# Patient Record
Sex: FEMALE | Race: WHITE | NOT HISPANIC OR LATINO | Employment: OTHER | URBAN - METROPOLITAN AREA
[De-identification: names, ages, dates, MRNs, and addresses within clinical notes are randomized per-mention and may not be internally consistent; named-entity substitution may affect disease eponyms.]

---

## 2017-01-23 ENCOUNTER — COMMUNICATION - HEALTHEAST (OUTPATIENT)
Dept: ONCOLOGY | Facility: HOSPITAL | Age: 72
End: 2017-01-23

## 2017-02-03 ENCOUNTER — COMMUNICATION - HEALTHEAST (OUTPATIENT)
Dept: ONCOLOGY | Facility: HOSPITAL | Age: 72
End: 2017-02-03

## 2017-02-03 ENCOUNTER — OFFICE VISIT - HEALTHEAST (OUTPATIENT)
Dept: RADIATION ONCOLOGY | Facility: HOSPITAL | Age: 72
End: 2017-02-03

## 2017-02-03 DIAGNOSIS — C50.111 MALIGNANT NEOPLASM OF CENTRAL PORTION OF RIGHT FEMALE BREAST (H): ICD-10-CM

## 2017-03-07 ENCOUNTER — OFFICE VISIT - HEALTHEAST (OUTPATIENT)
Dept: ONCOLOGY | Facility: HOSPITAL | Age: 72
End: 2017-03-07

## 2017-03-07 DIAGNOSIS — C50.919 BREAST CANCER (H): ICD-10-CM

## 2017-05-24 ENCOUNTER — COMMUNICATION - HEALTHEAST (OUTPATIENT)
Dept: ONCOLOGY | Facility: HOSPITAL | Age: 72
End: 2017-05-24

## 2017-06-06 ENCOUNTER — COMMUNICATION - HEALTHEAST (OUTPATIENT)
Dept: ADMINISTRATIVE | Facility: HOSPITAL | Age: 72
End: 2017-06-06

## 2017-06-08 ENCOUNTER — OFFICE VISIT - HEALTHEAST (OUTPATIENT)
Dept: ONCOLOGY | Facility: HOSPITAL | Age: 72
End: 2017-06-08

## 2017-06-08 DIAGNOSIS — C50.919 BREAST CANCER (H): ICD-10-CM

## 2017-06-22 ENCOUNTER — COMMUNICATION - HEALTHEAST (OUTPATIENT)
Dept: ONCOLOGY | Facility: CLINIC | Age: 72
End: 2017-06-22

## 2017-08-28 ENCOUNTER — COMMUNICATION - HEALTHEAST (OUTPATIENT)
Dept: ONCOLOGY | Facility: HOSPITAL | Age: 72
End: 2017-08-28

## 2017-09-11 ENCOUNTER — COMMUNICATION - HEALTHEAST (OUTPATIENT)
Dept: ADMINISTRATIVE | Facility: HOSPITAL | Age: 72
End: 2017-09-11

## 2017-09-12 ENCOUNTER — OFFICE VISIT - HEALTHEAST (OUTPATIENT)
Dept: ONCOLOGY | Facility: HOSPITAL | Age: 72
End: 2017-09-12

## 2017-09-12 DIAGNOSIS — C50.919 BREAST CANCER (H): ICD-10-CM

## 2017-10-10 ENCOUNTER — HOSPITAL ENCOUNTER (OUTPATIENT)
Dept: MAMMOGRAPHY | Facility: HOSPITAL | Age: 72
Discharge: HOME | End: 2017-10-10
Attending: SPECIALIST

## 2017-10-10 DIAGNOSIS — C50.411 MALIGNANT NEOPLASM OF UPPER-OUTER QUADRANT OF RIGHT FEMALE BREAST (H): ICD-10-CM

## 2017-11-07 ENCOUNTER — OFFICE VISIT - HEALTHEAST (OUTPATIENT)
Dept: SURGERY | Facility: CLINIC | Age: 72
End: 2017-11-07

## 2017-11-07 DIAGNOSIS — Z85.3 PERSONAL HISTORY OF BREAST CANCER: ICD-10-CM

## 2017-11-07 ASSESSMENT — MIFFLIN-ST. JEOR: SCORE: 1219.81

## 2017-12-20 ENCOUNTER — COMMUNICATION - HEALTHEAST (OUTPATIENT)
Dept: ONCOLOGY | Facility: HOSPITAL | Age: 72
End: 2017-12-20

## 2018-04-11 ENCOUNTER — COMMUNICATION - HEALTHEAST (OUTPATIENT)
Dept: ADMINISTRATIVE | Facility: HOSPITAL | Age: 73
End: 2018-04-11

## 2018-04-13 ENCOUNTER — OFFICE VISIT - HEALTHEAST (OUTPATIENT)
Dept: ONCOLOGY | Facility: HOSPITAL | Age: 73
End: 2018-04-13

## 2018-04-13 DIAGNOSIS — Z12.31 ENCOUNTER FOR SCREENING MAMMOGRAM FOR MALIGNANT NEOPLASM OF BREAST: ICD-10-CM

## 2018-04-13 DIAGNOSIS — C50.919 BREAST CANCER (H): ICD-10-CM

## 2018-10-02 ENCOUNTER — RECORDS - HEALTHEAST (OUTPATIENT)
Dept: ADMINISTRATIVE | Facility: OTHER | Age: 73
End: 2018-10-02

## 2018-10-02 ENCOUNTER — RECORDS - HEALTHEAST (OUTPATIENT)
Dept: LAB | Facility: CLINIC | Age: 73
End: 2018-10-02

## 2018-10-02 LAB
ALBUMIN SERPL-MCNC: 3.2 G/DL (ref 3.5–5)
ALP SERPL-CCNC: 72 U/L (ref 45–120)
ALT SERPL W P-5'-P-CCNC: 16 U/L (ref 0–45)
ANION GAP SERPL CALCULATED.3IONS-SCNC: 11 MMOL/L (ref 5–18)
AST SERPL W P-5'-P-CCNC: 17 U/L (ref 0–40)
BILIRUB SERPL-MCNC: 1.7 MG/DL (ref 0–1)
BUN SERPL-MCNC: 28 MG/DL (ref 8–28)
CALCIUM SERPL-MCNC: 9.4 MG/DL (ref 8.5–10.5)
CHLORIDE BLD-SCNC: 98 MMOL/L (ref 98–107)
CHOLEST SERPL-MCNC: 196 MG/DL
CO2 SERPL-SCNC: 26 MMOL/L (ref 22–31)
CREAT SERPL-MCNC: 0.87 MG/DL (ref 0.6–1.1)
FASTING STATUS PATIENT QL REPORTED: NORMAL
GFR SERPL CREATININE-BSD FRML MDRD: >60 ML/MIN/1.73M2
GLUCOSE BLD-MCNC: 120 MG/DL (ref 70–125)
HDLC SERPL-MCNC: 72 MG/DL
LDLC SERPL CALC-MCNC: 101 MG/DL
POTASSIUM BLD-SCNC: 3.8 MMOL/L (ref 3.5–5)
PROT SERPL-MCNC: 6.1 G/DL (ref 6–8)
SODIUM SERPL-SCNC: 135 MMOL/L (ref 136–145)
TRIGL SERPL-MCNC: 113 MG/DL
TSH SERPL DL<=0.005 MIU/L-ACNC: 0.88 UIU/ML (ref 0.3–5)

## 2018-10-04 LAB — BACTERIA SPEC CULT: NORMAL

## 2018-10-11 ENCOUNTER — COMMUNICATION - HEALTHEAST (OUTPATIENT)
Dept: ONCOLOGY | Facility: HOSPITAL | Age: 73
End: 2018-10-11

## 2018-10-11 ENCOUNTER — COMMUNICATION - HEALTHEAST (OUTPATIENT)
Dept: ADMINISTRATIVE | Facility: HOSPITAL | Age: 73
End: 2018-10-11

## 2018-10-11 DIAGNOSIS — C50.919 BREAST CANCER (H): ICD-10-CM

## 2018-10-13 ENCOUNTER — HOME CARE/HOSPICE - HEALTHEAST (OUTPATIENT)
Dept: HOME HEALTH SERVICES | Facility: HOME HEALTH | Age: 73
End: 2018-10-13

## 2018-10-15 ENCOUNTER — HOME CARE/HOSPICE - HEALTHEAST (OUTPATIENT)
Dept: HOME HEALTH SERVICES | Facility: HOME HEALTH | Age: 73
End: 2018-10-15

## 2018-10-15 ENCOUNTER — RECORDS - HEALTHEAST (OUTPATIENT)
Dept: ADMINISTRATIVE | Facility: OTHER | Age: 73
End: 2018-10-15

## 2018-10-16 ENCOUNTER — RECORDS - HEALTHEAST (OUTPATIENT)
Dept: MAMMOGRAPHY | Facility: CLINIC | Age: 73
End: 2018-10-16

## 2018-10-16 ENCOUNTER — OFFICE VISIT - HEALTHEAST (OUTPATIENT)
Dept: ONCOLOGY | Facility: HOSPITAL | Age: 73
End: 2018-10-16

## 2018-10-16 DIAGNOSIS — C50.919 MALIGNANT NEOPLASM OF UNSPECIFIED SITE OF UNSPECIFIED FEMALE BREAST (H): ICD-10-CM

## 2018-10-16 DIAGNOSIS — Z12.31 ENCOUNTER FOR SCREENING MAMMOGRAM FOR MALIGNANT NEOPLASM OF BREAST: ICD-10-CM

## 2018-10-16 DIAGNOSIS — C50.919 MALIGNANT NEOPLASM OF FEMALE BREAST, UNSPECIFIED ESTROGEN RECEPTOR STATUS, UNSPECIFIED LATERALITY, UNSPECIFIED SITE OF BREAST (H): ICD-10-CM

## 2018-10-18 ENCOUNTER — HOSPITAL ENCOUNTER (OUTPATIENT)
Dept: CT IMAGING | Facility: HOSPITAL | Age: 73
Discharge: HOME | End: 2018-10-18
Attending: FAMILY MEDICINE | Admitting: RADIOLOGY

## 2018-10-18 ENCOUNTER — HOSPITAL ENCOUNTER (OUTPATIENT)
Dept: INTERVENTIONAL RADIOLOGY/VASCULAR | Facility: HOSPITAL | Age: 73
Discharge: HOME | End: 2018-10-18
Attending: FAMILY MEDICINE

## 2018-10-18 DIAGNOSIS — K57.32 DIVERTICULITIS OF COLON: ICD-10-CM

## 2018-10-18 ASSESSMENT — MIFFLIN-ST. JEOR: SCORE: 1200.76

## 2018-10-19 ENCOUNTER — HOME CARE/HOSPICE - HEALTHEAST (OUTPATIENT)
Dept: HOME HEALTH SERVICES | Facility: HOME HEALTH | Age: 73
End: 2018-10-19

## 2018-10-19 ENCOUNTER — RECORDS - HEALTHEAST (OUTPATIENT)
Dept: LAB | Facility: CLINIC | Age: 73
End: 2018-10-19

## 2018-10-19 LAB
ALBUMIN SERPL-MCNC: 3.2 G/DL (ref 3.5–5)
ALP SERPL-CCNC: 82 U/L (ref 45–120)
ALT SERPL W P-5'-P-CCNC: 29 U/L (ref 0–45)
ANION GAP SERPL CALCULATED.3IONS-SCNC: 14 MMOL/L (ref 5–18)
AST SERPL W P-5'-P-CCNC: 23 U/L (ref 0–40)
BILIRUB SERPL-MCNC: 0.6 MG/DL (ref 0–1)
BUN SERPL-MCNC: 14 MG/DL (ref 8–28)
CALCIUM SERPL-MCNC: 9.4 MG/DL (ref 8.5–10.5)
CHLORIDE BLD-SCNC: 103 MMOL/L (ref 98–107)
CO2 SERPL-SCNC: 24 MMOL/L (ref 22–31)
CREAT SERPL-MCNC: 0.73 MG/DL (ref 0.6–1.1)
GFR SERPL CREATININE-BSD FRML MDRD: >60 ML/MIN/1.73M2
GLUCOSE BLD-MCNC: 91 MG/DL (ref 70–125)
POTASSIUM BLD-SCNC: 4.5 MMOL/L (ref 3.5–5)
PROT SERPL-MCNC: 6.5 G/DL (ref 6–8)
SODIUM SERPL-SCNC: 141 MMOL/L (ref 136–145)

## 2018-10-22 ENCOUNTER — OFFICE VISIT - HEALTHEAST (OUTPATIENT)
Dept: SURGERY | Facility: CLINIC | Age: 73
End: 2018-10-22

## 2018-10-22 ENCOUNTER — HOME CARE/HOSPICE - HEALTHEAST (OUTPATIENT)
Dept: HOME HEALTH SERVICES | Facility: HOME HEALTH | Age: 73
End: 2018-10-22

## 2018-10-22 DIAGNOSIS — K57.30 DIVERTICULA OF COLON: ICD-10-CM

## 2018-10-22 ASSESSMENT — MIFFLIN-ST. JEOR: SCORE: 1182.61

## 2018-10-25 ENCOUNTER — HOSPITAL ENCOUNTER (OUTPATIENT)
Dept: INTERVENTIONAL RADIOLOGY/VASCULAR | Facility: HOSPITAL | Age: 73
Discharge: HOME | End: 2018-10-25
Admitting: RADIOLOGY

## 2018-10-25 DIAGNOSIS — L02.91 ABSCESS: ICD-10-CM

## 2018-10-25 ASSESSMENT — MIFFLIN-ST. JEOR: SCORE: 1182.61

## 2018-10-26 ENCOUNTER — HOME CARE/HOSPICE - HEALTHEAST (OUTPATIENT)
Dept: HOME HEALTH SERVICES | Facility: HOME HEALTH | Age: 73
End: 2018-10-26

## 2018-10-29 ENCOUNTER — OFFICE VISIT - HEALTHEAST (OUTPATIENT)
Dept: INFECTIOUS DISEASES | Facility: CLINIC | Age: 73
End: 2018-10-29

## 2018-10-29 ENCOUNTER — OFFICE VISIT - HEALTHEAST (OUTPATIENT)
Dept: SURGERY | Facility: CLINIC | Age: 73
End: 2018-10-29

## 2018-10-29 DIAGNOSIS — N73.9 PELVIC ABSCESS IN FEMALE: ICD-10-CM

## 2018-10-29 DIAGNOSIS — K57.32 DIVERTICULITIS OF COLON: ICD-10-CM

## 2018-10-29 DIAGNOSIS — K57.30 DIVERTICULA OF COLON: ICD-10-CM

## 2018-10-29 ASSESSMENT — MIFFLIN-ST. JEOR
SCORE: 1187.15
SCORE: 1192.14

## 2018-11-01 ENCOUNTER — HOSPITAL ENCOUNTER (OUTPATIENT)
Dept: INTERVENTIONAL RADIOLOGY/VASCULAR | Facility: HOSPITAL | Age: 73
Discharge: HOME | End: 2018-11-01
Attending: NURSE PRACTITIONER | Admitting: RADIOLOGY

## 2018-11-01 DIAGNOSIS — L98.8 FISTULA: ICD-10-CM

## 2018-11-01 ASSESSMENT — MIFFLIN-ST. JEOR: SCORE: 1187.15

## 2018-11-02 ENCOUNTER — HOME CARE/HOSPICE - HEALTHEAST (OUTPATIENT)
Dept: HOME HEALTH SERVICES | Facility: HOME HEALTH | Age: 73
End: 2018-11-02

## 2018-11-02 ENCOUNTER — COMMUNICATION - HEALTHEAST (OUTPATIENT)
Dept: INTERVENTIONAL RADIOLOGY/VASCULAR | Facility: HOSPITAL | Age: 73
End: 2018-11-02

## 2019-01-08 ENCOUNTER — AMBULATORY - HEALTHEAST (OUTPATIENT)
Dept: ONCOLOGY | Facility: HOSPITAL | Age: 74
End: 2019-01-08

## 2019-01-08 DIAGNOSIS — C50.919 BREAST CANCER (H): ICD-10-CM

## 2019-01-08 DIAGNOSIS — C50.919 MALIGNANT NEOPLASM OF FEMALE BREAST, UNSPECIFIED ESTROGEN RECEPTOR STATUS, UNSPECIFIED LATERALITY, UNSPECIFIED SITE OF BREAST (H): ICD-10-CM

## 2019-01-14 ENCOUNTER — COMMUNICATION - HEALTHEAST (OUTPATIENT)
Dept: SURGERY | Facility: CLINIC | Age: 74
End: 2019-01-14

## 2019-01-28 ASSESSMENT — MIFFLIN-ST. JEOR
SCORE: 1187.15
SCORE: 1187.15

## 2019-01-30 ENCOUNTER — SURGERY - HEALTHEAST (OUTPATIENT)
Dept: SURGERY | Facility: AMBULATORY SURGERY CENTER | Age: 74
End: 2019-01-30

## 2019-01-30 ENCOUNTER — ANESTHESIA - HEALTHEAST (OUTPATIENT)
Dept: SURGERY | Facility: AMBULATORY SURGERY CENTER | Age: 74
End: 2019-01-30

## 2019-01-30 ENCOUNTER — HOSPITAL ENCOUNTER (OUTPATIENT)
Dept: SURGERY | Facility: AMBULATORY SURGERY CENTER | Age: 74
Discharge: HOME | End: 2019-01-30
Attending: SURGERY | Admitting: SURGERY

## 2019-01-30 ASSESSMENT — MIFFLIN-ST. JEOR
SCORE: 1187.15
SCORE: 1187.15

## 2019-01-31 ENCOUNTER — OFFICE VISIT - HEALTHEAST (OUTPATIENT)
Dept: SURGERY | Facility: CLINIC | Age: 74
End: 2019-01-31

## 2019-01-31 DIAGNOSIS — K57.30 DIVERTICULA OF COLON: ICD-10-CM

## 2019-01-31 ASSESSMENT — MIFFLIN-ST. JEOR: SCORE: 1178.08

## 2019-02-06 ENCOUNTER — RECORDS - HEALTHEAST (OUTPATIENT)
Dept: ADMINISTRATIVE | Facility: OTHER | Age: 74
End: 2019-02-06

## 2019-02-06 ENCOUNTER — RECORDS - HEALTHEAST (OUTPATIENT)
Dept: LAB | Facility: CLINIC | Age: 74
End: 2019-02-06

## 2019-02-06 LAB
ALBUMIN SERPL-MCNC: 4 G/DL (ref 3.5–5)
ALP SERPL-CCNC: 84 U/L (ref 45–120)
ALT SERPL W P-5'-P-CCNC: 29 U/L (ref 0–45)
ANION GAP SERPL CALCULATED.3IONS-SCNC: 12 MMOL/L (ref 5–18)
AST SERPL W P-5'-P-CCNC: 31 U/L (ref 0–40)
BILIRUB SERPL-MCNC: 0.6 MG/DL (ref 0–1)
BUN SERPL-MCNC: 23 MG/DL (ref 8–28)
CALCIUM SERPL-MCNC: 9.7 MG/DL (ref 8.5–10.5)
CHLORIDE BLD-SCNC: 105 MMOL/L (ref 98–107)
CO2 SERPL-SCNC: 24 MMOL/L (ref 22–31)
CREAT SERPL-MCNC: 0.77 MG/DL (ref 0.6–1.1)
GFR SERPL CREATININE-BSD FRML MDRD: >60 ML/MIN/1.73M2
GLUCOSE BLD-MCNC: 78 MG/DL (ref 70–125)
POTASSIUM BLD-SCNC: 3.8 MMOL/L (ref 3.5–5)
PROT SERPL-MCNC: 6.8 G/DL (ref 6–8)
SODIUM SERPL-SCNC: 141 MMOL/L (ref 136–145)

## 2019-02-09 ENCOUNTER — ANESTHESIA - HEALTHEAST (OUTPATIENT)
Dept: SURGERY | Facility: HOSPITAL | Age: 74
End: 2019-02-09

## 2019-02-11 ENCOUNTER — SURGERY - HEALTHEAST (OUTPATIENT)
Dept: SURGERY | Facility: HOSPITAL | Age: 74
End: 2019-02-11

## 2019-02-11 ASSESSMENT — MIFFLIN-ST. JEOR
SCORE: 1178.08
SCORE: 1155.4
SCORE: 1155.85

## 2019-02-28 ENCOUNTER — OFFICE VISIT - HEALTHEAST (OUTPATIENT)
Dept: SURGERY | Facility: CLINIC | Age: 74
End: 2019-02-28

## 2019-02-28 DIAGNOSIS — Z48.89 POSTOPERATIVE VISIT: ICD-10-CM

## 2019-02-28 ASSESSMENT — MIFFLIN-ST. JEOR: SCORE: 1146.32

## 2019-04-10 ENCOUNTER — RECORDS - HEALTHEAST (OUTPATIENT)
Dept: LAB | Facility: CLINIC | Age: 74
End: 2019-04-10

## 2019-04-10 LAB
ERYTHROCYTE [DISTWIDTH] IN BLOOD BY AUTOMATED COUNT: 13.5 % (ref 11–14.5)
HCT VFR BLD AUTO: 47 % (ref 35–47)
HGB BLD-MCNC: 15.1 G/DL (ref 12–16)
MCH RBC QN AUTO: 30.3 PG (ref 27–34)
MCHC RBC AUTO-ENTMCNC: 32.1 G/DL (ref 32–36)
MCV RBC AUTO: 94 FL (ref 80–100)
PLATELET # BLD AUTO: 189 THOU/UL (ref 140–440)
PMV BLD AUTO: 9.9 FL (ref 8.5–12.5)
RBC # BLD AUTO: 4.98 MILL/UL (ref 3.8–5.4)
WBC: 4.6 THOU/UL (ref 4–11)

## 2019-04-11 ENCOUNTER — RECORDS - HEALTHEAST (OUTPATIENT)
Dept: LAB | Facility: CLINIC | Age: 74
End: 2019-04-11

## 2019-04-11 LAB
ALBUMIN SERPL-MCNC: 4.1 G/DL (ref 3.5–5)
ALP SERPL-CCNC: 93 U/L (ref 45–120)
ALT SERPL W P-5'-P-CCNC: 23 U/L (ref 0–45)
ANION GAP SERPL CALCULATED.3IONS-SCNC: 10 MMOL/L (ref 5–18)
AST SERPL W P-5'-P-CCNC: 26 U/L (ref 0–40)
BILIRUB SERPL-MCNC: 0.5 MG/DL (ref 0–1)
BUN SERPL-MCNC: 22 MG/DL (ref 8–28)
CALCIUM SERPL-MCNC: 10 MG/DL (ref 8.5–10.5)
CHLORIDE BLD-SCNC: 104 MMOL/L (ref 98–107)
CO2 SERPL-SCNC: 28 MMOL/L (ref 22–31)
CREAT SERPL-MCNC: 0.73 MG/DL (ref 0.6–1.1)
GFR SERPL CREATININE-BSD FRML MDRD: >60 ML/MIN/1.73M2
GLUCOSE BLD-MCNC: 87 MG/DL (ref 70–125)
POTASSIUM BLD-SCNC: 4.3 MMOL/L (ref 3.5–5)
PROT SERPL-MCNC: 6.6 G/DL (ref 6–8)
SODIUM SERPL-SCNC: 142 MMOL/L (ref 136–145)

## 2019-04-16 ENCOUNTER — OFFICE VISIT - HEALTHEAST (OUTPATIENT)
Dept: ONCOLOGY | Facility: HOSPITAL | Age: 74
End: 2019-04-16

## 2019-04-16 DIAGNOSIS — Z79.811 AROMATASE INHIBITOR USE: ICD-10-CM

## 2019-04-16 DIAGNOSIS — Z12.31 ENCOUNTER FOR SCREENING MAMMOGRAM FOR MALIGNANT NEOPLASM OF BREAST: ICD-10-CM

## 2019-04-16 DIAGNOSIS — C50.919 MALIGNANT NEOPLASM OF FEMALE BREAST, UNSPECIFIED ESTROGEN RECEPTOR STATUS, UNSPECIFIED LATERALITY, UNSPECIFIED SITE OF BREAST (H): ICD-10-CM

## 2019-04-23 ASSESSMENT — MIFFLIN-ST. JEOR: SCORE: 1149.73

## 2019-04-25 ENCOUNTER — ANESTHESIA - HEALTHEAST (OUTPATIENT)
Dept: SURGERY | Facility: HOSPITAL | Age: 74
End: 2019-04-25

## 2019-04-25 ENCOUNTER — SURGERY - HEALTHEAST (OUTPATIENT)
Dept: SURGERY | Facility: HOSPITAL | Age: 74
End: 2019-04-25

## 2019-04-25 ASSESSMENT — MIFFLIN-ST. JEOR: SCORE: 1171.05

## 2019-10-17 ENCOUNTER — HOSPITAL ENCOUNTER (OUTPATIENT)
Dept: MAMMOGRAPHY | Facility: CLINIC | Age: 74
Discharge: HOME | End: 2019-10-17
Attending: INTERNAL MEDICINE

## 2019-10-17 DIAGNOSIS — C50.919 MALIGNANT NEOPLASM OF FEMALE BREAST, UNSPECIFIED ESTROGEN RECEPTOR STATUS, UNSPECIFIED LATERALITY, UNSPECIFIED SITE OF BREAST (H): ICD-10-CM

## 2019-10-17 DIAGNOSIS — Z12.31 ENCOUNTER FOR SCREENING MAMMOGRAM FOR MALIGNANT NEOPLASM OF BREAST: ICD-10-CM

## 2019-10-22 ENCOUNTER — OFFICE VISIT - HEALTHEAST (OUTPATIENT)
Dept: ONCOLOGY | Facility: HOSPITAL | Age: 74
End: 2019-10-22

## 2019-10-22 DIAGNOSIS — C50.919 MALIGNANT NEOPLASM OF FEMALE BREAST, UNSPECIFIED ESTROGEN RECEPTOR STATUS, UNSPECIFIED LATERALITY, UNSPECIFIED SITE OF BREAST (H): ICD-10-CM

## 2019-10-22 ASSESSMENT — MIFFLIN-ST. JEOR: SCORE: 1139.07

## 2019-11-01 ENCOUNTER — COMMUNICATION - HEALTHEAST (OUTPATIENT)
Dept: ONCOLOGY | Facility: HOSPITAL | Age: 74
End: 2019-11-01

## 2019-11-01 DIAGNOSIS — C50.919 BREAST CANCER (H): ICD-10-CM

## 2019-11-01 DIAGNOSIS — C50.919 MALIGNANT NEOPLASM OF FEMALE BREAST, UNSPECIFIED ESTROGEN RECEPTOR STATUS, UNSPECIFIED LATERALITY, UNSPECIFIED SITE OF BREAST (H): ICD-10-CM

## 2019-11-12 ENCOUNTER — COMMUNICATION - HEALTHEAST (OUTPATIENT)
Dept: ONCOLOGY | Facility: HOSPITAL | Age: 74
End: 2019-11-12

## 2020-04-15 ENCOUNTER — COMMUNICATION - HEALTHEAST (OUTPATIENT)
Dept: ONCOLOGY | Facility: HOSPITAL | Age: 75
End: 2020-04-15

## 2020-04-15 DIAGNOSIS — C50.919 BREAST CANCER (H): ICD-10-CM

## 2020-04-15 DIAGNOSIS — C50.919 MALIGNANT NEOPLASM OF FEMALE BREAST, UNSPECIFIED ESTROGEN RECEPTOR STATUS, UNSPECIFIED LATERALITY, UNSPECIFIED SITE OF BREAST (H): ICD-10-CM

## 2020-04-16 ENCOUNTER — COMMUNICATION - HEALTHEAST (OUTPATIENT)
Dept: ONCOLOGY | Facility: HOSPITAL | Age: 75
End: 2020-04-16

## 2020-04-16 DIAGNOSIS — C50.919 BREAST CANCER (H): ICD-10-CM

## 2020-04-16 DIAGNOSIS — C50.919 MALIGNANT NEOPLASM OF FEMALE BREAST, UNSPECIFIED ESTROGEN RECEPTOR STATUS, UNSPECIFIED LATERALITY, UNSPECIFIED SITE OF BREAST (H): ICD-10-CM

## 2020-04-23 ENCOUNTER — OFFICE VISIT - HEALTHEAST (OUTPATIENT)
Dept: ONCOLOGY | Facility: HOSPITAL | Age: 75
End: 2020-04-23

## 2020-04-23 DIAGNOSIS — Z79.811 AROMATASE INHIBITOR USE: ICD-10-CM

## 2020-04-23 DIAGNOSIS — M85.89 OSTEOPENIA OF MULTIPLE SITES: ICD-10-CM

## 2020-04-23 DIAGNOSIS — C50.919 MALIGNANT NEOPLASM OF FEMALE BREAST, UNSPECIFIED ESTROGEN RECEPTOR STATUS, UNSPECIFIED LATERALITY, UNSPECIFIED SITE OF BREAST (H): ICD-10-CM

## 2020-05-07 ENCOUNTER — COMMUNICATION - HEALTHEAST (OUTPATIENT)
Dept: ONCOLOGY | Facility: HOSPITAL | Age: 75
End: 2020-05-07

## 2020-06-30 ENCOUNTER — COMMUNICATION - HEALTHEAST (OUTPATIENT)
Dept: ONCOLOGY | Facility: HOSPITAL | Age: 75
End: 2020-06-30

## 2020-06-30 DIAGNOSIS — C50.919 BREAST CANCER (H): ICD-10-CM

## 2020-06-30 DIAGNOSIS — C50.919 MALIGNANT NEOPLASM OF FEMALE BREAST, UNSPECIFIED ESTROGEN RECEPTOR STATUS, UNSPECIFIED LATERALITY, UNSPECIFIED SITE OF BREAST (H): ICD-10-CM

## 2020-10-19 ENCOUNTER — HOSPITAL ENCOUNTER (OUTPATIENT)
Dept: MAMMOGRAPHY | Facility: CLINIC | Age: 75
Discharge: HOME | End: 2020-10-19

## 2020-10-19 DIAGNOSIS — C50.919 MALIGNANT NEOPLASM OF FEMALE BREAST, UNSPECIFIED ESTROGEN RECEPTOR STATUS, UNSPECIFIED LATERALITY, UNSPECIFIED SITE OF BREAST (H): ICD-10-CM

## 2020-10-19 DIAGNOSIS — Z12.31 VISIT FOR SCREENING MAMMOGRAM: ICD-10-CM

## 2020-10-21 ENCOUNTER — AMBULATORY - HEALTHEAST (OUTPATIENT)
Dept: ONCOLOGY | Facility: HOSPITAL | Age: 75
End: 2020-10-21

## 2020-10-21 DIAGNOSIS — C50.919 MALIGNANT NEOPLASM OF FEMALE BREAST, UNSPECIFIED ESTROGEN RECEPTOR STATUS, UNSPECIFIED LATERALITY, UNSPECIFIED SITE OF BREAST (H): ICD-10-CM

## 2020-10-22 ENCOUNTER — OFFICE VISIT - HEALTHEAST (OUTPATIENT)
Dept: ONCOLOGY | Facility: HOSPITAL | Age: 75
End: 2020-10-22

## 2020-10-22 ENCOUNTER — AMBULATORY - HEALTHEAST (OUTPATIENT)
Dept: INFUSION THERAPY | Facility: HOSPITAL | Age: 75
End: 2020-10-22

## 2020-10-22 DIAGNOSIS — C50.919 MALIGNANT NEOPLASM OF FEMALE BREAST, UNSPECIFIED ESTROGEN RECEPTOR STATUS, UNSPECIFIED LATERALITY, UNSPECIFIED SITE OF BREAST (H): ICD-10-CM

## 2020-10-22 DIAGNOSIS — C50.919 BREAST CANCER (H): ICD-10-CM

## 2020-10-22 LAB
ALBUMIN SERPL-MCNC: 4.1 G/DL (ref 3.5–5)
ALP SERPL-CCNC: 78 U/L (ref 45–120)
ALT SERPL W P-5'-P-CCNC: 27 U/L (ref 0–45)
ANION GAP SERPL CALCULATED.3IONS-SCNC: 9 MMOL/L (ref 5–18)
AST SERPL W P-5'-P-CCNC: 27 U/L (ref 0–40)
BILIRUB SERPL-MCNC: 0.7 MG/DL (ref 0–1)
BUN SERPL-MCNC: 20 MG/DL (ref 8–28)
CALCIUM SERPL-MCNC: 9.4 MG/DL (ref 8.5–10.5)
CHLORIDE BLD-SCNC: 104 MMOL/L (ref 98–107)
CO2 SERPL-SCNC: 30 MMOL/L (ref 22–31)
CREAT SERPL-MCNC: 0.83 MG/DL (ref 0.6–1.1)
GFR SERPL CREATININE-BSD FRML MDRD: >60 ML/MIN/1.73M2
GLUCOSE BLD-MCNC: 100 MG/DL (ref 70–125)
POTASSIUM BLD-SCNC: 4.1 MMOL/L (ref 3.5–5)
PROT SERPL-MCNC: 7 G/DL (ref 6–8)
SODIUM SERPL-SCNC: 143 MMOL/L (ref 136–145)

## 2020-10-22 RX ORDER — ANASTROZOLE 1 MG/1
TABLET ORAL
Qty: 90 TABLET | Refills: 3 | Status: SHIPPED | OUTPATIENT
Start: 2020-10-22 | End: 2021-11-01

## 2020-11-16 ENCOUNTER — COMMUNICATION - HEALTHEAST (OUTPATIENT)
Dept: ONCOLOGY | Facility: HOSPITAL | Age: 75
End: 2020-11-16

## 2021-03-23 ENCOUNTER — COMMUNICATION - HEALTHEAST (OUTPATIENT)
Dept: ADMINISTRATIVE | Facility: CLINIC | Age: 76
End: 2021-03-23

## 2021-05-03 ENCOUNTER — OFFICE VISIT - HEALTHEAST (OUTPATIENT)
Dept: ONCOLOGY | Facility: HOSPITAL | Age: 76
End: 2021-05-03

## 2021-05-03 DIAGNOSIS — Z12.31 ENCOUNTER FOR SCREENING MAMMOGRAM FOR MALIGNANT NEOPLASM OF BREAST: ICD-10-CM

## 2021-05-03 DIAGNOSIS — C50.919 MALIGNANT NEOPLASM OF FEMALE BREAST, UNSPECIFIED ESTROGEN RECEPTOR STATUS, UNSPECIFIED LATERALITY, UNSPECIFIED SITE OF BREAST (H): ICD-10-CM

## 2021-05-03 ASSESSMENT — MIFFLIN-ST. JEOR: SCORE: 1204.84

## 2021-05-25 ENCOUNTER — RECORDS - HEALTHEAST (OUTPATIENT)
Dept: ADMINISTRATIVE | Facility: CLINIC | Age: 76
End: 2021-05-25

## 2021-05-27 NOTE — PROGRESS NOTES
[facility] Hematology and Oncology Progress Note    Patient: Norma Paul  MRN: [id number]  Date of Service:         Reason for Visit    Chief Complaint   Patient presents with     HE Cancer     Breast cancer       Assessment and Plan    T1 cN0 M0 stage I right breast cancer status post lumpectomy and sentinel lymph node evaluation on October 27 2016, tumor is ER/LA positive and HER-2/ok negative, Oncotype DX recurrence score of 12    Patient doing well with no evidence of recurrence of breast cancer.  She is 2-1/2 years out from diagnosis.  She will continue anastrozole daily.  Can hold it for a couple of days around her upcoming surgery for vaginal and bladder prolapse.  We will see her back in 6 months for follow-up.  We will schedule DEXA scan and mammogram just before she returns.    Plan: As above    Measurable disease: None postoperatively    Current therapy: Anastrozole 1 mg by mouth daily started generally first 2017    Treatment history: Left lumpectomy and then adjuvant radiation therapy, 16 fractions completed December 2016      Breast cancer    Staging form: Breast, AJCC 7th Edition      Clinical stage from 11/8/2016: Stage IA (T1c, N0, M0) - Unsigned    ECOG Performance   ECOG Performance Status: 1    Distress Assessment  Distress Assessment Score: No distress    Pain         Problem List    1. Malignant neoplasm of female breast, unspecified estrogen receptor status, unspecified laterality, unspecified site of breast (H)  Mammo Screening Bilateral   2. Aromatase inhibitor use  DXA Bone Density Scan    DXA Bone Density Scan   3. Encounter for screening mammogram for malignant neoplasm of breast   Mammo Screening Bilateral        CC: Mark Astudillo MD    ______________________________________________________________________________    History of Present Illness    Ms. Norma Paul returns for reevaluation.  She was seen 6 months ago.  She has had issues with abdominal abscess related to  diverticulitis and eventually underwent partial colectomy.  She is scheduled for surgery next week for uterine and bladder prolapse.  Continues anastrozole without problems.  No headaches or dizziness.  No shortness of breath or cough.  No breast changes.  No new bone or abdominal pain.  ECOG status is 0.    Pain Status  Currently in Pain: No/denies    Review of Systems    Constitutional  Constitutional (WDL): All constitutional elements are within defined limits  Neurosensory  Neurosensory (WDL): All neurosensory elements are within defined limits  Cardiovascular  Cardiovascular (WDL): All cardiovascular elements are within defined limits  Pulmonary  Respiratory (WDL): Within Defined Limits  Gastrointestinal  Gastrointestinal (WDL): Exceptions to WDL  Anorexia: Loss of appetite without alteration in eating habits(So, so.)  Constipation: Occasional or intermittent symptoms, occasional use of stool softeners, laxatives, dietary modification, or enema  Genitourinary  Genitourinary (WDL): Exceptions to WDL(Prolapsed uterus and vagina. )  Integumentary  Integumentary (WDL): All integumentary elements are within defined limits  Patient Coping  Patient Coping: Accepting  Distress Assessment  Distress Assessment Score: No distress  Accompanied by  Accompanied by: Family Member    Past History  Past Medical History:   Diagnosis Date     Breast cancer (H) 2016     Hx of radiation therapy      Hyperlipidemia      Indwelling catheter present on admission      Prolapse, uterovaginal          Past Surgical History:   Procedure Laterality Date     ABDOMINAL SURGERY       BREAST BIOPSY Right 2016     BREAST LUMPECTOMY Right 10/27/2016    DR. PEARSON      SECTION       COLONOSCOPY N/A 2019    Procedure: COLONOSCOPY;  Surgeon: Shukri Matute MD;  Location: [facility];  Service: General     HYSTERECTOMY       WI LAP,SURG,COLECTOMY, PARTIAL, W/ANAST N/A 2019    Procedure: COLECTOMY, SIGMOID, LAPAROSCOPIC;   Surgeon: Shukri Matute MD;  Location: [facility];  Service: General     VITRECTOMY Left 05/11/2009    retinal reattachment       Physical Exam    Recent Vitals 4/16/2019   Height -   Weight 146 lbs 8 oz   BSA (m2) 1.73 m2   /78   Pulse 74   Temp 98.2   Temp src 1   SpO2 99   Some recent data might be hidden       GENERAL: Alert and oriented. Seated comfortably. In no distress.    HEAD: Atraumatic and normocephalic.  Has a full head of hair.    EYES: GALA, EOMI.  No pallor.  No icterus.    Oral cavity: no mucosal lesion or tonsillar enlargement.    NECK: supple. JVP normal.  No thyroid enlargement.    LYMPH NODES: No palpable, cervical, axillary or inguinal lymphadenopathy.    CHEST: clear to auscultation bilaterally.  Resonant to percussion throughout bilaterally.  Symmetrical breath movements bilaterally.    CVS: S1 and S2 are heard. Regular rate and rhythm.  No murmur or gallop or rub heard.    Breasts: Right breast with well-healed lumpectomy and x-ray scars.  No masses, no nipple inversion, no axillary adenopathy.  Left breast is benign      ABDOMEN: Soft. Not tender. Not distended.  No palpable hepatomegaly or splenomegaly.  No other mass palpable.  Bowel sounds heard.    EXTREMITIES: Warm.  No peripheral edema.    SKIN: no rash, or bruising or purpura.    CNS: Nonfocal        Lab Results    Recent Results (from the past 168 hour(s))   HM2(CBC w/o Differential)   Result Value Ref Range    WBC 4.6 4.0 - 11.0 thou/uL    RBC 4.98 3.80 - 5.40 mill/uL    Hemoglobin 15.1 12.0 - 16.0 g/dL    Hematocrit 47.0 35.0 - 47.0 %    MCV 94 80 - 100 fL    MCH 30.3 27.0 - 34.0 pg    MCHC 32.1 32.0 - 36.0 g/dL    RDW 13.5 11.0 - 14.5 %    Platelets 189 140 - 440 thou/uL    MPV 9.9 8.5 - 12.5 fL   Comprehensive Metabolic Panel   Result Value Ref Range    Sodium 142 136 - 145 mmol/L    Potassium 4.3 3.5 - 5.0 mmol/L    Chloride 104 98 - 107 mmol/L    CO2 28 22 - 31 mmol/L    Anion Gap, Calculation 10 5 - 18 mmol/L     Glucose 87 70 - 125 mg/dL    BUN 22 8 - 28 mg/dL    Creatinine 0.73 0.60 - 1.10 mg/dL    GFR MDRD Af Amer >60 >60 mL/min/1.73m2    GFR MDRD Non Af Amer >60 >60 mL/min/1.73m2    Bilirubin, Total 0.5 0.0 - 1.0 mg/dL    Calcium 10.0 8.5 - 10.5 mg/dL    Protein, Total 6.6 6.0 - 8.0 g/dL    Albumin 4.1 3.5 - 5.0 g/dL    Alkaline Phosphatase 93 45 - 120 U/L    AST 26 0 - 40 U/L    ALT 23 0 - 45 U/L       Imaging    No results found.      Signed by: Tera Richey MD

## 2021-05-28 ENCOUNTER — RECORDS - HEALTHEAST (OUTPATIENT)
Dept: ADMINISTRATIVE | Facility: CLINIC | Age: 76
End: 2021-05-28

## 2021-05-28 NOTE — ANESTHESIA POSTPROCEDURE EVALUATION
Patient: Norma Paul  CYSTOSCOPY, TRANSVAGINAL APICAL/ANTERIOR PROLAPSE REPAIR WITH SACROSPINOUS LIGAMENT FIXATION AND XENFORM GRAFT  Anesthesia type: general    Patient location: PACU  Last vitals:   Vitals Value Taken Time   /60 4/26/2019 11:05 AM   Temp 36.4  C (97.5  F) 4/26/2019 11:05 AM   Pulse 68 4/26/2019 11:05 AM   Resp 16 4/26/2019 11:05 AM   SpO2 100 % 4/26/2019 11:05 AM     Post vital signs: stable  Level of consciousness: awake and responds to simple questions  Post-anesthesia pain: pain controlled  Post-anesthesia nausea and vomiting: no  Pulmonary: unassisted, return to baseline  Cardiovascular: stable and blood pressure at baseline  Hydration: adequate  Anesthetic events: no    QCDR Measures:  ASA# 11 - Niesha-op Cardiac Arrest: ASA11B - Patient did NOT experience unanticipated cardiac arrest  ASA# 12 - Niesha-op Mortality Rate: ASA12B - Patient did NOT die  ASA# 13 - PACU Re-Intubation Rate: ASA13B - Patient did NOT require a new airway mgmt  ASA# 10 - Composite Anes Safety: ASA10A - No serious adverse event    Additional Notes:

## 2021-05-28 NOTE — ANESTHESIA PREPROCEDURE EVALUATION
Anesthesia Evaluation      Patient summary reviewed   No history of anesthetic complications     Airway   Mallampati: II  Neck ROM: full   Pulmonary - negative ROS    breath sounds clear to auscultation  (-) rhonchi, wheezes, rales, stridor                         Cardiovascular   Exercise tolerance: > or = 4 METS  (+) , hypercholesterolemia,     (-) murmur  ECG reviewed (2/19/19: NSR, 75 bpm)  Rhythm: regular  Rate: normal,    no murmur      Neuro/Psych - negative ROS     Endo/Other - negative ROS      GI/Hepatic/Renal    (+)   chronic renal disease (Nephrolithiasis),      Other findings: Labs 4/10/19:  Hgb 15.1, Plt 189  Na 142, K 4.3, BUN 22, Cr 0.73      Dental - normal exam                        Anesthesia Plan  Planned anesthetic: general endotracheal  GETA.  Ketamine 50 mg for opioid sparing.  Decadron and zofran for PONV ppx.  ASA 2   Induction: intravenous   Anesthetic plan and risks discussed with: patient  Anesthesia plan special considerations: antiemetics,   Post-op plan: routine recovery

## 2021-05-28 NOTE — ANESTHESIA CARE TRANSFER NOTE
Last vitals:   Vitals:    04/25/19 1441   BP: 121/60   Pulse: 66   Resp: 14   Temp: 37.1  C (98.7  F)   SpO2: 100%     Patient's level of consciousness is drowsy  Spontaneous respirations: yes  Maintains airway independently: yes  Dentition unchanged: yes  Oropharynx: oropharynx clear of all foreign objects    QCDR Measures:  ASA# 20 - Surgical Safety Checklist: WHO surgical safety checklist completed prior to induction    PQRS# 430 - Adult PONV Prevention: 4558F - Pt received => 2 anti-emetic agents (different classes) preop & intraop  ASA# 8 - Peds PONV Prevention: NA - Not pediatric patient, not GA or 2 or more risk factors NOT present  PQRS# 424 - Niesha-op Temp Management: 4559F - At least one body temp DOCUMENTED => 35.5C or 95.9F within required timeframe  PQRS# 426 - PACU Transfer Protocol: - Transfer of care checklist used  ASA# 14 - Acute Post-op Pain: ASA14B - Patient did NOT experience pain >= 7 out of 10

## 2021-05-30 ENCOUNTER — RECORDS - HEALTHEAST (OUTPATIENT)
Dept: ADMINISTRATIVE | Facility: CLINIC | Age: 76
End: 2021-05-30

## 2021-05-30 VITALS — WEIGHT: 169.3 LBS | BODY MASS INDEX: 28.61 KG/M2

## 2021-05-30 VITALS — BODY MASS INDEX: 28.43 KG/M2 | WEIGHT: 168.2 LBS

## 2021-05-31 VITALS — WEIGHT: 163.8 LBS | BODY MASS INDEX: 27.68 KG/M2

## 2021-05-31 VITALS — WEIGHT: 162.8 LBS | BODY MASS INDEX: 27.79 KG/M2 | HEIGHT: 64 IN

## 2021-05-31 VITALS — BODY MASS INDEX: 28.09 KG/M2 | WEIGHT: 166.2 LBS

## 2021-06-01 VITALS — BODY MASS INDEX: 28.11 KG/M2 | WEIGHT: 165.8 LBS

## 2021-06-02 ENCOUNTER — RECORDS - HEALTHEAST (OUTPATIENT)
Dept: ADMINISTRATIVE | Facility: CLINIC | Age: 76
End: 2021-06-02

## 2021-06-02 VITALS
BODY MASS INDEX: 26.8 KG/M2 | WEIGHT: 157 LBS | HEIGHT: 64 IN | HEIGHT: 64 IN | WEIGHT: 157 LBS | BODY MASS INDEX: 26.8 KG/M2

## 2021-06-02 VITALS — WEIGHT: 146.5 LBS | BODY MASS INDEX: 25.15 KG/M2

## 2021-06-02 VITALS — WEIGHT: 150 LBS | HEIGHT: 64 IN | BODY MASS INDEX: 25.61 KG/M2

## 2021-06-02 VITALS — BODY MASS INDEX: 25.27 KG/M2 | HEIGHT: 64 IN | WEIGHT: 148 LBS

## 2021-06-02 VITALS — WEIGHT: 155 LBS | HEIGHT: 64 IN | BODY MASS INDEX: 26.46 KG/M2

## 2021-06-02 VITALS — WEIGHT: 156 LBS | BODY MASS INDEX: 26.63 KG/M2 | HEIGHT: 64 IN

## 2021-06-02 VITALS — BODY MASS INDEX: 26.8 KG/M2 | HEIGHT: 64 IN | WEIGHT: 157 LBS

## 2021-06-02 VITALS — WEIGHT: 158.1 LBS | BODY MASS INDEX: 26.99 KG/M2 | HEIGHT: 64 IN

## 2021-06-02 VITALS — WEIGHT: 157 LBS | HEIGHT: 64 IN | BODY MASS INDEX: 26.8 KG/M2

## 2021-06-02 VITALS — HEIGHT: 64 IN | BODY MASS INDEX: 26.63 KG/M2 | WEIGHT: 156 LBS

## 2021-06-02 VITALS — BODY MASS INDEX: 28.49 KG/M2 | WEIGHT: 166 LBS

## 2021-06-02 VITALS — BODY MASS INDEX: 27.31 KG/M2 | HEIGHT: 64 IN | WEIGHT: 160 LBS

## 2021-06-02 NOTE — PROGRESS NOTES
[facility] Hematology and Oncology Progress Note    Patient: Norma Paul  MRN: [id number]  Date of Service:         Reason for Visit    Chief Complaint   Patient presents with     HE Cancer     Malignant neoplasm of female breast, unspecified estrogen receptor status, unspecified laterality, unspecified site of breast       Assessment and Plan    T1 cN0 M0 stage I right breast cancer status post lumpectomy and sentinel lymph node evaluation on October 27 2016, tumor is ER/ID positive and HER-2/ok negative, Oncotype DX recurrence score of 12    Patient continues to do well with no evidence of recurrence of breast cancer.  Recent mammogram is benign.  She will continue anastrozole.  We will see her again in 6 months for follow-up.    Bone density shows some bone thinning with moderate fracture risk.  Previously normal.  For now recommend that she take calcium and vitamin D in a regular basis.  Thousand to 1200 mg of calcium a day and 800 to 1200 units of vitamin D per day.    Plan: Continue anastrozole  Take calcium and vitamin D regularly  Follow-up in 6 months  Repeat bone density in 1 year    Measurable disease: None postoperatively    Current therapy: Anastrozole 1 mg by mouth daily started January 1 2017    Treatment history: Left lumpectomy and then adjuvant radiation therapy, 16 fractions completed December 2016      Breast cancer    Staging form: Breast, AJCC 7th Edition      Clinical stage from 11/8/2016: Stage IA (T1c, N0, M0) - Unsigned    ECOG Performance   ECOG Performance Status: 0    Distress Assessment  Distress Assessment Score: 3(results today)    Pain         Problem List    1. Malignant neoplasm of female breast, unspecified estrogen receptor status, unspecified laterality, unspecified site of breast (H)          CC: Mark Astudillo MD    ______________________________________________________________________________    History of Present Illness    Ms. Norma Paul returns for reevaluation.   "She was seen 6 months ago.  Continues anastrozole.  No headaches or dizziness.  No shortness of breath or cough.  No breast changes.  No new bone or abdominal pain.  ECOG status remains 0.  Had recent mammogram and bone density.    Pain Status  Currently in Pain: No/denies    Review of Systems    As per the HPI.       Integumentary     Patient Coping  Patient Coping: Accepting  Distress Assessment  Distress Assessment Score: 3(results today)  Accompanied by  Accompanied by: Family Member    Past History  Past Medical History:   Diagnosis Date     Breast cancer (H) 2016     Cystocele with prolapse      Hx of radiation therapy      Hyperlipidemia      Indwelling catheter present on admission      Prolapse, uterovaginal          Past Surgical History:   Procedure Laterality Date     ABDOMINAL SURGERY       BREAST BIOPSY Right 2016     BREAST LUMPECTOMY Right 10/27/2016    DR. PEARSON      SECTION       COLON SURGERY      sigmoidectomy for diverticulitis     COLONOSCOPY N/A 2019    Procedure: COLONOSCOPY;  Surgeon: Shukri Matute MD;  Location: [facility];  Service: General     CYSTOSCOPY N/A 2019    Procedure: CYSTOSCOPY;  Surgeon: Berna Carter MD;  Location: [facility];  Service: Urology     EYE SURGERY      bilateral cataract surgery     HYSTERECTOMY       RI LAP,SURG,COLECTOMY, PARTIAL, W/ANAST N/A 2019    Procedure: COLECTOMY, SIGMOID, LAPAROSCOPIC;  Surgeon: Shukri Matute MD;  Location: [facility];  Service: General     VITRECTOMY Left 2009    retinal reattachment     VITRECTOMY Left        Physical Exam    Recent Vitals 10/22/2019   Height 5' 4\"   Weight 146 lbs 6 oz   BSA (m2) 1.73 m2   /77   Pulse 86   Temp 97.5   Temp src 1   SpO2 97   Some recent data might be hidden       GENERAL: Alert and oriented. Seated comfortably. In no distress.    HEAD: Atraumatic and normocephalic.  Has a full head of hair.    EYES: GALA, EOMI.  No " pallor.  No icterus.    Oral cavity: no mucosal lesion or tonsillar enlargement.    NECK: supple. JVP normal.  No thyroid enlargement.    LYMPH NODES: No palpable, cervical, axillary or inguinal lymphadenopathy.    CHEST: clear to auscultation bilaterally.  Resonant to percussion throughout bilaterally.  Symmetrical breath movements bilaterally.    CVS: S1 and S2 are heard. Regular rate and rhythm.  No murmur or gallop or rub heard.    Breasts: Exam deferred today      ABDOMEN: Soft. Not tender. Not distended.  No palpable hepatomegaly or splenomegaly.  No other mass palpable.  Bowel sounds heard.    EXTREMITIES: Warm.  No peripheral edema.    SKIN: no rash, or bruising or purpura.    CNS: Nonfocal        Lab Results    No results found for this or any previous visit (from the past 168 hour(s)).    Imaging    Dxa Bone Density Scan    Result Date: 10/11/2019  10/10/2019 RE: Norma Paul YOB: 1945 Dear Tera Richey, Patient Profile: 74 y.o. female, postmenopausal, is here for the first bone density test. History of fractures - Yes, Foot. Family history of osteoporosis - Yes;  mother.  Family history of hip fracture: None. Smoking history - No. Osteoporosis treatment past -  Yes;  HRT. Osteoporosis treatment current - No.  Chronic medical problems - Breast cancer, Hysterectomy, Inflammatory bowel disease and Radiation treatment. High risk medications -  Aromatase Inhibitor;  Yes, Currently. Assessment: 1. The spine bone density L1-L4 with T-score is 0.3. 2. Femoral bone densities show left femoral neck T- score -1.4 and right femoral neck T-score -1.4. 3. Trabecular bone score indicates good trabecular bone architecture. 74 y.o. female with LOW BONE DENSITY (OSTEOPENIA) and MODERATE fracture risk, adjusted for the TBS, with major osteoporotic fracture risk 9.8 % and hip fracture risk 2.0 %. Previous bone density scans have been performed on a different scanner.  Therefore, the results are  not directly comparable. Recommendations: Appropriate calcium, vitamin D supplements, along with balance and weight bearing exercise recommended with follow up bone density scan in 1 to 2 years for close monitoring while on an aromatase inhibitor.. Bone densitometry was performed on your patient using our [place] iDXA densitometer. The results are summarized and a copy of the actual scans are included for your review. In conformity with the International Society of Clinical Densitometry's most recent position statement for DXA interpretation (2015), the diagnosis will be made on the lowest measured T-score of the lumbar spine, femoral neck, total proximal femur or 33% radius. Note the change in terminology for diagnostic classification from OSTEOPENIA to LOW BONE MASS. All trending for sequential exams will be done using multiple vertebrae or the total proximal femur. Fracture risk is based on the WHO Fracture Risk Assessment Tool (FRAX). If additional information is needed or if you would like to discuss the results, please do not hesitate to call me. Thank you for referring this patient to [facility]. We are happy to be of service in support of you and your practice. If you have any questions or suggestions to improve our service, please call me at [phone]. Sincerely, Jerrica Sabillon M.D. TYSONCCYNTHIA. Osteoporosis Services, [facility]    Mammo Screening Bilateral    Result Date: 10/17/2019  BILATERAL FULL FIELD DIGITAL SCREENING MAMMOGRAM Performed on: 10/17/19 Compared to: 10/16/2018 Mammo Screening Bilateral, 10/10/2017 Mammo Diagnostic Bilateral, 10/27/2016 Mammo Breast Specimen, 10/27/2016 Image Guided Breast Loc W Sent Node Inj Right, 10/27/2016 Mammo Post Clip Placement Right, 10/10/2016 Mammo Post Clip Placement Right, 10/10/2016 US Breast Core Biopsy Right, 10/06/2016 US Breast Limited (Focal) Right, 10/06/2016 Mammo Diagnostic Right, 10/04/2016 Mammo Screening Bilateral, and  02/28/2011 Mammo Screening Bilateral Findings: The breasts are heterogeneously dense, which may obscure small masses. There are postsurgical lumpectomy changes in the right breast. No evidence for malignancy and no change from the previous exam(s). Continue routine screening mammogram as recommended. ACR BI-RADS Category 2: Benign Findings        Signed by: Tera Richey MD

## 2021-06-02 NOTE — PROGRESS NOTES
Patient is here today for provider visit for Malignant neoplasm of female breast, unspecified estrogen receptor status, unspecified laterality, unspecified site of breast.

## 2021-06-03 VITALS
BODY MASS INDEX: 25 KG/M2 | WEIGHT: 146.4 LBS | HEART RATE: 86 BPM | TEMPERATURE: 97.5 F | OXYGEN SATURATION: 97 % | HEIGHT: 64 IN | DIASTOLIC BLOOD PRESSURE: 77 MMHG | SYSTOLIC BLOOD PRESSURE: 150 MMHG

## 2021-06-03 VITALS — WEIGHT: 151.7 LBS | HEIGHT: 65 IN | BODY MASS INDEX: 25.27 KG/M2

## 2021-06-03 NOTE — TELEPHONE ENCOUNTER
I talked to Jony and there is going to be a change in coverage for the Anastrazole in 2020 which will cause there to be a higher copay for it. I was able to get them a Geraldo through the [employer]. I also gave this info the Delmy Munoz.     Member ID: [id number]  Group ID: [id number]  RxBin ID: [id number]  PCN: [id number]  Eligibility Start Date: 08/14/2019  Eligibility End Date: 11/11/2020  Assistance Amount: $5,500.00

## 2021-06-04 VITALS
DIASTOLIC BLOOD PRESSURE: 87 MMHG | OXYGEN SATURATION: 97 % | BODY MASS INDEX: 28.55 KG/M2 | TEMPERATURE: 98.7 F | HEART RATE: 84 BPM | WEIGHT: 166.3 LBS | SYSTOLIC BLOOD PRESSURE: 139 MMHG

## 2021-06-05 VITALS
HEIGHT: 64 IN | WEIGHT: 160.9 LBS | BODY MASS INDEX: 27.47 KG/M2 | SYSTOLIC BLOOD PRESSURE: 138 MMHG | DIASTOLIC BLOOD PRESSURE: 79 MMHG | OXYGEN SATURATION: 94 % | HEART RATE: 94 BPM

## 2021-06-05 VITALS
HEART RATE: 93 BPM | WEIGHT: 166 LBS | BODY MASS INDEX: 28.49 KG/M2 | OXYGEN SATURATION: 97 % | DIASTOLIC BLOOD PRESSURE: 95 MMHG | SYSTOLIC BLOOD PRESSURE: 150 MMHG | TEMPERATURE: 98.5 F

## 2021-06-07 NOTE — PROGRESS NOTES
[facility] Hematology and Oncology Progress Note    Patient: Norma Paul  MRN: [id number]  Date of Service: 04/23/2020        Reason for Visit    Chief Complaint   Patient presents with     HE Cancer     Malignant neoplasm of female breast       Assessment and Plan  Cancer Staging  Malignant neoplasm of female breast, unspecified estrogen receptor status, unspecified laterality, unspecified site of breast (H)  Staging form: Breast, AJCC 7th Edition  - Clinical stage from 11/8/2016: Stage IA (T1c, N0, M0) - Unsigned  ER Status: Positive  NV Status: Negative  HER2 Status: Negative      1. Breast cancer: stage IA. Surgery on 10/27/18. Oncotype 12. She is taking anastrozole and tolerating well. She will return in 6 months. She is due for mammogram at that time.     2. Tenderness in right axilla: has noticed for about 6 weeks. Mild, but bothers her at night. Nothing found on exam. Will watch this. If worsens, may do US. encourage her to take tylenol at bedtime or use heat. Could be musculoskeletal.     3. Worsening bone density, osteopenia: she is at high risk for osteoporosis with aromatase inhibitors. She is taking calcium and vit d. I would like to repeat DEXA in October.       ECOG Performance   ECOG Performance Status: 1     Distress Assessment  Distress Assessment Score: 3    Pain  Currently in Pain: No/denies      Problem List    1. Malignant neoplasm of female breast, unspecified estrogen receptor status, unspecified laterality, unspecified site of breast (H)  DXA Bone Density Scan    DXA Bone Density Scan    CANCELED: Mammo Screening Bilateral   2. Aromatase inhibitor use  DXA Bone Density Scan    DXA Bone Density Scan   3. Osteopenia of multiple sites  DXA Bone Density Scan    DXA Bone Density Scan        ______________________________________________________________________________    History of Present Illness    Measurable disease: None postoperatively     Current therapy: Anastrozole 1 mg by mouth daily  started January 1, 2017     Treatment history: Left lumpectomy and then adjuvant radiation therapy, 16 fractions completed December 2016    Interim History: pt is here today for follow up visit. She is doing well except has noticed some tenderness in right armpit. Breast is mildly tender when touched. Has been worse over last 6 weeks. No swelling or lumps. States it sometimes hurts when she is sleeping and when she props a pillow underneath, it feels better. Hasn't tried any medications.     Pain Status  Currently in Pain: No/denies    Review of Systems    Constitutional  Constitutional (WDL): Exceptions to WDL  Fatigue: Fatigue relieved by rest(Wakes up at night, has trouble falling back asleep.)  Neurosensory  Neurosensory (WDL): All neurosensory elements are within defined limits  Eye   Eye Disorder (WDL): All eye disorder elements are within defined limits(Eye glasses.)  Ear  Ear Disorder (WDL): All ear disorder elements are within defined limits  Cardiovascular  Cardiovascular (WDL): All cardiovascular elements are within defined limits  Pulmonary  Respiratory (WDL): Within Defined Limits  Gastrointestinal  Gastrointestinal (WDL): Exceptions to WDL  Constipation: Occasional or intermittent symptoms, occasional use of stool softeners, laxatives, dietary modification, or enema(Occassional.)  Genitourinary  Genitourinary (WDL): All genitourinary elements are within defined limits  Lymphatic  Lymph (WDL): Exceptions to WDL(Rt underarm soreness.)  Musculoskeletal and Connective Tissue  Musculoskeletal and Connetive Tissue Disorders (WDL): All Musculoskeletal and Connetive Tissue Disorder elements are within defined limits  Integumentary  Integumentary (WDL): All integumentary elements are within defined limits  Patient Coping  Patient Coping: Accepting  Distress Assessment  Distress Assessment Score: 3  Accompanied by  Accompanied by: Alone  Oral Chemo Adherence       Past History  Past Medical History:   Diagnosis  Date     Breast cancer (H) 2016     Cystocele with prolapse      Hx of radiation therapy      Hyperlipidemia      Indwelling catheter present on admission      Prolapse, uterovaginal        PHYSICAL EXAM:  /87   Pulse 84   Temp 98.7  F (37.1  C) (Oral)   Wt 166 lb 4.8 oz (75.4 kg)   SpO2 97%   BMI 28.55 kg/m    GENERAL: no acute distress. Cooperative in conversation. Here alone due to visitor restrictions.   RESP: Regular respiratory rate. No expiratory wheezes   MUSCULOSKELETAL: No lower extremity swelling.   NEURO: non focal. Alert and oriented x3.   PSYCH: within normal limits. No depression or anxiety.  SKIN: visible skin is dry intact   LYMPH: no axillary lymphadenopathy  BREAST: examined bilateral breasts. No abnormal rashes or lesions. Just mildly tender in right breast and axilla. No clinical evidence of recurrence        Lab Results    No results found for this or any previous visit (from the past 24 hour(s)).      Imaging    No results found.      Signed by: Ammy Wilkerson, CNP

## 2021-06-08 NOTE — PROGRESS NOTES
[facility] Radiation Oncology Follow-up Note    Patient: Norma Paul  MRN: [id number]  Date of Service: 2017    Assessment / Impression     1. Malignant neoplasm of central portion of right female breast       Breast cancer    Staging form: Breast, AJCC 7th Edition      Clinical stage from 2016: Stage IA (T1c, N0, M0) - Unsigned    ECOG Performance: ECOG Performance Status: 0  Distress Assessment: Distress Assessment Score: 2  Adjuvant hormonal therapy:Yes:  Agent: Anastrozole (arimidex) Start:Following radiation  2017  Plan:   F/u 6 months prn   COntinue ROM for 6 months.   Face to face time  25 minutes with > 75% spent on consultation, education and coordination of care     Subjective:      HPI: Norma Paul is a 71 y.o. female with right ER+ breast cancer.     FIiished 16 treatments 4256 cGy on 2017. Started Arimidex 2017 tolerating it well. ROM exercises going ok.   Chief Complaint   Patient presents with     HE Cancer     Radiation     Breast Cancer   .    The following portions of the patient's history were reviewed and updated as appropriate: allergies, current medications, past family history, past medical history, past social history, past surgical history and problem list.    Current Outpatient Prescriptions   Medication Sig Dispense Refill     anastrozole (ARIMIDEX) 1 mg tablet Take 1 tablet (1 mg total) by mouth daily. 90 tablet 3     No current facility-administered medications for this visit.        REVIEW OF SYSTEMS     General: Site: right breast  Comfort: KPS: 90% Can perform normal activity, minor signs of disease  Fatigue (ONS scale) : 4: Moderate Fatigue  Hot Flashes and/or Flushes: 0: None  Nutrition: Anorexia: 0: None  Nausea: 0: None  Vomitin: None  Skin: Skin Sensation: 0: No problem  Skin Reaction: 0: None  Mucous Membranes: Drainage: 0: Absent  Sexuality: No Data Recorded  Emotional: Copin: Effective  Vital Signs: SpO2: 98 %    Objective:     Physical  Exam    Recent Vitals 2/3/2017   Height -   Weight 168 lbs 3 oz   BSA (m2) -   /86   Pulse 76   Temp 98.2   Temp src 1   SpO2 98       Expected post radiation change right breast. No suspicious findings. Good ROM     Recent Labs: No results found for this or any previous visit (from the past 168 hour(s)).    Pathology:   Pathology Results     Malignant - Core biopsy, Right - 10/10/2016      Pathology Code Malignancy Type    Invasive ductal carcinoma Invasive    Ductal carcinoma in situ intermediate nuclear grade Non-Invasive    Lobular carcinoma in situ (LCIS)           Additional Information            Concordance:  Not Entered Estrogen:  Positive     Progesterone:  Positive    Stage:  1A     Histology Grade:  G1 Margin Status:  Uninvolved    HER2/ok IHC:  1+     HER2/ok FISH:  Negative          Removed:  2     Positive:  0          Overall Size:  12 mm     Additional Comments:  Updated with 10/27/16 [facility] report [id number]. [facility]. Dr. ZACH Pickett.                      Signed by: Shanika Back MD

## 2021-06-08 NOTE — TELEPHONE ENCOUNTER
Called patient to see how her right breast/armpit is feeling since last visit. Patient reports that it has been improving. Encouraged patient to call if symptoms were to change and worsen, we would then want to do further imaging. Patient verbalized understanding. Naomi Astudillo, [facility]

## 2021-06-08 NOTE — TELEPHONE ENCOUNTER
----- Message from Ammy Wilkerson CNP sent at 4/23/2020  9:25 AM CDT -----  Can you call pt to see how her breast/armpit is feeling? Th.

## 2021-06-08 NOTE — PROGRESS NOTES
Pt ambulatory to radiation clinic for follow up, accompanied by . Pt feeling well. Further recommendations and orders per provider.

## 2021-06-09 NOTE — PROGRESS NOTES
[facility] Hematology and Oncology Progress Note    Patient: Norma Paul  MRN: [id number]  Date of Service:         Reason for Visit    Chief Complaint   Patient presents with     HE Cancer     Breast cancer - follow up       Assessment and Plan    T1 cN0 M0 stage I right breast cancer status post lumpectomy and sentinel lymph node evaluation on October 27 2016, tumor is ER/MO positive and HER-2/ok negative, Oncotype DX recurrence score of 12    Patient has completed radiation therapy around December 22, 2016.  She started anastrozole January 1 and has been tolerating it fairly well.  We'll have her continue the medication daily.  I asked her to take calcium and vitamin D as well.  She is doing fine with no evidence of recurrence.  I will see her again in 3 months for a follow-up.    Plan: Continue anastrozole 1 mg by mouth daily  Continue Calcium with vitamin D  Follow-up in 3 months    Measurable disease: None postoperatively    Current therapy: Anastrozole 1 mg by mouth daily started generally first 2017    Treatment history: Left lumpectomy and then adjuvant radiation therapy, 16 fractions completed December 2016      Breast cancer    Staging form: Breast, AJCC 7th Edition      Clinical stage from 11/8/2016: Stage IA (T1c, N0, M0) - Unsigned    ECOG Performance   ECOG Performance Status: 1    Distress Assessment  Distress Assessment Score: 4    Pain  Pain Score (Initial OR Reassessment): 2      Problem List    1. Breast cancer          CC: Mark Astudillo MD    ______________________________________________________________________________    History of Present Illness    Ms. Norma Paul returns for reevaluation.  She was seen 3 months ago.  She completed radiation therapy in late December and started anastrozole January 1.  She does have some lateral leg achiness which is tolerable.  No headaches dizziness or focal weakness.  No hair loss.  No shortness of breath or cough.  No new bone or abdominal pain.   No change in bowels or urine.  No bleeding or rash.  ECOG status remains 0.        Pain Status  Currently in Pain: Yes    Review of Systems    Constitutional  Constitutional (WDL): Exceptions to WDL  Fatigue: Fatigue relieved by rest (Doing better.)  Weight Gain: 5 - <10% from baseline (Up 7 lbs since .)  Neurosensory  Neurosensory (WDL): All neurosensory elements are within defined limits  Cardiovascular  Cardiovascular (WDL): All cardiovascular elements are within defined limits  Pulmonary  Respiratory (WDL): Within Defined Limits  Gastrointestinal  Gastrointestinal (WDL): Exceptions to WDL  Constipation: Occasional or intermittent symptoms, occasional use of stool softeners, laxatives, dietary modification, or enema (On going. )  Genitourinary  Genitourinary (WDL): All genitourinary elements are within defined limits  Integumentary  Integumentary (WDL): All integumentary elements are within defined limits  Patient Coping  Patient Coping: Accepting;Anxiety  Distress Assessment  Distress Assessment Score: 4  Accompanied by  Accompanied by: Family Member    Past History  Past Medical History:   Diagnosis Date     Breast cancer          Past Surgical History:   Procedure Laterality Date     BREAST LUMPECTOMY Right 10/27/2016    DR. PEARSON      SECTION       HYSTERECTOMY         Physical Exam    Recent Vitals 3/7/2017   Weight 169 lbs 5 oz   /89   Pulse 74   Temp 98   Temp src 1   SpO2 98       GENERAL: Alert and oriented. Seated comfortably. In no distress.    HEAD: Atraumatic and normocephalic.  Has a full head of hair.    EYES: GALA, EOMI.  No pallor.  No icterus.    Oral cavity: no mucosal lesion or tonsillar enlargement.    NECK: supple. JVP normal.  No thyroid enlargement.    LYMPH NODES: No palpable, cervical, axillary or inguinal lymphadenopathy.    CHEST: clear to auscultation bilaterally.  Resonant to percussion throughout bilaterally.  Symmetrical breath movements bilaterally.    CVS: S1  and S2 are heard. Regular rate and rhythm.  No murmur or gallop or rub heard.    ABDOMEN: Soft. Not tender. Not distended.  No palpable hepatomegaly or splenomegaly.  No other mass palpable.  Bowel sounds heard.    EXTREMITIES: Warm.  No peripheral edema.    SKIN: no rash, or bruising or purpura.    CNS: Nonfocal        Lab Results    No results found for this or any previous visit (from the past 168 hour(s)).    Imaging    No results found.      Signed by: Tera Richey MD

## 2021-06-11 NOTE — PROGRESS NOTES
[facility] Hematology and Oncology Progress Note    Patient: Norma Paul  MRN: [id number]  Date of Service:         Reason for Visit    Chief Complaint   Patient presents with     HE Cancer     Breast Cancer       Assessment and Plan    T1 cN0 M0 stage I right breast cancer status post lumpectomy and sentinel lymph node evaluation on October 27 2016, tumor is ER/PA positive and HER-2/ok negative, Oncotype DX recurrence score of 12    Patient is tolerating anastrozole well.  No evidence of recurrence of breast cancer.  She will continue the anastrozole and calcium with vitamin D.  I will see her again in 3 months for a follow-up.    She was given a copy of the survivorship plan and its contents were discussed with her in detail.    Plan: Continue anastrozole and calcium with vitamin D  Follow-up in 3 months    Measurable disease: None postoperatively    Current therapy: Anastrozole 1 mg by mouth daily started generally first 2017    Treatment history: Left lumpectomy and then adjuvant radiation therapy, 16 fractions completed December 2016      Breast cancer    Staging form: Breast, AJCC 7th Edition      Clinical stage from 11/8/2016: Stage IA (T1c, N0, M0) - Unsigned    ECOG Performance   ECOG Performance Status: 1    Distress Assessment  Distress Assessment Score: No distress    Pain         Problem List    1. Breast cancer          CC: Mark Astudillo MD    ______________________________________________________________________________    History of Present Illness    Ms. Norma Paul returns for reevaluation.  She was seen 3 months ago.  She continues anastrozole and is tolerating it well.  No headaches or dizziness.  No shortness of breath or cough.  No new bone or abdominal pain.    No side effects from the anastrozole.  She has started calcium with vitamin D.  ECOG status is 0.    Pain Status  Currently in Pain: No/denies    Review of Systems    Constitutional  Constitutional (WDL): Exceptions to  WDL  Fatigue: Fatigue relieved by rest (Improvement noted. )  Neurosensory  Neurosensory (WDL): All neurosensory elements are within defined limits  Cardiovascular  Cardiovascular (WDL): All cardiovascular elements are within defined limits  Pulmonary  Respiratory (WDL): Within Defined Limits  Gastrointestinal  Gastrointestinal (WDL): Exceptions to WDL  Constipation: Occasional or intermittent symptoms, occasional use of stool softeners, laxatives, dietary modification, or enema  Genitourinary  Genitourinary (WDL): All genitourinary elements are within defined limits  Integumentary  Integumentary (WDL): All integumentary elements are within defined limits  Patient Coping  Patient Coping: Accepting  Distress Assessment  Distress Assessment Score: No distress  Accompanied by  Accompanied by: Family Member    Past History  Past Medical History:   Diagnosis Date     Breast cancer          Past Surgical History:   Procedure Laterality Date     BREAST LUMPECTOMY Right 10/27/2016    DR. PEARSON      SECTION       HYSTERECTOMY         Physical Exam    Recent Vitals 2017   Weight 166 lbs 3 oz   /73   Pulse 84   Temp 98.6   Temp src 1   SpO2 98       GENERAL: Alert and oriented. Seated comfortably. In no distress.    HEAD: Atraumatic and normocephalic.  Has a full head of hair.    EYES: GALA, EOMI.  No pallor.  No icterus.    Oral cavity: no mucosal lesion or tonsillar enlargement.    NECK: supple. JVP normal.  No thyroid enlargement.    LYMPH NODES: No palpable, cervical, axillary or inguinal lymphadenopathy.    CHEST: clear to auscultation bilaterally.  Resonant to percussion throughout bilaterally.  Symmetrical breath movements bilaterally.    CVS: S1 and S2 are heard. Regular rate and rhythm.  No murmur or gallop or rub heard.    ABDOMEN: Soft. Not tender. Not distended.  No palpable hepatomegaly or splenomegaly.  No other mass palpable.  Bowel sounds heard.    EXTREMITIES: Warm.  No peripheral  edema.    SKIN: no rash, or bruising or purpura.    CNS: Nonfocal        Lab Results    No results found for this or any previous visit (from the past 168 hour(s)).    Imaging    No results found.      Signed by: Tera Richey MD

## 2021-06-11 NOTE — PROGRESS NOTES
I met with Norma and her  today to present the Survivorship Care Plan. I reviewed the contents of the plan, specifically the treatment summary and resources. We had a lengthy discussion regarding advanced directives as the  was frustrated that ppl keep asking for one. He felt that it was his role to give direction on her care should the need arise. I explained that if she were taken emergently to a hosp, the staff would be compelled to keep her alive with whatever means they deem necessary and that may not be her wish. So, if the adv dir was on file, they would know the limits of what her wishes are in terms of care. He expressed understanding and said he was not aware of all those details. They will bring in their copy the next time they come in. I also asked that she take a look at the care plan in the next 2 wks and I will give her call and see if she has any questions/comments. She thanked me and I congratulated her on her survivorship! Marybeth   Problem: Safety  Goal: Will remain free from falls  Pt educated on safety precautions, utilization of the call light, and bed alarm.  Pt verbalized understanding.    Problem: Infection  Goal: Will remain free from infection  Implement standard precautions and perform handwashing before and after patient contact. RN will follow protocols and necessary steps to minimize the spread of infection.  RN educated pt and any visitors on proper hand hygiene.

## 2021-06-12 NOTE — PROGRESS NOTES
Pt arrived at [facility] to see Ammy BLAKE. Pt has Breast Cancer and is here for f/u.

## 2021-06-12 NOTE — PATIENT INSTRUCTIONS - HE
Recent Results (from the past 24 hour(s))   Comprehensive Metabolic Panel   Result Value Ref Range    Sodium 143 136 - 145 mmol/L    Potassium 4.1 3.5 - 5.0 mmol/L    Chloride 104 98 - 107 mmol/L    CO2 30 22 - 31 mmol/L    Anion Gap, Calculation 9 5 - 18 mmol/L    Glucose 100 70 - 125 mg/dL    BUN 20 8 - 28 mg/dL    Creatinine 0.83 0.60 - 1.10 mg/dL    GFR MDRD Af Amer >60 >60 mL/min/1.73m2    GFR MDRD Non Af Amer >60 >60 mL/min/1.73m2    Bilirubin, Total 0.7 0.0 - 1.0 mg/dL    Calcium 9.4 8.5 - 10.5 mg/dL    Protein, Total 7.0 6.0 - 8.0 g/dL    Albumin 4.1 3.5 - 5.0 g/dL    Alkaline Phosphatase 78 45 - 120 U/L    AST 27 0 - 40 U/L    ALT 27 0 - 45 U/L

## 2021-06-12 NOTE — PROGRESS NOTES
[facility] Hematology and Oncology Progress Note    Patient: Norma Paul  MRN: [id number]  Date of Service:         Reason for Visit    Chief Complaint   Patient presents with     HE Cancer     Breast cancer       Assessment and Plan    T1 cN0 M0 stage I right breast cancer status post lumpectomy and sentinel lymph node evaluation on October 27 2016, tumor is ER/CT positive and HER-2/ok negative, Oncotype DX recurrence score of 12    Patient is doing well with no evidence of recurrence.  She is tolerating anastrozole.  She will continue the medication daily.  She is scheduled for mammogram and follow-up with the surgeon next month.  I will see her again in 6 months for a follow-up.    She will call with any side effects related to anastrozole or any new symptoms.      Measurable disease: None postoperatively    Current therapy: Anastrozole 1 mg by mouth daily started generally first 2017    Treatment history: Left lumpectomy and then adjuvant radiation therapy, 16 fractions completed December 2016      Breast cancer    Staging form: Breast, AJCC 7th Edition      Clinical stage from 11/8/2016: Stage IA (T1c, N0, M0) - Unsigned    ECOG Performance   ECOG Performance Status: 1    Distress Assessment  Distress Assessment Score: No distress    Pain         Problem List    1. Breast cancer  anastrozole (ARIMIDEX) 1 mg tablet        CC: Mark Astudillo MD    ______________________________________________________________________________    History of Present Illness    Ms. Norma Paul returns for reevaluation.  She was seen in March.  She continues on anastrozole and is tolerating it without problems.  No joint symptoms or hot flashes.    Denies headaches or dizziness.  No shortness of breath or cough.  No new bone or abdominal pain.  No change of bowels or urine.  No bleeding or rash.  ECOG status is 0.      Pain Status  Currently in Pain: No/denies    Review of Systems    Constitutional  Constitutional (WDL):  Exceptions to WDL  Fatigue: Fatigue relieved by rest  Neurosensory  Neurosensory (WDL): All neurosensory elements are within defined limits  Cardiovascular  Cardiovascular (WDL): All cardiovascular elements are within defined limits  Pulmonary  Respiratory (WDL): Within Defined Limits  Gastrointestinal  Gastrointestinal (WDL): Exceptions to WDL  Constipation: Occasional or intermittent symptoms, occasional use of stool softeners, laxatives, dietary modification, or enema  Genitourinary  Genitourinary (WDL): Exceptions to WDL  Urinary Frequency: Present  Integumentary  Integumentary (WDL): All integumentary elements are within defined limits  Patient Coping  Patient Coping: Accepting  Distress Assessment  Distress Assessment Score: No distress  Accompanied by  Accompanied by: Alone    Past History  Past Medical History:   Diagnosis Date     Breast cancer          Past Surgical History:   Procedure Laterality Date     BREAST LUMPECTOMY Right 10/27/2016    DR. PEARSON      SECTION       HYSTERECTOMY         Physical Exam    Recent Vitals 2017   Height -   Weight 163 lbs 13 oz   BSA (m2) -   /75   Pulse 80   Temp 98.1   Temp src 1   SpO2 96   Some recent data might be hidden       GENERAL: Alert and oriented. Seated comfortably. In no distress.    HEAD: Atraumatic and normocephalic.  Has a full head of hair.    EYES: GALA, EOMI.  No pallor.  No icterus.    Oral cavity: no mucosal lesion or tonsillar enlargement.    NECK: supple. JVP normal.  No thyroid enlargement.    LYMPH NODES: No palpable, cervical, axillary or inguinal lymphadenopathy.    CHEST: clear to auscultation bilaterally.  Resonant to percussion throughout bilaterally.  Symmetrical breath movements bilaterally.    CVS: S1 and S2 are heard. Regular rate and rhythm.  No murmur or gallop or rub heard.    Breasts: Right side reveals well-healed lumpectomy and x-ray scars.  No masses, no nipple inversion, no exhibit adenopathy.  Left breast is  benign.    ABDOMEN: Soft. Not tender. Not distended.  No palpable hepatomegaly or splenomegaly.  No other mass palpable.  Bowel sounds heard.    EXTREMITIES: Warm.  No peripheral edema.    SKIN: no rash, or bruising or purpura.    CNS: Nonfocal        Lab Results    No results found for this or any previous visit (from the past 168 hour(s)).    Imaging    No results found.      Signed by: Tera Richey MD

## 2021-06-12 NOTE — PROGRESS NOTES
[facility] Hematology and Oncology Progress Note    Patient: Norma Paul  MRN: [id number]  Date of Service: 10/22/2020        Reason for Visit    Chief Complaint   Patient presents with     HE Cancer       Assessment and Plan  Cancer Staging  Malignant neoplasm of female breast, unspecified estrogen receptor status, unspecified laterality, unspecified site of breast (H)  Staging form: Breast, AJCC 7th Edition  - Clinical stage from 11/8/2016: Stage IA (T1c, N0, M0) - Unsigned  ER Status: Positive  OH Status: Negative  HER2 Status: Negative      1. Breast cancer: stage IA. Surgery on 10/27/18. Oncotype 12. She is taking anastrozole and tolerating well. Started January 2017.  Mammogram this week is normal. She will return in 6 months.    2.  osteopenia: she is at high risk for osteoporosis with aromatase inhibitors. She is taking calcium and vit d. Her recent DEXA is stable and still showing osteopenia. We will continue calcium/vit d. Encourage weight bearing exercises. Repeat DEXA in 2022.       ECOG Performance   ECOG Performance Status: 0     Distress Assessment  Distress Assessment Score: 4    Pain  Currently in Pain: No/denies      Problem List    1. Breast cancer (H)  anastrozole (ARIMIDEX) 1 mg tablet   2. Malignant neoplasm of female breast, unspecified estrogen receptor status, unspecified laterality, unspecified site of breast (H)  anastrozole (ARIMIDEX) 1 mg tablet        ______________________________________________________________________________    History of Present Illness    Measurable disease: None postoperatively     Current therapy: Anastrozole 1 mg by mouth daily started January 1, 2017     Treatment history: Left lumpectomy and then adjuvant radiation therapy, 16 fractions completed December 2016    Interim History: pt is here today for follow up visit. She is doing well    Pain Status  Currently in Pain: No/denies    Review of Systems    Constitutional  Constitutional (WDL): All  constitutional elements are within defined limits  Neurosensory  Neurosensory (WDL): All neurosensory elements are within defined limits  Eye   Eye Disorder (WDL): All eye disorder elements are within defined limits  Ear  Ear Disorder (WDL): All ear disorder elements are within defined limits  Cardiovascular  Cardiovascular (WDL): All cardiovascular elements are within defined limits  Pulmonary  Respiratory (WDL): Within Defined Limits  Gastrointestinal  Gastrointestinal (WDL): Exceptions to WDL  Constipation: Occasional or intermittent symptoms, occasional use of stool softeners, laxatives, dietary modification, or enema  Genitourinary  Genitourinary (WDL): All genitourinary elements are within defined limits  Lymphatic  Lymph (WDL): All lymph disorder elements are within defined limits  Musculoskeletal and Connective Tissue  Musculoskeletal and Connetive Tissue Disorders (WDL): All Musculoskeletal and Connetive Tissue Disorder elements are within defined limits  Integumentary  Integumentary (WDL): All integumentary elements are within defined limits  Patient Coping  Patient Coping: Accepting  Distress Assessment  Distress Assessment Score: 4  Accompanied by  Accompanied by: Alone  Oral Chemo Adherence       Past History  Past Medical History:   Diagnosis Date     Breast cancer (H) 2016     Cystocele with prolapse      Hx of radiation therapy      Hyperlipidemia      Indwelling catheter present on admission      Prolapse, uterovaginal        PHYSICAL EXAM:  BP (!) 150/95   Pulse 93   Temp 98.5  F (36.9  C)   Wt 166 lb (75.3 kg)   SpO2 97%   BMI 28.49 kg/m    GENERAL: no acute distress. Cooperative in conversation. Here alone due to visitor restrictions.   RESP: Regular respiratory rate. No expiratory wheezes   MUSCULOSKELETAL: No lower extremity swelling.   NEURO: non focal. Alert and oriented x3.   PSYCH: within normal limits. No depression or anxiety.  SKIN: visible skin is dry intact   BREAST: deferred.  Just had mammo      Lab Results    Recent Results (from the past 24 hour(s))   Comprehensive Metabolic Panel   Result Value Ref Range    Sodium 143 136 - 145 mmol/L    Potassium 4.1 3.5 - 5.0 mmol/L    Chloride 104 98 - 107 mmol/L    CO2 30 22 - 31 mmol/L    Anion Gap, Calculation 9 5 - 18 mmol/L    Glucose 100 70 - 125 mg/dL    BUN 20 8 - 28 mg/dL    Creatinine 0.83 0.60 - 1.10 mg/dL    GFR MDRD Af Amer >60 >60 mL/min/1.73m2    GFR MDRD Non Af Amer >60 >60 mL/min/1.73m2    Bilirubin, Total 0.7 0.0 - 1.0 mg/dL    Calcium 9.4 8.5 - 10.5 mg/dL    Protein, Total 7.0 6.0 - 8.0 g/dL    Albumin 4.1 3.5 - 5.0 g/dL    Alkaline Phosphatase 78 45 - 120 U/L    AST 27 0 - 40 U/L    ALT 27 0 - 45 U/L         Imaging    Dxa Bone Density Scan    Result Date: 10/14/2020  10/12/2020 RE: Norma Paul YOB: 1945 Dear Ammy Wilkerson, Patient Profile: 75 y.o. female, postmenopausal, is here for the follow up bone density test. History of fractures - None. Family history of osteoporosis - None.  Family history of hip fracture: None. Smoking history - No. Osteoporosis treatment past -  No. Osteoporosis treatment current - No.  Chronic medical problems - Breast cancer, Hysterectomy and Radiation treatment. High risk medications -  Aromatase Inhibitor;  Yes, Currently. Assessment: 1. The spine bone density L1-L4 with T-score 0.0. 2. Femoral bone densities show left femoral neck T- score -1.3 and right femoral neck T-score -1.2. 3. Trabecular bone score indicates good trabecular bone architecture. 75 y.o. female with LOW BONE DENSITY (OSTEOPENIA) and MODERATE fracture risk, adjusted for the TBS, with major osteoporotic fracture risk 10.2% and hip fracture risk 2.0%. Since the previous bone density dated  October 10, 2018, there has been no statistically significant % change in the bone density of the spine and left total hip.  Additionally there has been a -4.8% change  in the right total hip. Recommendations:  Appropriate calcium, vitamin D supplements, along with balance and weight bearing exercise recommended with follow up bone density scan in 1 to 2 years, for close monitoring while on an aromatase inhibitor. Bone densitometry was performed on your patient using our [employer] iDXA densitometer. The results are summarized and a copy of the actual scans are included for your review. In conformity with the International Society of Clinical Densitometry's most recent position statement for DXA interpretation (2015), the diagnosis will be made on the lowest measured T-score of the lumbar spine, femoral neck, total proximal femur or 33% radius. Note the change in terminology for diagnostic classification from OSTEOPENIA to LOW BONE MASS. All trending for sequential exams will be done using multiple vertebrae or the total proximal femur. Fracture risk is based on the WHO Fracture Risk Assessment Tool (FRAX). If additional information is needed or if you would like to discuss the results, please do not hesitate to call me. Thank you for referring this patient to [facility]. We are happy to be of service in support of you and your practice. If you have any questions or suggestions to improve our service, please call me at [phone]. Sincerely, Jerrica Sabillon M.D. TYSONCCYNTHIA. Osteoporosis Services, [facility]    Mammo Screening Bilateral    Result Date: 10/19/2020  BILATERAL FULL FIELD DIGITAL SCREENING MAMMOGRAM WITH TOMOSYNTHESIS Performed on: 10/19/20 Compared to: 10/17/2019 Mammo Screening Bilateral, 10/16/2018 Mammo Screening Bilateral, and 10/10/2017 Mammo Diagnostic Bilateral Findings:The breasts are heterogeneously dense, which may obscure small masses.There is no radiographic evidence of malignancy. This study was evaluated with the assistance of Computer-Aided Detection.  Breast Tomosynthesis was used in interpretation. Repeat routine screening mammogram in one year is recommended. There are  breast conservation changes in the right breast. ACR BI-RADS Category 2: Benign        Signed by: Ammy Wilkerson CNP

## 2021-06-13 NOTE — TELEPHONE ENCOUNTER
Norma's virgilio was . I was able to renew it for her.      Member ID: [id number]  Group ID: [id number]  RxBin ID: [id number]  PCN: [id number]  Eligibility Start Date: 2020  Eligibility End Date: 2021  Assistance Amount: $5,400.00    Thank you,   Kun Malave   Oral Oncology Liaison   P:[phone]  F:[phone]

## 2021-06-14 NOTE — PROGRESS NOTES
"This is a 72 y.o. woman who comes in for  continued follow-up of her right breast cancer.  She is now 12 months  status post right partial mastectomy with radiation.  She is feeling well.  She has no new complaints today.  Her only complaint at all is constipation and she is wondering if the anastrozole could be making that worse.      Please see the chart review for PMH, Meds, allergies, FH and SH.    ROS:  A 12 point comprehensive review of systems was negative except as noted.      Physical Exam:  /79 (Patient Site: Right Arm, Patient Position: Sitting, Cuff Size: Adult Large)  Pulse 77  Ht 5' 4.4\" (1.636 m)  Wt 162 lb 12.8 oz (73.8 kg)  SpO2 100%  Breastfeeding? No  BMI 27.6 kg/m2  General appearance: alert, appears stated age and cooperative  Breasts: There are no palpable masses. There are minimal radiation changes. The scar has healed up well.  Lymph nodes: Cervical, supraclavicular, and axillary nodes normal.  Neurologic: Grossly normal    Data Review: Reviewed her current mammograms. No evidence of disease.      Impression: Personal History of breast cancer. NOD.  She is overall doing well.  Gave her the option of continuing to follow-up with me or told her she can just follow-up as needed.  She will continue to see Dr. Richey.  She should continue with yearly mammograms.  I prefer 3D mammography.    Plan: Follow up with me as needed.  Continue with yearly 3D mammography.    "

## 2021-06-16 PROBLEM — N17.9 AKI (ACUTE KIDNEY INJURY) (H): Status: ACTIVE | Noted: 2018-10-08

## 2021-06-16 PROBLEM — E86.0 DEHYDRATION: Status: ACTIVE | Noted: 2018-10-08

## 2021-06-16 PROBLEM — E87.1 HYPONATREMIA: Status: ACTIVE | Noted: 2018-10-08

## 2021-06-16 PROBLEM — A41.9 SEPSIS (H): Status: ACTIVE | Noted: 2018-10-08

## 2021-06-16 PROBLEM — N73.9 PELVIC ABSCESS IN FEMALE: Status: ACTIVE | Noted: 2018-10-10

## 2021-06-16 PROBLEM — N39.0 URINARY TRACT INFECTION: Status: ACTIVE | Noted: 2018-10-08

## 2021-06-16 PROBLEM — N81.4 UTERINE PROLAPSE: Status: ACTIVE | Noted: 2018-10-08

## 2021-06-16 PROBLEM — N81.9 VAGINAL VAULT PROLAPSE: Status: ACTIVE | Noted: 2019-04-25

## 2021-06-16 PROBLEM — N13.30 HYDRONEPHROSIS: Status: ACTIVE | Noted: 2018-10-08

## 2021-06-16 PROBLEM — K57.92 DIVERTICULITIS: Status: ACTIVE | Noted: 2019-01-31

## 2021-06-16 PROBLEM — R33.9 URINARY RETENTION: Status: ACTIVE | Noted: 2018-10-08

## 2021-06-16 PROBLEM — D72.829 LEUKOCYTOSIS: Status: ACTIVE | Noted: 2018-10-08

## 2021-06-16 PROBLEM — N20.0 RENAL STONE: Status: ACTIVE | Noted: 2018-10-08

## 2021-06-16 PROBLEM — Z79.811 AROMATASE INHIBITOR USE: Status: ACTIVE | Noted: 2019-04-16

## 2021-06-16 PROBLEM — K59.01 SLOW TRANSIT CONSTIPATION: Status: ACTIVE | Noted: 2018-10-14

## 2021-06-16 NOTE — TELEPHONE ENCOUNTER
PT and spouse calling.    Rcvd letter from [facility] to schedule colonoscopy.    Pt had colonoscopy in 2016 a polyup was found, removed and tested for cancer and was found to be non cancerous.     Pt has surgery with Dr. Matute in Feb of 2019 - pt and family requesting to know if they should schedule appt with [facility]    Pt does not at this time have a primary provider to seek direction from at this time and feel that Dr. Matute would be the best to provide direction.    Would like to know if when Dr. Matute  performed surgery in 2019 did he find anything to suggest that pt would need colonoscopy?     Requesting call back on cell  with recommendation if pt should schedule colonoscopy at [facility].

## 2021-06-17 NOTE — PROGRESS NOTES
[facility] Hematology and Oncology Progress Note    Patient: Norma Paul  MRN: [id number]  Date of Service:         Reason for Visit    Chief Complaint   Patient presents with     HE Cancer       Assessment and Plan    T1 cN0 M0 stage I right breast cancer status post lumpectomy and sentinel lymph node evaluation on October 27 2016, tumor is ER/TX positive and HER-2/ok negative, Oncotype DX recurrence score of 12    Patient continues to do well with no evidence of recurrence of breast cancer.  She will continue anastrozole.  I will see her again in 6 months with repeat screening mammograms.    She has some concern and questions about elevated cholesterol related to anastrozole.  Reviewed her labs from last April and there was minimal change in her cholesterol level.  She will have labs rechecked later this month and then follow-up with her primary.  Explained that other aromatase inhibitors may cause similar elevation in cholesterol but the risk is only about 10%.  If she requires intervention for elevated cholesterol would prefer use of agents to help control the cholesterol rather than changing the aromatase inhibitor.    Plan: Continue anastrozole 1 mg p.o. daily  Follow-up in 6 months with mammograms      Measurable disease: None postoperatively    Current therapy: Anastrozole 1 mg by mouth daily started generally first 2017    Treatment history: Left lumpectomy and then adjuvant radiation therapy, 16 fractions completed December 2016      Breast cancer    Staging form: Breast, AJCC 7th Edition      Clinical stage from 11/8/2016: Stage IA (T1c, N0, M0) - Unsigned    ECOG Performance   ECOG Performance Status: 1    Distress Assessment  Distress Assessment Score: 7 ( had recent surgery)    Pain         Problem List    1. Breast cancer  Mammo Screening Bilateral   2. Encounter for screening mammogram for malignant neoplasm of breast   Mammo Screening Bilateral        CC: Mark Astudillo,  MD    ______________________________________________________________________________    History of Present Illness    Ms. Norma Paul returns for reevaluation.  She was seen a little over 6 months ago.  Continues on anastrozole.  Has noted some mild constipation.  No hot flashes and no significant joint symptoms.  Denies new headaches.  No change with breathing.  No new bone or abdominal pain.  She did have mammogram and meet with the surgeon last October.  No breast changes.  ECOG status 0.    Pain Status  Currently in Pain: No/denies    Review of Systems    Constitutional  Constitutional (WDL): Exceptions to WDL  Fatigue: Fatigue relieved by rest  Neurosensory  Neurosensory (WDL): All neurosensory elements are within defined limits  Cardiovascular  Cardiovascular (WDL): All cardiovascular elements are within defined limits  Pulmonary  Respiratory (WDL): Within Defined Limits  Gastrointestinal  Gastrointestinal (WDL): Exceptions to WDL  Constipation: Occasional or intermittent symptoms, occasional use of stool softeners, laxatives, dietary modification, or enema  Genitourinary  Genitourinary (WDL): Exceptions to WDL  Urinary Frequency: Present  Integumentary  Integumentary (WDL): All integumentary elements are within defined limits  Patient Coping  Patient Coping: Accepting  Distress Assessment  Distress Assessment Score: 7 ( had recent surgery)  Accompanied by  Accompanied by: Family Member    Past History  Past Medical History:   Diagnosis Date     Breast cancer      Hx of radiation therapy          Past Surgical History:   Procedure Laterality Date     BREAST LUMPECTOMY Right 10/27/2016    DR. PEARSON      SECTION       HYSTERECTOMY         Physical Exam    Recent Vitals 2018   Height -   Weight 165 lbs 13 oz   BSA (m2) -   /72   Pulse 83   Temp 98.4   Temp src 1   SpO2 98   Some recent data might be hidden       GENERAL: Alert and oriented. Seated comfortably. In no distress.    HEAD:  Atraumatic and normocephalic.  Has a full head of hair.    EYES: GALA, EOMI.  No pallor.  No icterus.    Oral cavity: no mucosal lesion or tonsillar enlargement.    NECK: supple. JVP normal.  No thyroid enlargement.    LYMPH NODES: No palpable, cervical, axillary or inguinal lymphadenopathy.    CHEST: clear to auscultation bilaterally.  Resonant to percussion throughout bilaterally.  Symmetrical breath movements bilaterally.    CVS: S1 and S2 are heard. Regular rate and rhythm.  No murmur or gallop or rub heard.    Breasts: Exam deferred today    ABDOMEN: Soft. Not tender. Not distended.  No palpable hepatomegaly or splenomegaly.  No other mass palpable.  Bowel sounds heard.    EXTREMITIES: Warm.  No peripheral edema.    SKIN: no rash, or bruising or purpura.    CNS: Nonfocal        Lab Results    No results found for this or any previous visit (from the past 168 hour(s)).    Imaging    No results found.      Signed by: Tera Richey MD

## 2021-06-17 NOTE — PROGRESS NOTES
Pt ambulatory to medical oncology for follow up appointment. Further recommendations and orders per provider.

## 2021-06-17 NOTE — PROGRESS NOTES
"Oncology Rooming Note    05/03/21 10:22 AM    Norma Paul is a 76 y.o. female who presents for:    Chief Complaint   Patient presents with     HE Cancer     Malignant neoplasm of female breast, unspecified estrogen receptor status, unspecified laterality, unspecified site of breast       Initial Vitals: /79   Pulse 94   Ht 5' 4\" (1.626 m)   Wt 160 lb 14.4 oz (73 kg)   SpO2 94%   BMI 27.62 kg/m       Estimated body mass index is 27.62 kg/m  as calculated from the following:    Height as of this encounter: 5' 4\" (1.626 m).    Weight as of this encounter: 160 lb 14.4 oz (73 kg).     Body surface area is 1.82 meters squared.      Allergies reviewed: Yes  Medications reviewed: Yes    Refills needed: No        Clinical concerns: no concerns      Naomi Astudillo CMA      "

## 2021-06-17 NOTE — PROGRESS NOTES
[facility] Hematology and Oncology Progress Note    Patient: Norma Paul  MRN: [id number]  Date of Service: 05/03/2021        Reason for Visit    Chief Complaint   Patient presents with     HE Cancer     Malignant neoplasm of female breast, unspecified estrogen receptor status, unspecified laterality, unspecified site of breast       Assessment and Plan  Cancer Staging  Malignant neoplasm of female breast, unspecified estrogen receptor status, unspecified laterality, unspecified site of breast (H)  Staging form: Breast, AJCC 7th Edition  - Clinical stage from 11/8/2016: Stage IA (T1c, N0, M0) - Unsigned  ER Status: Positive  IN Status: Negative  HER2 Status: Negative      1. Breast cancer: stage IA. Surgery on 10/27/18. Oncotype 12. She is taking anastrozole and tolerating well. Started January 2017.  Mammogram in October was normal, will continue that annually. She will return in 6 months with  mammo.    2.  osteopenia: she is at high risk for osteoporosis with aromatase inhibitors. She is taking calcium and vit d. Her recent DEXA is stable and still showing osteopenia. We will continue calcium/vit d. Encourage weight bearing exercises. Repeat DEXA in 2022.       ECOG Performance   ECOG Performance Status: 0     Distress Assessment  Distress Assessment Score: No distress    Pain  Currently in Pain: No/denies      Problem List    1. Malignant neoplasm of female breast, unspecified estrogen receptor status, unspecified laterality, unspecified site of breast (H)  Mammo Screening Bilateral   2. Encounter for screening mammogram for malignant neoplasm of breast   Mammo Screening Bilateral        ______________________________________________________________________________    History of Present Illness    Measurable disease: None postoperatively     Current therapy: Anastrozole 1 mg by mouth daily started January 1, 2017     Treatment history: Left lumpectomy and then adjuvant radiation therapy, 16 fractions completed  "December 2016    Interim History: pt is here today for follow up visit. She is doing well. Tolerating anastrozole well. No new issues with breast.     Pain Status  Currently in Pain: No/denies    Review of Systems  Constitutional  Constitutional (WDL): Exceptions to WDL  Fatigue: Concerns(intermittently)  Weight Loss: Concerns(down 6 pounds since 10/2020)  Neurosensory  Neurosensory (WDL): All neurosensory elements are within defined limits  Eye   Eye Disorder (WDL): All eye disorder elements are within defined limits(glasses)  Ear  Ear Disorder (WDL): All ear disorder elements are within defined limits  Cardiovascular  Cardiovascular (WDL): All cardiovascular elements are within defined limits  Pulmonary  Respiratory (WDL): Within Defined Limits  Gastrointestinal  Gastrointestinal (WDL): Exceptions to WDL  Constipation: Concerns  Genitourinary  Genitourinary (WDL): All genitourinary elements are within defined limits  Lymphatic  Lymph (WDL): All lymph disorder elements are within defined limits  Musculoskeletal and Connective Tissue  Musculoskeletal and Connetive Tissue Disorders (WDL): All Musculoskeletal and Connetive Tissue Disorder elements are within defined limits  Integumentary  Integumentary (WDL): All integumentary elements are within defined limits  Patient Coping  Patient Coping: Accepting;Open/discussion  Accompanied by  Accompanied by: Alone  Oral Chemo Adherence         Past History  Past Medical History:   Diagnosis Date     Breast cancer (H) 2016     Cystocele with prolapse      Hx of radiation therapy      Hyperlipidemia      Indwelling catheter present on admission      Prolapse, uterovaginal        PHYSICAL EXAM:  /79   Pulse 94   Ht 5' 4\" (1.626 m)   Wt 160 lb 14.4 oz (73 kg)   SpO2 94%   BMI 27.62 kg/m    GENERAL: no acute distress. Cooperative in conversation. Here alone  RESP: Regular respiratory rate. No expiratory wheezes   MUSCULOSKELETAL: No lower extremity swelling.   NEURO: " non focal. Alert and oriented x3.   PSYCH: within normal limits. No depression or anxiety.  SKIN: visible skin is dry intact   BREAST: Bilateral exam done.  Lumpectomy incision is well-healed.  Very slight scarring.  No abnormal lesions or rashes noted.  No evidence for local recurrence bilaterally.        Lab Results    No results found for this or any previous visit (from the past 24 hour(s)).      Imaging    No results found.      Signed by: Ammy Wilkerson CNP

## 2021-06-18 NOTE — LETTER
Letter by Klarissa Hobson CMA at      Author: Klarissa Hobson CMA Service: -- Author Type: --    Filed:  Encounter Date: 1/14/2019 Status: (Other)       Norma Paul  [address] [place] [id number]

## 2021-06-21 NOTE — PROGRESS NOTES
" HPI: Norma Paul is here for follow up after discharge from the hospital for perforated diverticulitis with complication of an abscess and fistula.  She status post drain placement while in the hospital.  Had one drain removed last week, that was communicating with a collapsed abscess.  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  Was recently seen in infectious disease clinic, with plan to continue on oral antibiotics for 1 week following final drain removal.     /83 (Patient Site: Left Arm, Patient Position: Sitting, Cuff Size: Adult Regular)  Pulse 94  Ht 5' 4\" (1.626 m)  Wt 157 lb (71.2 kg)  SpO2 97%  Breastfeeding? No  BMI 26.95 kg/m2  Body mass index is 26.95 kg/(m^2).      EXAM:   GENERAL: Appears well  Abdomen-soft, nontender, pelvic drain with very little output    Abcess Drain Locking All Purpose Drain (APDL) 8 Fr. LLQ;Posterior (Active)       Urethral Catheter Non-latex 16 Fr. (Active)       Wound 10/15/18 Puncture (exit tube/line site, stabbing, lap site, etc) Buttocks Left (Active)       Wound 10/15/18 Puncture (exit tube/line site, stabbing, lap site, etc) Buttocks Right (Active)       Assessment/Plan: Norma Paul continues to do well. She is healing and overall progressing well.  Repeat sinogram scheduled for Thursday.  We discussed management of the diverticulitis in both the short and long-term.  With any luck, she will be able to have her drain removed this Thursday with cessation of antibiotics following that.  We did discuss the possibility of the fistula not healing down, and with that would portend for long-term management of her diverticulitis.    Barring development of further complications, we would plan on surgery to remove her sigmoid colon in 4 months.  She is due for a colonoscopy around that time and will perform that prior to her surgery.  I will follow-up with her after Thursdays sinogram, to discuss next steps.    Shukri Matute, " MD  [phone]  [facility]

## 2021-06-21 NOTE — H&P
[facility] Radiology History and Physical Note    Procedure Requested: Abscessogram   Requesting Provider: Riya Adames NP    HPI: Norma Paul is a 73 y.o. old female PMH breast cancer and uterine prolapse, hospitalized 10/8/18 - 10/14/18 for acute urinary retention, JOE, abdominal distention and constipation; Found to have diverticulitis with perforation and pelvic abscesses s/p transgluteal drain placement x 2 (8 Fr on left, 10 Fr on right) on 10/10/18.  Currently has only the left transgluteal drain in place.  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  Here today for follow up abscessogram. Denies fevers, pain or leaking at drain     DRAIN OP: < 5ml/day  Does not flush drain as instructed    IMAGING:   Last abscessogram 10-25-18  FINDINGS:  Aspiration from the existing abscess catheter yielded no significant drainage. Abscessogram by a contrast injection through the catheter demonstrated no pericatheter abscess cavity although a fistulous communication with the colon was again demonstrated.   This was confirmed in multiple obliquities.      IMPRESSION:   Persistent fistulous communication to the colon. The patient was again instructed not to flush the catheter. The patient will return in one week's time for repeat abscessogram with possible catheter removal if the fistula is no longer evident.  NPO Status: [place]  Anticoagulation/Antiplatelets/Bleeding tendencies: ASA  Antibiotics:NONE for IR    REVIEW OF SYMPTOMS: as above in HPI otherwise remainder 10 point ROS negative     PAST MEDICAL HISTORY:   Past Medical History:   Diagnosis Date     Breast cancer (H) 2016     Hx of radiation therapy      Hyperlipidemia        PAST SURGICAL HISTORY:  Past Surgical History:   Procedure Laterality Date     BREAST BIOPSY Right 2016     BREAST LUMPECTOMY Right 10/27/2016    DR. PEARSON      SECTION       HYSTERECTOMY         ALLERGIES:  Penicillins; Erythromycin; Neomycin; Statins-hmg-coa reductase inhibitors; Amoxicillin; and Sulfa  "(sulfonamide antibiotics)    MEDICATIONS:  Current Outpatient Prescriptions   Medication Sig Dispense Refill     acetaminophen (TYLENOL) 500 MG tablet Take 1-2 tablets (500-1,000 mg total) by mouth every 4 (four) hours as needed.  0     anastrozole (ARIMIDEX) 1 mg tablet TAKE 1 TABLET EVERY DAY 90 tablet 1     aspirin 81 MG EC tablet Take 160 mg by mouth every evening.        clindamycin (CLEOCIN) 150 MG capsule Take 3 capsules (450 mg total) by mouth 3 (three) times a day for 14 days. 126 capsule 1     levoFLOXacin (LEVAQUIN) 500 MG tablet Take 1 tablet (500 mg total) by mouth Daily at 6:00 am for 14 days. 14 tablet 1     sodium chloride (NS) injection Irrigate with 10 mL as directed daily. 14 Syringe 1     traMADol (ULTRAM) 50 mg tablet Take 1 tablet (50 mg total) by mouth every 6 (six) hours as needed. 8 tablet 0     senna-docusate (PERICOLACE) 8.6-50 mg tablet Take 1 tablet by mouth 2 (two) times a day.  0     No current facility-administered medications for this encounter.        LABS:  INR (no units)   Date Value   10/10/2018 1.07     Hemoglobin (g/dL)   Date Value   10/14/2018 11.5 (L)     Platelets (thou/uL)   Date Value   10/14/2018 330     Creatinine (mg/dL)   Date Value   10/19/2018 0.73     Potassium (mmol/L)   Date Value   10/19/2018 4.5       EXAM:  /75  Pulse (!) 102  Temp 98.7  F (37.1  C) (Oral)   Resp 16  Ht 5' 4\" (1.626 m)  Wt 157 lb (71.2 kg)  SpO2 97%  BMI 26.95 kg/m2  General: Stable. In no acute distress.  Neuro: A&O x 3. Moves all extremities equally.  Resp: Lungs clear to auscultation bilaterally.  Cardio: S1S2 and reg, without murmur, clicks or rubs  Abdomen: Soft, non-distended, non-tender, positive bowel sounds.  Drain-CDI to gravity drainage with no OP in bag   Pre-Sedation Assessment:  Mallampati Airway Classification: Class 2: upper half of tonsil fossa visible  Previous reaction to anesthesia/sedation: no  Sedation plan based on assessment: Moderate  Sleep Apnea: " no  Dentures: no  COPD: no  ASA Classification: ASA 3 - Patient with moderate systemic disease with functional limitations  Comments: no hx of asthma      ASSESSMENT: 73 y.o. old female with recent diverticulitis with perforation and pelvic abscesses s/p transgluteal drain placement x 2 (8 Fr on left, 10 Fr on right) on 10/10/18.  Currently has only the left transgluteal drain in place.  Previous abscessogram has shown a fistula to the colon.Low drain OP    PLAN:   Labs, medications, imaging, nutritional status, clinical information and history obtained and reviewed. Patient appropriate to proceed with abscessogram .     Abscessogram with possible drain reposition/exchange/removal  The procedure, risks and moderate sedation as needed were discussed with patient, all questions answered and patient agrees to proceed with the procedure.  Written consent obtained.     10 minutes were spent with patient during today's visit with greater than >50% of the time spent on counseling and coordinating patient's care.       Gaby Whitley  Interventional Radiology  [phone]         E/M codes for reference only:  22718  Modifier 25

## 2021-06-21 NOTE — PROGRESS NOTES
"Assessment:      Impression: Pelvic abscess secondary to ruptured diverticular abscess.  Doing well on levofloxacin and clindamycin.  Still has drain in place to be reevaluated this week.      Plan:     No orders of the defined types were placed in this encounter.    I refilled her levofloxacin and clindamycin, the last dose that she took yesterday.  Advised her to take them until 1 week after the drain is removed.    Follow-up as needed.   Follow up as needed for acute illness.     Subjective:      This is an follow-up infectious Disease visit for Norma ENEDINA Saenzbob, who is a 73 y.o.  referred for evaluation of pelvic abscess.     Patient is a pleasant 73-year-old woman I met at [facility] a few weeks ago when she was admitted with dysuria but negative urine cultures.  Eventually, CT of the abdomen showed that she actually had pelvic abscesses from a perforated diverticulitis.  Drains x2 were placed and patient was eventually discharged on oral levofloxacin and clindamycin.  She has been doing quite well at home.  1 of the drains has been removed, the other will be reassessed this week.    She still has a Forman catheter in place under the advice of Rosalie Britton of [place] [facility].    She is tolerated the oral antibiotics well.  No diarrhea or itching.  She did have some moniliaSIS under her breast which was treated topically.    She has no other complaints.    The following portions of the patient's history were reviewed and updated as appropriate: allergies, current medications, past family history, past medical history, past social history, past surgical history and problem list.      Review of Systems  Performed and all negative except as mentioned above.      Objective:      /70  Temp 98.3  F (36.8  C)  Ht 5' 4\" (1.626 m)  Wt 158 lb 1.6 oz (71.7 kg)  BMI 27.14 kg/m2  General:   alert, appears stated age and cooperative   Oropharynx:  lips, mucosa, and tongue normal; teeth and gums normal    " Eyes:   Extraocular muscles intact, no icterus.    Ears:   Deferred   Neck:  no adenopathy and supple, symmetrical, trachea midline   Thyroid:   Deferred   Lung:  clear to auscultation bilaterally   Heart:   regular rate and rhythm, S1, S2 normal, no murmur, click, rub or gallop   Abdomen:  Nontender, drain still in place   Extremities:  extremities normal, atraumatic, no cyanosis or edema   Skin:  warm and dry, no hyperpigmentation, vitiligo, or suspicious lesions   CVA:   Deferred   Genitourinary:  Forman catheter still in place     Neurological:   Grossly normal   Psychiatric:   normal mood, behavior, speech, dress, and thought processes         Jaret Mo MD

## 2021-06-21 NOTE — PROCEDURES
Interventional Radiology Post-Procedure Note   [facility]  ?   Brief Procedure Note:   Patient name: Norma Paul  Pt MRN:[id number]   Date of procedure: 10/18/2018     Procedure(s): Abscessogram with removal of right gluteal drain  Sedation method: Moderate sedation was employed. The patient was monitored by an interventional radiology nurse at all times throughout the procedure under my direct guidance.  Pre Procedure Diagnosis: Abscess, diverticulitis  Post Procedure Diagnosis: Same, colonic fistula  Indications: Follow up drainage catheters for known pelvic abscess   ?   Attending: Tre Tovar M.D.  Specimen(s) removed: None   Additional studies ordered: None  Drains: 10 fr left gluteal drain  Estimated Blood Loss: Minimal  Complications: None  Vascular closure method: N/A    Findings/Notes/Comments: No significant residual abscesses and significantly decreased output. Right gluteal drain removed. Left gluteal drain demonstrating moderate fistula to adjacent sigmoid colon. Will transition to gravity bag drainage and stop flushing. Return in 1 week for follow up.   ?   Please see dictation in PACS or under the Imaging tab in [facility] for detailed procedure note.     Tre Tovar M.D.   Vascular and Interventional Radiology   Pager: [phone]   After Hours / Scheduling: [phone]     10/18/2018  3:52 PM

## 2021-06-21 NOTE — PROGRESS NOTES
[facility] Radiology Pre-Procedure Note (see H&P per Dr. Aguillon on 10/8/18)  Date/Time: 10/18/2018/3:08 PM    Requested Procedure:  Abscessogram   Requested Provider:  Dr. Rose    HPI: Norma Paul is a 73 y.o. female with hx of breast cancer and uterine prolapse, hospitalized 10/8/18 - 10/14/18 for acute urinary retention, JOE, abdominal distention and constipation; Found to have diverticulitis with perforation and pelvic abscesses s/p transgluteal drain placement x 2 (8 Fr on left, 10 Fr on right) on 10/10/18.    Pt's  has been flushing drains with 10 ml saline daily. Pt c/o discomfort at drain exit sites. Denies fevers, chills.     Drain Outputs (accounting for flushes):             10/18/18    10/17/18    10/16/18    10/15/18  Right   0+ ml         8 ml           0 ml           30 ml  Left     0+ ml          8 ml          0 ml            10 ml    IMAGING:    CT ABDOMEN PELVIS WO ORAL W IV CONTRAST  10/9/2018 2:48 PM  IMPRESSION:   CONCLUSION:  1.  Decompressed bladder.  2.  Resolution hydronephrosis.  3.  Four persistent pelvic fluid collections 2 of which contain air and could represent abscesses. These could be from a ruptured sigmoid diverticulum.  4.  Cholelithiasis.  5.  Linear left renal nonobstructive stones or parenchymal calcifications.    NPO status:  Since [place]   Anticoagulation/Antiplatelets/Bleeding tendencies:  ASA 81 mg  Antibiotics:  Levaquin po, Cleocin po    PAST MEDICAL HISTORY:      Past Medical History:   Diagnosis Date     Breast cancer (H) 2016     Hx of radiation therapy        PAST SURGICAL HISTORY:  Past Surgical History:   Procedure Laterality Date     BREAST BIOPSY Right 2016     BREAST LUMPECTOMY Right 10/27/2016    DR. PEARSON      SECTION       HYSTERECTOMY         ALLERGIES:  Penicillins; Erythromycin; Neomycin; Statins-hmg-coa reductase inhibitors; Amoxicillin; and Sulfa (sulfonamide antibiotics)    MEDICATIONS:  Current Outpatient Prescriptions   Medication Sig  "Dispense Refill     acetaminophen (TYLENOL) 500 MG tablet Take 1-2 tablets (500-1,000 mg total) by mouth every 4 (four) hours as needed.  0     anastrozole (ARIMIDEX) 1 mg tablet TAKE 1 TABLET EVERY DAY 90 tablet 1     aspirin 81 MG EC tablet Take 162 mg by mouth every evening.        clindamycin (CLEOCIN) 150 MG capsule Take 3 capsules (450 mg total) by mouth 3 (three) times a day for 14 days. 126 capsule 1     levoFLOXacin (LEVAQUIN) 500 MG tablet Take 1 tablet (500 mg total) by mouth Daily at 6:00 am for 14 days. 14 tablet 1     senna-docusate (PERICOLACE) 8.6-50 mg tablet Take 1 tablet by mouth 2 (two) times a day.  0     traMADol (ULTRAM) 50 mg tablet Take 1 tablet (50 mg total) by mouth every 6 (six) hours as needed. 8 tablet 0     miconazole (MICOTIN) 2 % powder Apply to skin folds three times a day for intertrigo  0     sodium chloride (NS) injection Irrigate with 10 mL as directed daily. 14 Syringe 1     Current Facility-Administered Medications   Medication Dose Route Frequency Provider Last Rate Last Dose     sodium chloride bacteriostatic 0.9 % injection 0.1-0.3 mL  0.1-0.3 mL Subcutaneous PRN Joce Murillo CNP         sodium chloride flush 3 mL (NS)  3 mL Intravenous Line Care Joce Murillo CNP           LABS:    Lab Results   Component Value Date    WBC 6.8 10/14/2018    HGB 11.5 (L) 10/14/2018    HCT 33.9 (L) 10/14/2018    MCV 88 10/14/2018     10/14/2018     Lab Results   Component Value Date    CREATININE 0.50 (L) 10/13/2018       EXAM:  /71  Pulse 82  Temp 98.6  F (37  C) (Oral)   Resp 16  Ht 5' 4\" (1.626 m)  Wt 160 lb (72.6 kg)  SpO2 100%  BMI 27.46 kg/m2  General:  Stable.  In no acute distress.  Neuro:  A&O x 3.  Moves all extremities equally.  Resp:  Lungs clear to auscultation bilaterally.  Cardio:  S1S2 and reg, without murmur, clicks or rubs.  Glute: R&L gluteal drain exit sites without erythema, edema. Drains remain to SANDRA bulb suction with only gtts in each " bulb.     Pre-Sedation Assessment:  Mallampati Airway Classification: Class 2: upper half of tonsil fossa visible  Previous reaction to anesthesia/sedation: no  Sedation plan based on assessment: Moderate as needed  Sleep Apnea: no  Dentures: no  COPD: no  ASA Classification: ASA 3 - Patient with moderate systemic disease with functional limitations  Comments: No hx of asthma    ASSESSMENT:  73 yr old female with pelvic abscesses s/p transgluteal drains x 2 10/10/18    PLAN:  Abscessogram with possible drain reposition, exchange or removal - with sedation as needed.     The procedure, risks and moderate sedation were discussed with patient, all questions answered and patient agrees to proceed with the procedure.   Written consent obtained.    Joce Murillo, APRN, CNP

## 2021-06-21 NOTE — PROGRESS NOTES
[facility] Hematology and Oncology Progress Note    Patient: Norma Paul  MRN: [id number]  Date of Service: 10/16/2018        Reason for Visit    Chief Complaint   Patient presents with     HE Cancer     Breast Cancer       Assessment and Plan  Malignant neoplasm of female breast, unspecified estrogen receptor status, unspecified laterality, unspecified site of breast (H)    Staging form: Breast, AJCC 7th Edition    - Clinical stage from 11/8/2016: Stage IA (T1c, N0, M0) - Unsigned          ER Status: Positive          AK Status: Negative          HER2 Status: Negative    1. Breast cancer: stage IA. Surgery on 10/27/18. Oncotype 12. She is taking anastrozole and tolerating well. Shew ill return in 6 months. She is due for mammogram now and will get that. Pt and  thought she needed diagnostic or 3D, but her tumor was picked up on screening mammogram so that should be enough going forward.     2. Yeast infection under breast: ok to use miconazole powder or just baby powder.       ECOG Performance   ECOG Performance Status: 1     Distress Assessment  Distress Assessment Score: No distress    Pain  Currently in Pain: No/denies      Problem List    1. Malignant neoplasm of female breast, unspecified estrogen receptor status, unspecified laterality, unspecified site of breast (H)          ______________________________________________________________________________    History of Present Illness    Measurable disease: None postoperatively     Current therapy: Anastrozole 1 mg by mouth daily started January 1, 2017     Treatment history: Left lumpectomy and then adjuvant radiation therapy, 16 fractions completed December 2016    Interim History: pt is here today for follow up visit. She is doing well except was recently hospitalized with UTI, urinary retention and pelvis abscesses. She still has drains in. Other than that, she is feeling fine. She does have some redness/irritation under right breast. Has questions  about mammograms and the different kinds. Mild fatigue.     Pain Status  Currently in Pain: No/denies    Review of Systems    Constitutional  Constitutional (WDL): Exceptions to WDL  Fatigue: Fatigue not relieved by rest - Limiting instrumental ADL  Neurosensory  Neurosensory (WDL): Exceptions to WDL  Ataxia: Asymptomatic, clinical or diagnostic observations only, intervention not indicated  Eye   Eye Disorder (WDL): All eye disorder elements are within defined limits  Ear  Ear Disorder (WDL): All ear disorder elements are within defined limits  Cardiovascular  Cardiovascular (WDL): All cardiovascular elements are within defined limits  Pulmonary  Respiratory (WDL): Within Defined Limits  Gastrointestinal  Gastrointestinal (WDL): Exceptions to WDL  Anorexia: Loss of appetite without alteration in eating habits  Constipation: Occasional or intermittent symptoms, occasional use of stool softeners, laxatives, dietary modification, or enema  Genitourinary  Genitourinary (WDL): Exceptions to WDL (Has a catheter in place currently. )  Lymphatic  Lymph (WDL): All lymph disorder elements are within defined limits  Musculoskeletal and Connective Tissue  Musculoskeletal and Connetive Tissue Disorders (WDL): All Musculoskeletal and Connetive Tissue Disorder elements are within defined limits  Integumentary  Integumentary (WDL): Exceptions to WDL (SANDRA tubes intact.)  Patient Coping  Patient Coping: Accepting  Distress Assessment  Distress Assessment Score: No distress  Accompanied by  Accompanied by: Family Member  Oral Chemo Adherence       Past History  Past Medical History:   Diagnosis Date     Breast cancer (H) 2016     Hx of radiation therapy        PHYSICAL EXAM:  /62  Pulse 86  Temp 98.2  F (36.8  C) (Oral)   Wt 166 lb (75.3 kg)  SpO2 98%  BMI 28.49 kg/m2  GENERAL: no acute distress. Cooperative in conversation. Here with   HEENT: pupils are equal, round and reactive. Oromucosa is clean and intact. No  ulcerations or mucositis noted. No bleeding noted.  RESP: lungs are clear bilaterally per auscultation. Regular respiratory rate. No wheezes or rhonchi.  CV: Regular, rate and rhythm. No murmurs.  ABD: soft, nontender. Positive bowel sounds. No organomegaly.   MUSCULOSKELETAL: No lower extremity swelling.   NEURO: non focal. Alert and oriented x3.   PSYCH: within normal limits. No depression or anxiety.  SKIN: warm dry intact   LYMPH: no cervical, supraclavicular or axillary lymphadenopathy  BREAST: did not do full exam since she is getting mammogram today, but did look at skin and it is irritated and red from moisture.         Lab Results    No results found for this or any previous visit (from the past 24 hour(s)).      Imaging    Ct Abdomen Pelvis Without Oral Without Iv Contrast    Result Date: 10/8/2018  CT ABDOMEN PELVIS WO ORAL WO IV CONTRAST 10/8/2018 12:13 PM INDICATION: Diffuse abdominal pain with urinary retention and constipation diffuse abdominal pain with urinary retention and constipation TECHNIQUE: Routine CT abdomen and pelvis without oral or IV contrast. Multiplanar reformation images (MPR). Dose reduction techniques were used. COMPARISON: None. FINDINGS: LUNG BASES: Trace pericardial effusion. Moderate-sized sliding hiatal hernia. ABDOMEN: Small amount free fluid around the liver and spleen. Significant bilateral hydronephrosis which is most likely due to urinary retention. Left upper renal 9 x 2 mm, and inferior renal 9 x 2 mm nonobstructive stones. Right inferior renal 1 mm possible nonobstructive stone. Small pancreas. Calcified gallstone. Right para-aortic 11 x 11 mm enlarged node (series 4, image 128) Unenhanced adrenals and spleen appear normal. PELVIS: Distended bladder consistent with urinary retention. Posterior to the bladder is a an 8.7 x 3.5 cm fluid collection. Adjacent the right upper bladder dome is a 6 x 3.6 cm fluid collection. These fluid collections could arise from bladder  diverticuli or could be from recent surgical procedure, infectious or inflammatory process. Sigmoid diverticulosis. Normal amount stool in colon. No evidence bowel obstruction. There is a small linear collection of air in what is likely the posterior fluid collection. There is soft tissue thickening within the perineum and pleural pelvic floor dehiscence. MUSCULOSKELETAL: Lumbar spinal degenerative change.     CONCLUSION: 1.  Urinary retention with hydronephrosis. 2.  Two pelvic fluid collections adjacent the bladder. The posterior fluid collection may contain air. Recommend repeat imaging, preferably with contrast following bladder decompression. 3.  Pelvic floor dehiscence. 4.  Right para-aortic adenopathy. This could be reactive or neoplastic. 5.  Cholelithiasis. 6.  Left renal nonobstructive stones. 7.  Small amount ascites. 8.  Moderate-sized hiatal hernia.    Ct Abdomen Pelvis Without Oral With Iv Contrast    Result Date: 10/9/2018  CT ABDOMEN PELVIS WO ORAL W IV CONTRAST 10/9/2018 2:48 PM     INDICATION: Abdominal pain evaluate pelvic fluid collections adjacent the bladder TECHNIQUE: CT abdomen and pelvis. Multiplanar reformation images (MPR). Dose reduction techniques were used. Delayed images were obtained. IV CONTRAST: Iohexol (Omni) 100 mL COMPARISON: 10/08/2018 CT FINDINGS: LUNG BASES: Trace bilateral pleural effusions. Passive atelectasis. ABDOMEN: Resolved hydronephrosis. Decreased free fluid in the upper abdomen. Cholelithiasis. Linear left renal nonobstructive stones or parenchymal calcifications.. Subcentimeter right renal cyst. Small pancreas. Normal-appearing adrenals, spleen, and liver. PELVIS: The bladder is decompressed with Forman catheter. There are 4 persistent pelvic fluid collections. The right measures 5.1 x 4.1 cm and does not contain air. The anterior midline collection does not contain air measuring 7.9 x 2.6 cm. The lower central pelvic collection measures 8.4 x 6.8 cm and contains   air. A more cephalad deep central/left fluid collection measures 7.9 x 2.3 cm. None of these fluid collections fill with contrast on the delayed images of the pelvis. There is extensive sigmoid diverticulosis. It is possible these fluid collections are due to a ruptured diverticulum. No definite free air is seen. Small bowel appears normal. The appendix appears fluid-filled. MUSCULOSKELETAL: Mild spinal degenerative change.     CONCLUSION: 1.  Decompressed bladder. 2.  Resolution hydronephrosis. 3.  Four persistent pelvic fluid collections 2 of which contain air and could represent abscesses. These could be from a ruptured sigmoid diverticulum. 4.  Cholelithiasis. 5.  Linear left renal nonobstructive stones or parenchymal calcifications.    Ct Abscess Drain Abdomen    Result Date: 10/15/2018  1. PERCUTANEOUS DRAIN PLACEMENT pelvic abscess 2. CT GUIDANCE 3. CONSCIOUS SEDATION 10/10/2018 5:13 PM Associated this exam with CT report with accession number [id number]    Ct Abscess Drain Pelvic    Result Date: 10/10/2018  1. PERCUTANEOUS DRAIN PLACEMENT X2, INTO PELVIC CUL-DE-SAC ABSCESS AND LEFT PELVIC SIDEWALL ABSCESS 2. CT GUIDANCE 3. CONSCIOUS SEDATION 10/10/2018 5:12 PM INDICATION: Multiple pelvic fluid collections, unclear source TECHNIQUE: Dose reduction techniques were used. PROCEDURE: Informed consent obtained. Time out performed. The site was prepped and draped in sterile fashion. 10 mL of 1% lidocaine was infused into the local soft tissues. Using standard technique and under direct CT guidance, a 10 Arabic drainage catheter was inserted into the pelvic cul-de-sac fluid collection. An 8 Arabic drainage catheter was inserted into the smaller left pelvic fluid collection. SPECIMEN: 200 mL of purulent fluid was aspirated from the pelvic cul-de-sac. 20 mL of bloody, purulent fluid was aspirated from the left pelvic collection. Samples sent to lab for cultures and Gram stain. BLOOD LOSS: Minimal. The patient tolerated  the procedure well. No immediate complications. RADIOLOGIC SUPERVISION AND INTERPRETATION: CT GUIDANCE: Images demonstrate the needle and subsequent catheters to be in good position. SEDATION: Versed 2 mg. Fentanyl 125 mcg. The procedure was performed with administration intravenous conscious sedation with appropriate preoperative, intraoperative, and postoperative evaluation. 60 minutes of supervised face to face conscious sedation time was provided by a radiology nurse under my direct supervision.     CONCLUSION: 1.  Successful CT-guided drain placement into 2 pelvic abscesses. Reference CPT Codes: 27222, 50771, 99153 x 3        Signed by: Ammy Wilkerson, CNP

## 2021-06-21 NOTE — H&P
H&P documented within 30 days. I have performed and assessment and examined the patient, as necessary, to update the patient's current status that may have changed since the prior History and Physical.       Mikki Hirsch MD, [facility]  Vascular and Interventional Radiology  Pager: [phone]  Clinic: [phone]

## 2021-06-21 NOTE — PROGRESS NOTES
Pt arrives from CT to IR ambulatory with S.O.  Pt has record of drain output, both drains 10-15cc daily since placement.

## 2021-06-21 NOTE — PROGRESS NOTES
H&P documented within 30 days (by Joce Murillo on 10/18/18). I have performed and assessment and examined the patient, as necessary, to update the patient's current status that may have changed since the prior History and Physical.

## 2021-06-21 NOTE — H&P
[facility] Radiology History and Physical Note    Procedure Requested: Abscessogram   Requesting Provider: Joce Murillo CNP    HPI: Norma Paul is a 73 y.o. old female with hx of breast cancer and uterine prolapse, hospitalized 10/8/18 - 10/14/18 for acute urinary retention, JOE, abdominal distention and constipation; Found to have diverticulitis with perforation and pelvic abscesses s/p transgluteal drain placement x 2 (8 Fr on left, 10 Fr on right) on 10/10/18.  Currently has only the left transgluteal drain in place.  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  Here today for follow up abscessogram.      Patient reports feeling well.  No new abdominal/pelvic pain.  No fevers.  Appetite slowly improving.      Flushing:  None  Outputs:  <5 cc daily    IMAGING:   IR Abscessogram 10/18/18:    FINDINGS:  Aspiration from the existing left gluteal abscess catheter yielded minimal aspirate. Contrast injection through this catheter demonstrated no significant residual fluid collection with fistulous communication with the adjacent sigmoid colon.  Aspiration from the existing right gluteal abscess catheter no significant aspirate. Abscessogram by a contrast injection through the catheter demonstrated a small residual fluid collection and no evidence of fistula. This was confirmed in multiple   obliquities.  IMPRESSION:   1. Abscessogram through the left gluteal drainage catheter demonstrates no significant residual fluid collection with fistulous claudication with the colon. This drainage catheter remained in place and was attached to a gravity bag for drainage rather   than a suction bulb. Will stop flushing the catheter in order to help spontaneous healing of the fistula.  2. Abscessogram through the right gluteal drainage catheter demonstrates a small residual fluid collection without evidence of fistula. Due to consecutive days of low outputs through this catheter, the drainage catheter was  removed.     PLAN: Return to interventional radiology in one week for follow-up abscessogram.    NPO Status: Midnight   Anticoagulation/Antiplatelets/Bleeding tendencies: Aspirin   Antibiotics: None as it pertains to this procedure       PAST MEDICAL HISTORY:   Past Medical History:   Diagnosis Date     Breast cancer (H) 2016     Hx of radiation therapy        PAST SURGICAL HISTORY:  Past Surgical History:   Procedure Laterality Date     BREAST BIOPSY Right 2016     BREAST LUMPECTOMY Right 10/27/2016    DR. PEARSON      SECTION       HYSTERECTOMY         ALLERGIES:  Penicillins; Erythromycin; Neomycin; Statins-hmg-coa reductase inhibitors; Amoxicillin; and Sulfa (sulfonamide antibiotics)    MEDICATIONS:  Current Outpatient Prescriptions   Medication Sig Dispense Refill     acetaminophen (TYLENOL) 500 MG tablet Take 1-2 tablets (500-1,000 mg total) by mouth every 4 (four) hours as needed.  0     anastrozole (ARIMIDEX) 1 mg tablet TAKE 1 TABLET EVERY DAY 90 tablet 1     aspirin 81 MG EC tablet Take 160 mg by mouth every evening.        clindamycin (CLEOCIN) 150 MG capsule Take 3 capsules (450 mg total) by mouth 3 (three) times a day for 14 days. 126 capsule 1     levoFLOXacin (LEVAQUIN) 500 MG tablet Take 1 tablet (500 mg total) by mouth Daily at 6:00 am for 14 days. 14 tablet 1     senna-docusate (PERICOLACE) 8.6-50 mg tablet Take 1 tablet by mouth 2 (two) times a day.  0     sodium chloride (NS) injection Irrigate with 10 mL as directed daily. 14 Syringe 1     traMADol (ULTRAM) 50 mg tablet Take 1 tablet (50 mg total) by mouth every 6 (six) hours as needed. 8 tablet 0     Current Facility-Administered Medications   Medication Dose Route Frequency Provider Last Rate Last Dose     sodium chloride bacteriostatic 0.9 % injection 0.1-0.3 mL  0.1-0.3 mL Subcutaneous PRN Riya Adames NP         sodium chloride flush 3 mL (NS)  3 mL Intravenous Line Care Riya Adames NP           LABS:  INR (no units)   Date  "Value   10/10/2018 1.07     Hemoglobin (g/dL)   Date Value   10/14/2018 11.5 (L)     Platelets (thou/uL)   Date Value   10/14/2018 330       EXAM:  /71  Pulse 99  Temp 98.7  F (37.1  C) (Oral)   Resp 18  Ht 5' 4\" (1.626 m)  Wt 156 lb (70.8 kg)  SpO2 98%  BMI 26.78 kg/m2  General: Stable. In no acute distress.  Neuro: A&O x 3. Moves all extremities equally.  Resp: Lungs clear to auscultation bilaterally.  Cardio: S1S2 and reg, without murmur, clicks or rubs  Abdomen: Soft, non-distended, non-tender.  Left transgluteal drain to gravity bag with small tan outputs.    MSK: No gross motor weakness. Sensation intact.     Pre-Sedation Assessment:  Mallampati Airway Classification: Class 2: upper half of tonsil fossa visible  Previous reaction to anesthesia/sedation: no  Sedation plan based on assessment: Moderate  Sleep Apnea: no  Dentures: no  COPD: no  ASA Classification: ASA 3 - Patient with moderate systemic disease with functional limitations  Comments: no hx of asthma     ASSESSMENT: 73 y.o. old female with recent diverticulitis with perforation and pelvic abscesses s/p transgluteal drain placement x 2 (8 Fr on left, 10 Fr on right) on 10/10/18.  Currently has only the left transgluteal drain in place.  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  Here today for follow up abscessogram.      Flushing:  None  Outputs:  <5 cc daily      PLAN: Abscessogram with possible drain reposition, exchange or removal with sedation.      The procedure, risks and moderate sedation were discussed with the patient, all questions answered and patient agrees to proceed with the procedure. Written consent obtained.    [facility]: Interventional Radiology   [phone]     E&M Code:  81957, modifier 25  "

## 2021-06-21 NOTE — PROGRESS NOTES
" HPI: Nomra Paul is here for follow up after discharge from the hospital for perforated diverticulitis with complication of an abscess.  She status post drain placement while in the hospital.  Recently, 1 of the 2 drains was removed.  She still has purulent output from the remaining drain but feels otherwise well.  No abdominal complaints, no fevers or chills or any other issues.  Allergies, Medications, Social History, Past Medical History and Past Surgical History were reviewed and are noted in the chart.    /79 (Patient Site: Left Arm, Patient Position: Sitting, Cuff Size: Adult Large)  Pulse (!) 103  Ht 5' 4\" (1.626 m)  Wt 156 lb (70.8 kg)  SpO2 98%  Breastfeeding? No  BMI 26.78 kg/m2  Body mass index is 26.78 kg/(m^2).      EXAM:   GENERAL: Appears well  Abdomen-soft, nontender, pelvic drain with purulent output, scant    Abcess Drain Locking All Purpose Drain (APDL) 8 Fr. LLQ;Posterior (Active)       Urethral Catheter Non-latex 16 Fr. (Active)       Wound 10/15/18 Puncture (exit tube/line site, stabbing, lap site, etc) Buttocks Left (Active)       Wound 10/15/18 Puncture (exit tube/line site, stabbing, lap site, etc) Buttocks Right (Active)       Assessment/Plan: Norma Paul continues to do well. She wound is healing and overall progressing well.  At this point she has plans for a CT scan at the end of the week for possible drain removal.  Once this is complete I have recommended that she follow-up with Dr. Matute who originally saw her in the hospital for ongoing discussion regarding colonoscopy as well as future surgery.  She understands everything that was discussed and will follow up accordingly.    Dorian Gallego  DO [facility] Department of Surgery  "

## 2021-06-23 NOTE — OP NOTE
COLONOSCOPY REPORT      Pre-op Dx:              Screening colonoscopy      Procedure:             Colonoscopy      Indications:            Colon Cancer Screening      Findings:                Normal colonoscopy    Procedure:              The patient is brought to the endoscopy suite placed in a lateral position and the patient is given conscious sedation anesthesia.  Prep quality was adequate, with a moderate amount of turbid fluid still in the colon.  The colonoscope was advanced atraumatically into the anus taken all way up and around to the cecum.  The ileocecal valve and the appendiceal orifice are clearly identified.  The scope was slowly drawn back irrigating and aspirating all fluid in the colon to ensure adequate visualization of mucosa.  No lesions seen in the cecum or the ascending colon no lesions seen in the transverse colon no lesions in the descending or sigmoid colon.  There was significant burden of diverticulosis beginning at 40 cm from the anus, most densely located in the proximal sigmoid colon.  The scope was drawn back into the rectum and retroflexed I do not see evidence for any significant hemorrhoidal disease.  The colonoscope was straightened and taken up to the splenic flexure areas to aspirate insufflated air from the left side of the colon as the scope was withdrawn.    EBL:                        None      Medications:         MAC; see anesthesia note for details          Complications:      None      Post-op Dx:            Diverticulosis      Recommendation:   Okay to proceed with plan for sigmoid resection due to previous diverticulitis with fistula      Shukri Matute  1/30/2019 9:08 AM  [facility]  [phone]

## 2021-06-23 NOTE — ANESTHESIA POSTPROCEDURE EVALUATION
Patient: Norma Paul  COLECTOMY, SIGMOID, LAPAROSCOPIC  Anesthesia type: general    Patient location: PACU  Last vitals:   Vitals:    02/11/19 1120   BP: 144/76   Pulse: 65   Resp: 9   Temp:    SpO2: 100%     Post vital signs: stable  Level of consciousness: awake and responds to simple questions  Post-anesthesia pain: pain controlled  Post-anesthesia nausea and vomiting: no  Pulmonary: unassisted, nasal cannula  Cardiovascular: stable and blood pressure at baseline  Hydration: adequate  Anesthetic events: no    QCDR Measures:  ASA# 11 - Niesha-op Cardiac Arrest: ASA11B - Patient did NOT experience unanticipated cardiac arrest  ASA# 12 - Niesha-op Mortality Rate: ASA12B - Patient did NOT die  ASA# 13 - PACU Re-Intubation Rate: ASA13B - Patient did NOT require a new airway mgmt  ASA# 10 - Composite Anes Safety: ASA10A - No serious adverse event    Additional Notes:

## 2021-06-23 NOTE — H&P
Admission History & Physical  Norma Paul, 1945, [id number]    [facility]  Mark Astudillo MD, [phone]   Code Status:  Full code     Extended Emergency Contact Information  Primary Emergency Contact: Jony Paul  Address: [address] [place] [address]  Home Phone: [phone]  Relation: Spouse  Secondary Emergency Contact: Kaitlin Medina   [place]  Mobile Phone: [phone]  Relation: Child     Assessment and Plan:   Status post diverticulitis with perforation and fistula; here today for colonoscopy prior to surgical intervention.    Plan for colonoscopy under propofol sedation today.    Active Problems:    * No active hospital problems. *      Chief Complaint:  Interval colonoscopy     HPI:    Norma Paul is a 73 y.o. old female known to me from a previous episode of diverticulitis with abscess and fistula formation.  She recovered well from this, and has been 3 months now since her episode.Is returning today for colonoscopy to evaluate for any other pathology prior to planning sigmoid colectomy.    History is provided by the patient    Medical History  Active Ambulatory (Non-Hospital) Problems    Diagnosis     Slow transit constipation     Diverticulitis of colon     Hypomagnesemia     Hypokalemia     Pelvic abscess in female     Urinary retention     Hyponatremia     JOE (acute kidney injury) (H)     Leukocytosis     Urinary tract infection     Uterine prolapse     Hydronephrosis     Sepsis (H)     Dehydration     Renal stone     Malignant neoplasm of female breast, unspecified estrogen receptor status, unspecified laterality, unspecified site of breast (H)     Past Medical History:   Diagnosis Date     Breast cancer (H) 2016     Hx of radiation therapy      Hyperlipidemia      Prolapse, uterovaginal      Patient Active Problem List    Diagnosis Date Noted     Slow transit constipation 10/14/2018     Diverticulitis of  colon      Hypomagnesemia      Hypokalemia      Pelvic abscess in female 10/10/2018     Urinary retention 10/08/2018     Hyponatremia 10/08/2018     JOE (acute kidney injury) (H) 10/08/2018     Leukocytosis 10/08/2018     Urinary tract infection 10/08/2018     Uterine prolapse 10/08/2018     Hydronephrosis 10/08/2018     Sepsis (H) 10/08/2018     Dehydration 10/08/2018     Renal stone 10/08/2018     Malignant neoplasm of female breast, unspecified estrogen receptor status, unspecified laterality, unspecified site of breast (H) 2016     Surgical History  She  has a past surgical history that includes Hysterectomy;  section; Breast biopsy (Right, ); Breast lumpectomy (Right, 10/27/2016); and Abdominal surgery.   Past Surgical History:   Procedure Laterality Date     ABDOMINAL SURGERY       BREAST BIOPSY Right 2016     BREAST LUMPECTOMY Right 10/27/2016    DR. PEARSON      SECTION       HYSTERECTOMY      Social History  Reviewed, and she  reports that  has never smoked. she has never used smokeless tobacco. She reports that she drinks alcohol. She reports that she does not use drugs.  Social History     Tobacco Use     Smoking status: Never Smoker     Smokeless tobacco: Never Used   Substance Use Topics     Alcohol use: Yes     Comment: 5/week      Allergies  Allergies   Allergen Reactions     Penicillins Anaphylaxis     Neomycin Swelling     Statins-Hmg-Coa Reductase Inhibitors Myalgia     Erythromycin      rash     Amoxicillin Rash     Sulfa (Sulfonamide Antibiotics) Rash    Family History  Reviewed, and family history includes Atrial fibrillation in her sister; Breast cancer (age of onset: 73) in her cousin; Cancer in her cousin; Heart disease in her father and mother; Hypertension in her father and mother; Lung cancer (age of onset: 67) in her maternal uncle; No Medical Problems in her daughter and son; Skin cancer (age of onset: 73) in her sister; Thyroid disease in her sister.    "Psychosocial Needs  Social History     Social History Narrative     Not on file     Additional psychosocial needs reviewed per nursing assessment.       Prior to Admission Medications     (Not in a hospital admission)        Review of Systems   All other systems reviewed and are negative.      /79   Pulse (!) 107   Temp 98  F (36.7  C) (Temporal)   Resp 16   Ht 5' 4\" (1.626 m)   Wt 157 lb (71.2 kg)   SpO2 97%   BMI 26.95 kg/m      Physical Exam   Cardiovascular: Normal rate and regular rhythm.   Pulmonary/Chest: Effort normal and breath sounds normal. No respiratory distress.   Abdominal: Soft. She exhibits no distension.       Shukri Matute MD  [phone]  [facility] Surgery        "

## 2021-06-23 NOTE — PROGRESS NOTES
" HPI: Norma Paul is here for follow up regarding complicated diverticulitis with abscess and fistula from October 2018.  Since treatment of that initial issue, she is been doing well with no further episodes of diverticulitis.  Does still have some baseline constipation.  Underwent colonoscopy yesterday which revealed extensive diverticulosis extending up to 40 cm from the anus, but no residual evidence of any fistula and no other abnormalities such as polyps or lesions.      /73 (Patient Site: Left Arm, Patient Position: Sitting, Cuff Size: Adult Large)   Pulse 76   Ht 5' 4\" (1.626 m)   Wt 155 lb (70.3 kg)   SpO2 99%   Breastfeeding? No   BMI 26.61 kg/m    Body mass index is 26.61 kg/m .      EXAM:   GENERAL: Appears well  Abdomen-soft, nondistended  Heart-regular rate and rhythm  Respiratory-normal respiratory effort    Assessment/Plan: Norma Paul continues to do well following her bout with complicated diverticulitis last fall.  Enough time has elapsed that it would now be reasonable to pursue sigmoid colectomy to prevent further episodes of complicated diverticulitis.  We discussed the risks and benefits of pursuing sigmoid colectomy, including risk of bleeding, infection, anastomotic leakage.  We also discussed the potential risks of recurrent diverticulitis going forward.    She would like to proceed with colectomy to avoid further issues in the future.  She is scheduled to have a visit with a urologist regarding some vaginal prolapse issues, which I think would be best addressed after her colectomy as this may improve some of her constipation and therefore some of her prolapse pelvic floor issues.  Would advise that she not have anything done to her pelvic floor or prolapse correction for 6 weeks after surgery.    Shukri Matute MD  [phone]  [facility] Surgery  "

## 2021-06-23 NOTE — ANESTHESIA PREPROCEDURE EVALUATION
Anesthesia Evaluation      Patient summary reviewed   No history of anesthetic complications     Airway   Mallampati: II  Neck ROM: full   Pulmonary - negative ROS and normal exam    breath sounds clear to auscultation                         Cardiovascular - negative ROS  Exercise tolerance: > or = 4 METS  (-) hypertension, hypercholesterolemia  Rhythm: regular  Rate: normal,         Neuro/Psych - negative ROS     Endo/Other       Comments: Hx breast cancer ER +, maintained on Arimidex      GI/Hepatic/Renal - negative ROS   (-) renal disease    Comments: Diverticulosis, s/f lap sigmoid colectomy     Chronic obstructive urinary retention 2/2 uterine prolapse, self cath 2-3x/day     Other findings: Cr 0.77, K+ 3.8, HCT 45.7, Plt 184      Dental - normal exam                          Anesthesia Plan  Planned anesthetic: general endotracheal  GETA  Pre-op tylenol 1g, gabapentin 300mg, oxycodone 5mg  Decadron 10, Zofran 4 for antiemesis   Consented for pre-emergence TAP pending surgeon need and incision size   ASA 2   Induction: intravenous   Anesthetic plan and risks discussed with: patient and spouse  Anesthesia plan special considerations: antiemetics,   Post-op plan: routine recovery

## 2021-06-23 NOTE — ANESTHESIA POSTPROCEDURE EVALUATION
Patient: Norma Paul  COLONOSCOPY  Anesthesia type: MAC    Patient location: Phase II Recovery  Last vitals:   Vitals:    01/30/19 0915   BP: 102/60   Pulse: 80   Resp: 16   Temp:    SpO2: 98%     Post vital signs: stable  Level of consciousness: awake and responds to simple questions  Post-anesthesia pain: pain controlled  Post-anesthesia nausea and vomiting: no  Pulmonary: unassisted, return to baseline  Cardiovascular: stable and blood pressure at baseline  Hydration: adequate  Anesthetic events: no    QCDR Measures:  ASA# 11 - Niesha-op Cardiac Arrest: ASA11B - Patient did NOT experience unanticipated cardiac arrest  ASA# 12 - Niesha-op Mortality Rate: ASA12B - Patient did NOT die  ASA# 13 - PACU Re-Intubation Rate: NA - No ETT / LMA used for case  ASA# 10 - Composite Anes Safety: ASA10A - No serious adverse event    Additional Notes:

## 2021-06-23 NOTE — ANESTHESIA CARE TRANSFER NOTE
Last vitals:   Vitals:    01/30/19 0754   BP: 136/79   Pulse: (!) 107   Resp: 16   Temp: 36.7  C (98  F)   SpO2: 97%     Patient's level of consciousness is drowsy  Spontaneous respirations: yes  Maintains airway independently: yes  Dentition unchanged: yes  Oropharynx: oropharynx clear of all foreign objects    QCDR Measures:  ASA# 20 - Surgical Safety Checklist: WHO surgical safety checklist completed prior to induction    PQRS# 430 - Adult PONV Prevention: 4558F - Pt received => 2 anti-emetic agents (different classes) preop & intraop  ASA# 8 - Peds PONV Prevention: NA - Not pediatric patient, not GA or 2 or more risk factors NOT present  PQRS# 424 - Niesha-op Temp Management: NA - MAC anesthesia or case < 60 minutes  PQRS# 426 - PACU Transfer Protocol: - Transfer of care checklist used  ASA# 14 - Acute Post-op Pain: ASA14B - Patient did NOT experience pain >= 7 out of 10

## 2021-06-23 NOTE — ANESTHESIA CARE TRANSFER NOTE
Last vitals:   Vitals:    02/11/19 1110   BP: 135/68   Pulse: 70   Resp: 18   Temp: 36.3  C (97.4  F)   SpO2: 100%     Patient's level of consciousness is awake and drowsy  Spontaneous respirations: yes  Maintains airway independently: yes  Dentition unchanged: yes  Oropharynx: oropharynx clear of all foreign objects    QCDR Measures:  ASA# 20 - Surgical Safety Checklist: WHO surgical safety checklist completed prior to induction    PQRS# 430 - Adult PONV Prevention: 4558F - Pt received => 2 anti-emetic agents (different classes) preop & intraop  ASA# 8 - Peds PONV Prevention: NA - Not pediatric patient, not GA or 2 or more risk factors NOT present  PQRS# 424 - Niesha-op Temp Management: 4559F - At least one body temp DOCUMENTED => 35.5C or 95.9F within required timeframe  PQRS# 426 - PACU Transfer Protocol: - Transfer of care checklist used  ASA# 14 - Acute Post-op Pain: ASA14B - Patient did NOT experience pain >= 7 out of 10

## 2021-06-23 NOTE — ANESTHESIA PREPROCEDURE EVALUATION
Anesthesia Evaluation      Patient summary reviewed   No history of anesthetic complications     Airway   Mallampati: II  Neck ROM: full   Pulmonary - negative ROS and normal exam    breath sounds clear to auscultation                         Cardiovascular - negative ROS  Rhythm: regular  Rate: normal,         Neuro/Psych - negative ROS     Endo/Other       Comments: Hx breast cancer    GI/Hepatic/Renal - negative ROS           Dental - normal exam                        Anesthesia Plan  Planned anesthetic: MAC    ASA 2     Anesthetic plan and risks discussed with: patient and spouse    Post-op plan: routine recovery

## 2021-06-24 NOTE — PROGRESS NOTES
"Norma Paul is status post laparoscopic sigmoid colectomy two weeks ago. She is doing well with mild post operative incisional pain.  She is tolerating a regular diet, ambulating with minimal pain, denies any recurrent bulges and has no other complaints at present.     EXAM:  /85   Pulse 91   Resp 18   Ht 5' 4\" (1.626 m)   Wt 148 lb (67.1 kg)   SpO2 100%   BMI 25.40 kg/m    GENERAL: Well developed female, No acute distress, pleasant and conversant   EYES: Pupils equal, round and reactive, no scleral icterus  ABDOMEN: surgical incisions clean, dry, intact without erythema  SKIN: Pink, warm and dry, no obvious rashes or lesions   NEURO:No focal deficits, ambulatory  MUSCULOSKELETAL:No obvious deformities, no swelling, normal appearing      ASSESSMENT AND PLAN:  Norma Paul is doing well postoperatively.  She may continue with her diet without any restrictions.  At this point, being 2 weeks out from surgery, I have informed her that there are no further restrictions on physical activity or straining.  She may now follow up on a prn basis if she has any other questions or concerns.     Shukri Matute MD  [phone]  [facility] Department of Surgery  "

## 2021-07-03 NOTE — ADDENDUM NOTE
Addendum Note by Shirley Roldan CRNA at 2/11/2019 12:38 PM     Author: Shirley Roldna CRNA Service: -- Author Type: Nurse Anesthetist    Filed: 2/11/2019 12:38 PM Date of Service: 2/11/2019 12:38 PM Status: Signed    : Shirley Roldan CRNA (Nurse Anesthetist)       Addendum  created 02/11/19 1238 by Shirley Roldan CRNA    Intraprocedure Meds edited

## 2021-07-21 ENCOUNTER — RECORDS - HEALTHEAST (OUTPATIENT)
Dept: ADMINISTRATIVE | Facility: CLINIC | Age: 76
End: 2021-07-21

## 2021-10-26 ENCOUNTER — ANCILLARY PROCEDURE (OUTPATIENT)
Dept: MAMMOGRAPHY | Facility: CLINIC | Age: 76
End: 2021-10-26
Attending: NURSE PRACTITIONER
Payer: COMMERCIAL

## 2021-10-26 DIAGNOSIS — C50.919 MALIGNANT NEOPLASM OF FEMALE BREAST, UNSPECIFIED ESTROGEN RECEPTOR STATUS, UNSPECIFIED LATERALITY, UNSPECIFIED SITE OF BREAST (H): ICD-10-CM

## 2021-10-26 DIAGNOSIS — Z12.31 ENCOUNTER FOR SCREENING MAMMOGRAM FOR MALIGNANT NEOPLASM OF BREAST: ICD-10-CM

## 2021-10-26 PROCEDURE — 77063 BREAST TOMOSYNTHESIS BI: CPT

## 2021-11-17 ENCOUNTER — ONCOLOGY VISIT (OUTPATIENT)
Dept: ONCOLOGY | Facility: HOSPITAL | Age: 76
End: 2021-11-17
Attending: INTERNAL MEDICINE
Payer: COMMERCIAL

## 2021-11-17 VITALS
DIASTOLIC BLOOD PRESSURE: 98 MMHG | WEIGHT: 151 LBS | BODY MASS INDEX: 25.92 KG/M2 | OXYGEN SATURATION: 97 % | RESPIRATION RATE: 12 BRPM | HEART RATE: 80 BPM | TEMPERATURE: 98.1 F | SYSTOLIC BLOOD PRESSURE: 160 MMHG

## 2021-11-17 DIAGNOSIS — C50.919 MALIGNANT NEOPLASM OF FEMALE BREAST, UNSPECIFIED ESTROGEN RECEPTOR STATUS, UNSPECIFIED LATERALITY, UNSPECIFIED SITE OF BREAST (H): Primary | ICD-10-CM

## 2021-11-17 PROCEDURE — 99213 OFFICE O/P EST LOW 20 MIN: CPT | Performed by: NURSE PRACTITIONER

## 2021-11-17 PROCEDURE — G0463 HOSPITAL OUTPT CLINIC VISIT: HCPCS

## 2021-11-17 RX ORDER — EVOLOCUMAB 140 MG/ML
INJECTION, SOLUTION SUBCUTANEOUS
COMMUNITY
Start: 2021-10-29 | End: 2023-05-22

## 2021-11-17 RX ORDER — LATANOPROST 50 UG/ML
1 SOLUTION/ DROPS OPHTHALMIC DAILY
COMMUNITY
Start: 2021-09-23

## 2021-11-17 ASSESSMENT — PAIN SCALES - GENERAL: PAINLEVEL: NO PAIN (0)

## 2021-11-17 NOTE — PROGRESS NOTES
"Oncology Rooming Note    November 17, 2021 1:26 PM   Norma Paul is a 76 year old female who presents for:    Chief Complaint   Patient presents with     Oncology Clinic Visit     Malignant neoplasm of female breast, unspecified estrogen receptor status, unspecified laterality, unspecified site of breast (H)     Initial Vitals: BP (!) 160/98 (BP Location: Right arm, Patient Position: Sitting, Cuff Size: Adult Regular)   Pulse 80   Temp 98.1  F (36.7  C) (Oral)   Resp 12   Wt 68.5 kg (151 lb)   SpO2 97%   BMI 25.92 kg/m   Estimated body mass index is 25.92 kg/m  as calculated from the following:    Height as of 5/3/21: 1.626 m (5' 4\").    Weight as of this encounter: 68.5 kg (151 lb). Body surface area is 1.76 meters squared.  No Pain (0) Comment: Data Unavailable   No LMP recorded. Patient is postmenopausal.  Allergies reviewed: Yes  Medications reviewed: Yes    Medications: Medication refills not needed today.  Pharmacy name entered into [employer]rent[employer]:    [id number], [place] - [address]  [id number] [place] [facility] [address] [facility]) [facility] - [place], [place] - [address] AT [place]  [facility] - [address] [facility]    Clinical concerns:  Malignant neoplasm of female breast, unspecified estrogen receptor status, unspecified laterality, unspecified site of breast (H)      Lashon Weaver, TRICIA            "

## 2021-11-17 NOTE — PROGRESS NOTES
[facility] Hematology and Oncology Progress Note    Patient: Norma Paul  MRN: [id number]  Date of Service: 11/17/2021        Reason for Visit    Chief Complaint   Patient presents with     Oncology Clinic Visit     Malignant neoplasm of female breast, unspecified estrogen receptor status, unspecified laterality, unspecified site of breast (H)       Assessment and Plan    Cancer Staging  No matching staging information was found for the patient.    1. Breast cancer: stage IA. . Oncotype 12. She is taking anastrozole and tolerating well. Started January 2017.  Mammogram in October was normal, will continue that annually. She will return in 6 months clinical breast exam.  She will then just be seen once a year in May.  She will then continue to get her mammograms in the fall around October/November.  She will stop her anastrozole in February 2022.     2.  osteopenia: she is at high risk for osteoporosis with aromatase inhibitors. She is taking calcium and vit d. Her last DEXA is stable and still showing osteopenia. We will continue calcium/vit d. Encourage weight bearing exercises. Repeat DEXA in 2022.     ECOG Performance    0 - Independent    Distress Screening (within last 30 days)    1. How concerned are you about your ability to eat? : 0  2. How concerned are you about unintended weight loss or your current weight? : 0  3. How concerned are you about feeling depressed or very sad? : 0  4. How concerned are you about feeling anxious or very scared? : 0  5. Do you struggle with the loss of meaning and jose alejandro in your life? : Not at all  6. How concerned are you about work and home life issues that may be affected by your cancer? : 0  7. How concerned are you about knowing what resources are available to help you? : 0  8. Do you currently have what you would describe as Yazidism or spiritual struggles?            : Not at all       Pain  Pain Score: No Pain (0)    Problem List    Patient Active Problem List    Diagnosis     Malignant neoplasm of female breast, unspecified estrogen receptor status, unspecified laterality, unspecified site of breast (H)     Urinary retention     Hyponatremia     JOE (acute kidney injury) (H)     Leukocytosis     Urinary tract infection     Uterine prolapse     Hydronephrosis     Sepsis (H)     Dehydration     Renal stone     Pelvic abscess in female     Diverticulitis of large intestine with perforation and abscess without bleeding     Hypomagnesemia     Hypokalemia     Slow transit constipation     Diverticulitis     Aromatase inhibitor use     Vaginal vault prolapse        ______________________________________________________________________________    History of Present Illness    Measurable disease: None postoperatively     Current therapy: Anastrozole 1 mg by mouth daily started January 1, 2017     Treatment history: Left lumpectomy and then adjuvant radiation therapy, 16 fractions completed December 2016     Interim History: pt is here today for follow up visit. She is doing well. Tolerating anastrozole well. No new issues with breast.     Review of Systems    Pertinent items are noted in HPI.    Past History    Past Medical History:   Diagnosis Date     Breast cancer (H) 2016    right ca     Cystocele with prolapse      Hx of radiation therapy      Hyperlipidemia      Indwelling catheter present on admission      Prolapse, uterovaginal        PHYSICAL EXAM  BP (!) 160/98 (BP Location: Right arm, Patient Position: Sitting, Cuff Size: Adult Regular)   Pulse 80   Temp 98.1  F (36.7  C) (Oral)   Resp 12   Wt 68.5 kg (151 lb)   SpO2 97%   BMI 25.92 kg/m      GENERAL: no acute distress. Cooperative in conversation. Here alone. Mask on  RESP: Regular respiratory rate. No expiratory wheezes   MUSCULOSKELETAL: no bilateral leg swelling  NEURO: non focal. Alert and oriented x3.   PSYCH: within normal limits. No depression or anxiety.  SKIN: exposed skin is dry intact.     Lab  Results    No results found for this or any previous visit (from the past 168 hour(s)).    Imaging    MA Screen Bilateral w/Glen    Result Date: 10/26/2021  BILATERAL FULL FIELD DIGITAL SCREENING MAMMOGRAM WITH TOMOSYNTHESIS Performed on: 10/26/21 Compared to: 10/19/2020, 10/17/2019, 10/16/2018, and 10/10/2017 Technique:  This study was evaluated with the assistance of Computer-Aided Detection.  Breast Tomosynthesis was used in interpretation. Findings: The breasts are heterogeneously dense, which may obscure small masses.  There are breast conservation changes in the right breast. There is no radiographic evidence of malignancy. IMPRESSION: ACR BI-RADS Category 2: Benign RECOMMENDED FOLLOW-UP: Annual routine screening mammogram The results and recommendations of this examination will be communicated to the patient.         Signed by: CRYSTAL Miranda CNP

## 2022-05-18 ENCOUNTER — ONCOLOGY VISIT (OUTPATIENT)
Dept: ONCOLOGY | Facility: HOSPITAL | Age: 77
End: 2022-05-18
Attending: NURSE PRACTITIONER
Payer: COMMERCIAL

## 2022-05-18 VITALS
HEART RATE: 85 BPM | TEMPERATURE: 99.2 F | BODY MASS INDEX: 25.81 KG/M2 | DIASTOLIC BLOOD PRESSURE: 100 MMHG | SYSTOLIC BLOOD PRESSURE: 165 MMHG | RESPIRATION RATE: 14 BRPM | HEIGHT: 64 IN | WEIGHT: 151.2 LBS | OXYGEN SATURATION: 95 %

## 2022-05-18 DIAGNOSIS — M85.89 OSTEOPENIA OF MULTIPLE SITES: ICD-10-CM

## 2022-05-18 DIAGNOSIS — Z79.811 AROMATASE INHIBITOR USE: ICD-10-CM

## 2022-05-18 DIAGNOSIS — Z12.31 ENCOUNTER FOR SCREENING MAMMOGRAM FOR MALIGNANT NEOPLASM OF BREAST: ICD-10-CM

## 2022-05-18 DIAGNOSIS — C50.919 MALIGNANT NEOPLASM OF FEMALE BREAST, UNSPECIFIED ESTROGEN RECEPTOR STATUS, UNSPECIFIED LATERALITY, UNSPECIFIED SITE OF BREAST (H): Primary | ICD-10-CM

## 2022-05-18 PROCEDURE — G0463 HOSPITAL OUTPT CLINIC VISIT: HCPCS

## 2022-05-18 PROCEDURE — 99213 OFFICE O/P EST LOW 20 MIN: CPT | Performed by: NURSE PRACTITIONER

## 2022-05-18 RX ORDER — MULTIVIT-MIN/IRON/FOLIC ACID/K 18-600-40
1 CAPSULE ORAL DAILY
COMMUNITY

## 2022-05-18 ASSESSMENT — PAIN SCALES - GENERAL: PAINLEVEL: NO PAIN (0)

## 2022-05-18 NOTE — PROGRESS NOTES
"Oncology Rooming Note    May 18, 2022 1:31 PM   Norma Paul is a 77 year old female who presents for:    Chief Complaint   Patient presents with     Oncology Clinic Visit     Return visit for 6 month follow up related to Malignant neoplasm of female breast, unspecified estrogen receptor status, unspecified laterality, unspecified site of breast (H)     Initial Vitals: BP (!) 165/100 (BP Location: Right arm, Patient Position: Sitting, Cuff Size: Adult Regular)   Pulse 85   Temp 99.2  F (37.3  C)   Resp 14   Ht 1.626 m (5' 4\")   Wt 68.6 kg (151 lb 3.2 oz)   SpO2 95%   BMI 25.95 kg/m   Estimated body mass index is 25.95 kg/m  as calculated from the following:    Height as of this encounter: 1.626 m (5' 4\").    Weight as of this encounter: 68.6 kg (151 lb 3.2 oz). Body surface area is 1.76 meters squared.  No Pain (0) Comment: Data Unavailable   No LMP recorded. Patient is postmenopausal.  Allergies reviewed: Yes  Medications reviewed: Yes    Medications: Medication refills not needed today.  Pharmacy name entered into [employer]:    [id number] - [facility], [place] - [address]  [id number] - [place] - [address] [facility] (MAIL SERVICE) [facility] - [place], [place] - [address] AT [address] [facility] [place] (SPECIALTY) [place], [place] - [address]    Clinical concerns: Return visit for 6 month follow up related to Malignant neoplasm of female breast, unspecified estrogen receptor status, unspecified laterality, unspecified site of breast (H)      QUITA ALEGRIA MA            "

## 2022-05-18 NOTE — PROGRESS NOTES
[facility] Hematology and Oncology Progress Note    Patient: Norma Paul  MRN: [id number]  Date of Service: May 18, 2022          Reason for Visit    Chief Complaint   Patient presents with     Oncology Clinic Visit     Return visit for 6 month follow up related to Malignant neoplasm of female breast, unspecified estrogen receptor status, unspecified laterality, unspecified site of breast (H)       Assessment and Plan    Cancer Staging  Malignant neoplasm of female breast, unspecified estrogen receptor status, unspecified laterality, unspecified site of breast (H)  Staging form: Breast, AJCC 7th Edition  - Pathologic: Stage IA (T1c, N0, cM0) - Signed by Ammy Wilkerson APRN CNP on 11/17/2021  ER Status: Positive  MA Status: Positive  HER2 Status: Negative      1.  History of breast cancer: stage IA. . Oncotype 12. She was taking anastrozole and tolerated quite well.  Started January 2017.  Completed in Feb. 2022. Mammogram in October was normal, will continue that annually.   She will continue to come annually in May.      2.  osteopenia: she is at high risk for osteoporosis with aromatase inhibitors. She is taking calcium and vit d. Her last DEXA is stable and still showing osteopenia. We will continue calcium/vit d. Encourage weight bearing exercises. Repeat DEXA in 2023.     ECOG Performance    0 - Independent    Distress Screening (within last 30 days)    1. How concerned are you about your ability to eat? : 0  2. How concerned are you about unintended weight loss or your current weight? : 0  3. How concerned are you about feeling depressed or very sad? : 0  4. How concerned are you about feeling anxious or very scared? : 0  5. Do you struggle with the loss of meaning and jose alejandro in your life? : Not at all  6. How concerned are you about work and home life issues that may be affected by your cancer? : 0  7. How concerned are you about knowing what resources are available to help you? : 0  8. Do you  currently have what you would describe as Muslim or spiritual struggles?            : Not at all       Pain  Pain Score: No Pain (0)    Problem List    Patient Active Problem List   Diagnosis     Malignant neoplasm of female breast, unspecified estrogen receptor status, unspecified laterality, unspecified site of breast (H)     Urinary retention     Hyponatremia     JOE (acute kidney injury) (H)     Leukocytosis     Urinary tract infection     Uterine prolapse     Hydronephrosis     Sepsis (H)     Dehydration     Renal stone     Pelvic abscess in female     Diverticulitis of large intestine with perforation and abscess without bleeding     Hypomagnesemia     Hypokalemia     Slow transit constipation     Diverticulitis     Aromatase inhibitor use     Vaginal vault prolapse        ______________________________________________________________________________    History of Present Illness    Measurable disease: None postoperatively     Current therapy: Anastrozole 1 mg by mouth daily started January 1, 2017.  Completed February, 2022     Treatment history: Left lumpectomy and then adjuvant radiation therapy, 16 fractions completed December 2016     Interim History: pt is here today for follow up visit. She is doing well.  Patient stopped taking her anastrozole couple of months ago since it had been 5 years.  She states that she feels fine.  Has not noticed any changes in her breast.  Has not noticed any new bone or back pain.  Is not losing weight.    Review of Systems    Pertinent items are noted in HPI.    Past History    Past Medical History:   Diagnosis Date     Breast cancer (H) 2016    right ca     Cystocele with prolapse      Hx of radiation therapy      Hyperlipidemia      Indwelling catheter present on admission      Prolapse, uterovaginal        PHYSICAL EXAM  BP (!) 165/100 (BP Location: Right arm, Patient Position: Sitting, Cuff Size: Adult Regular)   Pulse 85   Temp 99.2  F (37.3  C)   Resp 14   Ht  "1.626 m (5' 4\")   Wt 68.6 kg (151 lb 3.2 oz)   SpO2 95%   BMI 25.95 kg/m      GENERAL: no acute distress. Cooperative in conversation. Here with . Mask on  RESP: Regular respiratory rate. No expiratory wheezes   MUSCULOSKELETAL: no bilateral leg swelling  NEURO: non focal. Alert and oriented x3.   PSYCH: within normal limits. No depression or anxiety.  SKIN: exposed skin is dry intact.   BREAST: Bilateral exam done.  Lumpectomy incision is well-healed.  Very slight scarring.  No abnormal lesions or rashes noted.  No evidence for local recurrence bilaterally.      Lab Results    No results found for this or any previous visit (from the past 168 hour(s)).    Imaging    No results found.      Signed by: CRYSTAL Miranda CNP  "

## 2022-05-18 NOTE — LETTER
"    5/18/2022         RE: Norma Paul  [address] [facility] [address]        Dear Colleague,    Thank you for referring your patient, Norma Paul, to the [facility] [place]. Please see a copy of my visit note below.    Oncology Rooming Note    May 18, 2022 1:31 PM   Norma Paul is a 77 year old female who presents for:    Chief Complaint   Patient presents with     Oncology Clinic Visit     Return visit for 6 month follow up related to Malignant neoplasm of female breast, unspecified estrogen receptor status, unspecified laterality, unspecified site of breast (H)     Initial Vitals: BP (!) 165/100 (BP Location: Right arm, Patient Position: Sitting, Cuff Size: Adult Regular)   Pulse 85   Temp 99.2  F (37.3  C)   Resp 14   Ht 1.626 m (5' 4\")   Wt 68.6 kg (151 lb 3.2 oz)   SpO2 95%   BMI 25.95 kg/m   Estimated body mass index is 25.95 kg/m  as calculated from the following:    Height as of this encounter: 1.626 m (5' 4\").    Weight as of this encounter: 68.6 kg (151 lb 3.2 oz). Body surface area is 1.76 meters squared.  No Pain (0) Comment: Data Unavailable   No LMP recorded. Patient is postmenopausal.  Allergies reviewed: Yes  Medications reviewed: Yes    Medications: Medication refills not needed today.  Pharmacy name entered into [employer]:    [id number] - [facility], [place] - [address]  [id number] - [place], [place] - [address] [facility]  [facility] - [place], [place] - [address] AT [place] [address] [facility] [place], [place] - [address] [facility] [place]    Clinical concerns: Return visit for 6 month follow up related to Malignant neoplasm of female breast, unspecified estrogen receptor status, unspecified laterality, unspecified site of breast (H)      QUITA ALEGRIA MA              [facility] Hematology and Oncology Progress Note    Patient: Norma Paul  MRN: " [id number]  Date of Service: May 18, 2022          Reason for Visit    Chief Complaint   Patient presents with     Oncology Clinic Visit     Return visit for 6 month follow up related to Malignant neoplasm of female breast, unspecified estrogen receptor status, unspecified laterality, unspecified site of breast (H)       Assessment and Plan    Cancer Staging  Malignant neoplasm of female breast, unspecified estrogen receptor status, unspecified laterality, unspecified site of breast (H)  Staging form: Breast, AJCC 7th Edition  - Pathologic: Stage IA (T1c, N0, cM0) - Signed by Ammy Wilkerson APRN CNP on 11/17/2021  ER Status: Positive  CO Status: Positive  HER2 Status: Negative      1.  History of breast cancer: stage IA. . Oncotype 12. She was taking anastrozole and tolerated quite well.  Started January 2017.  Completed in Feb. 2022. Mammogram in October was normal, will continue that annually.   She will continue to come annually in May.      2.  osteopenia: she is at high risk for osteoporosis with aromatase inhibitors. She is taking calcium and vit d. Her last DEXA is stable and still showing osteopenia. We will continue calcium/vit d. Encourage weight bearing exercises. Repeat DEXA in 2023.     ECOG Performance    0 - Independent    Distress Screening (within last 30 days)    1. How concerned are you about your ability to eat? : 0  2. How concerned are you about unintended weight loss or your current weight? : 0  3. How concerned are you about feeling depressed or very sad? : 0  4. How concerned are you about feeling anxious or very scared? : 0  5. Do you struggle with the loss of meaning and jose alejandro in your life? : Not at all  6. How concerned are you about work and home life issues that may be affected by your cancer? : 0  7. How concerned are you about knowing what resources are available to help you? : 0  8. Do you currently have what you would describe as Restorationist or spiritual struggles?            : Not  "at all       Pain  Pain Score: No Pain (0)    Problem List    Patient Active Problem List   Diagnosis     Malignant neoplasm of female breast, unspecified estrogen receptor status, unspecified laterality, unspecified site of breast (H)     Urinary retention     Hyponatremia     JOE (acute kidney injury) (H)     Leukocytosis     Urinary tract infection     Uterine prolapse     Hydronephrosis     Sepsis (H)     Dehydration     Renal stone     Pelvic abscess in female     Diverticulitis of large intestine with perforation and abscess without bleeding     Hypomagnesemia     Hypokalemia     Slow transit constipation     Diverticulitis     Aromatase inhibitor use     Vaginal vault prolapse        ______________________________________________________________________________    History of Present Illness    Measurable disease: None postoperatively     Current therapy: Anastrozole 1 mg by mouth daily started January 1, 2017.  Completed February, 2022     Treatment history: Left lumpectomy and then adjuvant radiation therapy, 16 fractions completed December 2016     Interim History: pt is here today for follow up visit. She is doing well.  Patient stopped taking her anastrozole couple of months ago since it had been 5 years.  She states that she feels fine.  Has not noticed any changes in her breast.  Has not noticed any new bone or back pain.  Is not losing weight.    Review of Systems    Pertinent items are noted in HPI.    Past History    Past Medical History:   Diagnosis Date     Breast cancer (H) 2016    right ca     Cystocele with prolapse      Hx of radiation therapy      Hyperlipidemia      Indwelling catheter present on admission      Prolapse, uterovaginal        PHYSICAL EXAM  BP (!) 165/100 (BP Location: Right arm, Patient Position: Sitting, Cuff Size: Adult Regular)   Pulse 85   Temp 99.2  F (37.3  C)   Resp 14   Ht 1.626 m (5' 4\")   Wt 68.6 kg (151 lb 3.2 oz)   SpO2 95%   BMI 25.95 kg/m      GENERAL: no " acute distress. Cooperative in conversation. Here with . Mask on  RESP: Regular respiratory rate. No expiratory wheezes   MUSCULOSKELETAL: no bilateral leg swelling  NEURO: non focal. Alert and oriented x3.   PSYCH: within normal limits. No depression or anxiety.  SKIN: exposed skin is dry intact.   BREAST: Bilateral exam done.  Lumpectomy incision is well-healed.  Very slight scarring.  No abnormal lesions or rashes noted.  No evidence for local recurrence bilaterally.      Lab Results    No results found for this or any previous visit (from the past 168 hour(s)).    Imaging    No results found.      Signed by: CRYSTAL Miranda CNP      Again, thank you for allowing me to participate in the care of your patient.        Sincerely,        CRYSTAL Miranda CNP

## 2022-07-20 ENCOUNTER — TRANSFERRED RECORDS (OUTPATIENT)
Dept: HEALTH INFORMATION MANAGEMENT | Facility: CLINIC | Age: 77
End: 2022-07-20

## 2022-07-20 ENCOUNTER — MEDICAL CORRESPONDENCE (OUTPATIENT)
Dept: HEALTH INFORMATION MANAGEMENT | Facility: CLINIC | Age: 77
End: 2022-07-20

## 2022-07-20 ENCOUNTER — LAB REQUISITION (OUTPATIENT)
Dept: LAB | Facility: CLINIC | Age: 77
End: 2022-07-20

## 2022-07-20 DIAGNOSIS — R41.3 OTHER AMNESIA: ICD-10-CM

## 2022-07-20 DIAGNOSIS — R53.83 OTHER FATIGUE: ICD-10-CM

## 2022-07-20 LAB
TSH SERPL DL<=0.005 MIU/L-ACNC: 0.83 UIU/ML (ref 0.3–4.2)
VIT B12 SERPL-MCNC: 324 PG/ML (ref 232–1245)

## 2022-07-20 PROCEDURE — 84443 ASSAY THYROID STIM HORMONE: CPT | Performed by: FAMILY MEDICINE

## 2022-07-20 PROCEDURE — 82306 VITAMIN D 25 HYDROXY: CPT | Performed by: FAMILY MEDICINE

## 2022-07-20 PROCEDURE — 82607 VITAMIN B-12: CPT | Performed by: FAMILY MEDICINE

## 2022-07-20 PROCEDURE — 80053 COMPREHEN METABOLIC PANEL: CPT | Performed by: FAMILY MEDICINE

## 2022-07-21 LAB
ALBUMIN SERPL BCG-MCNC: 4.1 G/DL (ref 3.5–5.2)
ALP SERPL-CCNC: 76 U/L (ref 35–104)
ALT SERPL W P-5'-P-CCNC: 22 U/L (ref 10–35)
ANION GAP SERPL CALCULATED.3IONS-SCNC: 11 MMOL/L (ref 7–15)
AST SERPL W P-5'-P-CCNC: 27 U/L (ref 10–35)
BILIRUB SERPL-MCNC: 0.6 MG/DL
BUN SERPL-MCNC: 11 MG/DL (ref 8–23)
CALCIUM SERPL-MCNC: 9.7 MG/DL (ref 8.8–10.2)
CHLORIDE SERPL-SCNC: 105 MMOL/L (ref 98–107)
CREAT SERPL-MCNC: 0.73 MG/DL (ref 0.51–0.95)
DEPRECATED CALCIDIOL+CALCIFEROL SERPL-MC: 39 UG/L (ref 20–75)
DEPRECATED HCO3 PLAS-SCNC: 27 MMOL/L (ref 22–29)
GFR SERPL CREATININE-BSD FRML MDRD: 84 ML/MIN/1.73M2
GLUCOSE SERPL-MCNC: 97 MG/DL (ref 70–99)
POTASSIUM SERPL-SCNC: 4.3 MMOL/L (ref 3.4–5.3)
PROT SERPL-MCNC: 6.2 G/DL (ref 6.4–8.3)
SODIUM SERPL-SCNC: 143 MMOL/L (ref 136–145)

## 2022-07-25 ENCOUNTER — TRANSCRIBE ORDERS (OUTPATIENT)
Dept: OTHER | Age: 77
End: 2022-07-25

## 2022-07-25 DIAGNOSIS — R41.3 MEMORY CHANGE: Primary | ICD-10-CM

## 2022-09-14 ENCOUNTER — HOSPITAL ENCOUNTER (OUTPATIENT)
Dept: BONE DENSITY | Facility: HOSPITAL | Age: 77
Discharge: HOME | End: 2022-09-14
Attending: NURSE PRACTITIONER | Admitting: NURSE PRACTITIONER
Payer: COMMERCIAL

## 2022-09-14 DIAGNOSIS — Z79.811 AROMATASE INHIBITOR USE: ICD-10-CM

## 2022-09-14 DIAGNOSIS — C50.919 MALIGNANT NEOPLASM OF FEMALE BREAST, UNSPECIFIED ESTROGEN RECEPTOR STATUS, UNSPECIFIED LATERALITY, UNSPECIFIED SITE OF BREAST (H): ICD-10-CM

## 2022-09-14 DIAGNOSIS — M85.89 OSTEOPENIA OF MULTIPLE SITES: ICD-10-CM

## 2022-09-14 PROCEDURE — 77080 DXA BONE DENSITY AXIAL: CPT

## 2022-09-15 ENCOUNTER — TELEPHONE (OUTPATIENT)
Dept: ONCOLOGY | Facility: HOSPITAL | Age: 77
End: 2022-09-15

## 2022-09-15 NOTE — TELEPHONE ENCOUNTER
"[facility]: Cancer Care                                                                                          I call Norma to report: \"DEXA is overall stable. Slight worsening. Still osteopenia.\"     I was unable to get a hold of Norma. I spoke with her , Jony. He verbalized understanding and appreciation of our conversation.      Signature:  Michelle Saez RN    "

## 2022-09-28 ENCOUNTER — TELEPHONE (OUTPATIENT)
Dept: NEUROLOGY | Facility: CLINIC | Age: 77
End: 2022-09-28

## 2022-09-28 ENCOUNTER — OFFICE VISIT (OUTPATIENT)
Dept: NEUROLOGY | Facility: CLINIC | Age: 77
End: 2022-09-28
Payer: COMMERCIAL

## 2022-09-28 VITALS
WEIGHT: 150 LBS | DIASTOLIC BLOOD PRESSURE: 84 MMHG | HEART RATE: 86 BPM | RESPIRATION RATE: 16 BRPM | BODY MASS INDEX: 25.75 KG/M2 | SYSTOLIC BLOOD PRESSURE: 122 MMHG

## 2022-09-28 DIAGNOSIS — E53.8 LOW SERUM VITAMIN B12: Primary | ICD-10-CM

## 2022-09-28 DIAGNOSIS — R41.89 SUBJECTIVE MEMORY COMPLAINTS: ICD-10-CM

## 2022-09-28 DIAGNOSIS — G47.00 INSOMNIA, UNSPECIFIED TYPE: ICD-10-CM

## 2022-09-28 PROCEDURE — 99205 OFFICE O/P NEW HI 60 MIN: CPT | Performed by: PSYCHIATRY & NEUROLOGY

## 2022-09-28 RX ORDER — LANOLIN ALCOHOL/MO/W.PET/CERES
1000 CREAM (GRAM) TOPICAL DAILY
Qty: 90 TABLET | Refills: 1 | Status: SHIPPED | OUTPATIENT
Start: 2022-09-28

## 2022-09-28 RX ORDER — GABAPENTIN 300 MG/1
300 CAPSULE ORAL 3 TIMES DAILY
Qty: 90 CAPSULE | Refills: 1 | Status: SHIPPED | OUTPATIENT
Start: 2022-09-28 | End: 2022-12-19

## 2022-09-28 ASSESSMENT — MONTREAL COGNITIVE ASSESSMENT (MOCA)
6. READ LIST OF DIGITS [FORWARD/BACKWARD]: 1
4. NAME EACH OF THE THREE ANIMALS SHOWN: 2
WHAT LEVEL OF EDUCATION WAS ATTAINED: 0
8. SERIAL SUBTRACTION OF 7S: 0
12. MEMORY INDEX SCORE: 0
7. [VIGILENCE] TAP WHEN HEARING DESIGNATED LETTER: 0
13. ORIENTATION SUBSCORE: 1
9. REPEAT EACH SENTENCE: 2
10. [FLUENCY] NAME WORDS STARTING WITH DESIGNATED LETTER: 0
WHAT IS THE TOTAL SCORE (OUT OF 30): 8
VISUOSPATIAL/EXECUTIVE SUBSCORE: 1
11. FOR EACH PAIR OF WORDS, WHAT CATEGORY DO THEY BELONG TO (OUT OF 2): 1

## 2022-09-28 NOTE — LETTER
9/28/2022         RE: Norma Paul  [address]  [facility] [address]        Dear Colleague,    Thank you for referring your patient, Norma Paul, to the [facility] [place]. Please see a copy of my visit note below.    NEUROLOGY OUTPATIENT CONSULT NOTE   Sep 28, 2022     CHIEF COMPLAINT/REASON FOR VISIT/REASON FOR CONSULT  Patient presents with:  Memory Loss    REASON FOR CONSULTATION-memory problems    REFERRAL SOURCE  Dr. Jodie Vu  CC Dr. Jodie Vu    HISTORY OF PRESENT ILLNESS  Norma Paul is a 77 year old female seen today for evaluation of cognitive patient is accompanied by .  1.  Patient symptoms started about last September.  They have progressively been getting worse.  She has more short-term issues, long-term memory issues.  Has difficulty finding words when she is trying to express herself.  Is currently not driving because of cognitive difficulties.  Has difficulty staying asleep and falling asleep.  This may be contributing to the cognitive issues.  She has bladder issues and has to get up in the middle of the night to go to the bathroom and then has difficulty falling asleep.    2.  There is no family history of dementia however there are several maternal aunts who have showing memory loss and confusion.  The patient's mother did possibly have memory problems in the later years of her life    3.  Per the family things that they have noticed:-  -Word retrieval and name recall difficulty  - Forgotten family names unable to recall past events  - Difficulty explaining and recalling where she is  - Difficulty recalling dates and people's names  - Asking the same questions over and over again  - Being anxious and knowing where certain things are kept  - Difficulty recalling how she got to a particular place  - Struggling with vocabulary and finding the right words and naming familiar objects  - Difficulty locating where she put things  - Difficulty  sequencing events    Previous history is reviewed and this is unchanged.    PAST MEDICAL/SURGICAL HISTORY  Past Medical History:   Diagnosis Date     Breast cancer (H) 2016    right ca     Cystocele with prolapse      Hx of radiation therapy      Hyperlipidemia      Indwelling catheter present on admission      Prolapse, uterovaginal      Patient Active Problem List   Diagnosis     Malignant neoplasm of female breast, unspecified estrogen receptor status, unspecified laterality, unspecified site of breast (H)     Urinary retention     Hyponatremia     JOE (acute kidney injury) (H)     Leukocytosis     Urinary tract infection     Uterine prolapse     Hydronephrosis     Sepsis (H)     Dehydration     Renal stone     Pelvic abscess in female     Diverticulitis of large intestine with perforation and abscess without bleeding     Hypomagnesemia     Hypokalemia     Slow transit constipation     Diverticulitis     Aromatase inhibitor use     Vaginal vault prolapse   Significant for high cholesterol, breast cancer in remission with use of radiation and chemotherapy    FAMILY HISTORY  Family History   Problem Relation Age of Onset     Breast Cancer Cousin 73.00     Cancer Cousin         Bladder     Heart Disease Mother      Hypertension Mother      Heart Disease Father      Hypertension Father      Atrial fibrillation Sister      Thyroid Disease Sister      Skin Cancer Sister 73.00     No Known Problems Daughter      No Known Problems Son      Lung Cancer Maternal Uncle 67.00   Positive for memory loss in her mother and multiple maternal aunts    SOCIAL HISTORY  Social History     Tobacco Use     Smoking status: Never Smoker     Smokeless tobacco: Never Used   Vaping Use     Vaping Use: Never used   Substance Use Topics     Alcohol use: Not Currently     Drug use: No       SYSTEMS REVIEW  Twelve-system ROS was done and other than the HPI this was negative except for bladder symptoms and frequent urination, sleepiness during  the daytime, confusion, anxiety, constipation    MEDICATIONS  latanoprost (XALATAN) 0.005 % ophthalmic solution, Place 1 drop into both eyes daily  Vitamin D (Cholecalciferol) 25 MCG (1000 UT) TABS, Take 1 tablet by mouth daily  acetaminophen (TYLENOL) 500 MG tablet, [ACETAMINOPHEN (TYLENOL) 500 MG TABLET] Take 500-1,000 mg by mouth every 6 (six) hours as needed for pain. (Patient not taking: Reported on 9/28/2022)  anastrozole (ARIMIDEX) 1 MG tablet, TAKE 1 TABLET BY MOUTH DAILY GENERIC EQUIVALENT FOR ARIMIDEX (Patient not taking: No sig reported)  aspirin 81 MG EC tablet, [ASPIRIN 81 MG EC TABLET] Take 162 mg by mouth every evening.        (Patient not taking: No sig reported)  ibuprofen (ADVIL) 200 MG tablet, [IBUPROFEN (ADVIL) 200 MG TABLET] Take 3 tablets (600 mg total) by mouth every 6 (six) hours as needed for pain. (Patient not taking: Reported on 9/28/2022)  multivitamin (ONE A DAY) per tablet, [MULTIVITAMIN (ONE A DAY) PER TABLET] Take 1 tablet by mouth daily.  REPATHA SURECLICK 140 MG/ML prefilled autoinjector,   senna-docusate (SENNOSIDES-DOCUSATE SODIUM) 8.6-50 mg tablet, [SENNA-DOCUSATE (SENNOSIDES-DOCUSATE SODIUM) 8.6-50 MG TABLET] Take 1 tablet by mouth 2 (two) times a day. To help prevent constipation post-op and while on pain medications. (Patient not taking: Reported on 9/28/2022)    No current facility-administered medications on file prior to visit.       PHYSICAL EXAMINATION  VITALS: /84   Pulse 86   Resp 16   Wt 68 kg (150 lb)   BMI 25.75 kg/m    GENERAL: Healthy appearing, alert, no acute distress, normal habitus.  CARDIOVASCULAR: Extremities warm and well perfused. Pulses present.   EYES: Funduscopic exam limited  NEUROLOGICAL:  Patient is awake and partially oriented to self, place and time.  Attention span is normal.  Memory is limited; MoCA 8.  Language is fluent and follows commands appropriately.  Appropriate fund of knowledge. Cranial nerves 2-12 are intact. There is no  pronator drift.  Motor exam shows 5/5 strength in all extremities.  Tone is symmetric bilaterally in upper and lower extremities.  Reflexes are symmetric and 1+ in upper extremities and lower extremities. Sensory exam is grossly intact to light touch, pin prick and vibration.  Finger to nose and heel to shin is without dysmetria.  Romberg is negative.  Gait is normal and the patient is able to do tandem walk and walk on toes and heels with mild difficulty.    DIAGNOSTICS  RELEVANT LABS  Component      Latest Ref Rng & Units 7/20/2022   Sodium      136 - 145 mmol/L 143   Potassium      3.4 - 5.3 mmol/L 4.3   Creatinine      0.51 - 0.95 mg/dL 0.73   Urea Nitrogen      8.0 - 23.0 mg/dL 11.0   Chloride      98 - 107 mmol/L 105   Carbon Dioxide (CO2)      22 - 29 mmol/L 27   Anion Gap      7 - 15 mmol/L 11   Glucose      70 - 99 mg/dL 97   Calcium      8.8 - 10.2 mg/dL 9.7   Protein Total      6.4 - 8.3 g/dL 6.2 (L)   Albumin      3.5 - 5.2 g/dL 4.1   Bilirubin Total      <=1.2 mg/dL 0.6   Alkaline Phosphatase      35 - 104 U/L 76   AST      10 - 35 U/L 27   ALT      10 - 35 U/L 22   GFR Estimate      >60 mL/min/1.73m2 84   TSH      0.30 - 4.20 uIU/mL 0.83   Vitamin B12      232 - 1,245 pg/mL 324       OUTSIDE RECORDS  Outside referral notes and chart notes were reviewed and pertinent information has been summarized (in addition to the HPI):-  Cancer notes              IMPRESSION/REPORT/PLAN  Low serum vitamin B12  Frequent urination  Subjective memory complaints  Insomnia, unspecified type    This is a 77 year old female with cognitive problems since September 2021.  She does score really low on the [place] today.  I would like to get a MRI of the brain to rule out any structural lesions and evaluate for other causes of cognitive difficulty.  Blood work checked through primary care which does show slightly low vitamin B12 level which could be causing her cognitive difficulty and will put her on a vitamin B12 supplement to  see if that helps.  Furthermore she does have frequent urination though no other findings suggestive of normal pressure hydrocephalus.  We will see what the MRI of the brain shows.    She further has severe insomnia which could be causing her cognitive difficulty and will put her on gabapentin to see if that helps.    If none of the above treatment options are helping she will need a neuropsychology evaluation to evaluate her for possible Alzheimer's dementia.  We will put in the referral though we will see her in 3 months before the neuropsychology appointment.    Return in 3 months.    -     cyanocobalamin (VITAMIN B-12) 1000 MCG tablet; Take 1 tablet (1,000 mcg) by mouth daily  -     MR Brain w/o Contrast; Future  -     Adult Neuropsychology Referral; Future  -     gabapentin (NEURONTIN) 300 MG capsule; Take 1 capsule (300 mg) by mouth 3 times daily    Return in about 3 months (around 12/28/2022) for After testing, In-Clinic Visit (must).    Over 70 minutes were spent coordinating the care for the patient on the day of the encounter.  This includes previsit, during visit and post visit activities as documented above.  Counseling patient and family.  Family had multiple questions.  Reviewing chart.  Testing ordered.  Multiple problems reviewed/addressed.  (Activities include but not inclusive of reviewing chart, reviewing outside records, reviewing labs and imaging study results as well as the images, patient visit time including getting history and exam,  use if applicable, review of test results with the patient and coming up with a plan in a shared model, counseling patient and family, education and answering patient questions, EMR , EMR diagnosis entry and problem list management, medication reconciliation and prescription management if applicable, paperwork if applicable, printing documents and documentation of the visit activities.)        Nuno Sams MD  Neurologist  [facility]  [facility]  Tel:- [phone]    This note was dictated using voice recognition software.  Any grammatical or context distortions are unintentional and inherent to the software.        Again, thank you for allowing me to participate in the care of your patient.        Sincerely,        Nuno Sams MD

## 2022-09-28 NOTE — PROGRESS NOTES
NEUROLOGY OUTPATIENT CONSULT NOTE   Sep 28, 2022     CHIEF COMPLAINT/REASON FOR VISIT/REASON FOR CONSULT  Patient presents with:  Memory Loss    REASON FOR CONSULTATION-memory problems    REFERRAL SOURCE  Dr. Jodie Vu  CC Dr. Jodie Vu    HISTORY OF PRESENT ILLNESS  Norma Paul is a 77 year old female seen today for evaluation of cognitive patient is accompanied by .  1.  Patient symptoms started about last September.  They have progressively been getting worse.  She has more short-term issues, long-term memory issues.  Has difficulty finding words when she is trying to express herself.  Is currently not driving because of cognitive difficulties.  Has difficulty staying asleep and falling asleep.  This may be contributing to the cognitive issues.  She has bladder issues and has to get up in the middle of the night to go to the bathroom and then has difficulty falling asleep.    2.  There is no family history of dementia however there are several maternal aunts who have showing memory loss and confusion.  The patient's mother did possibly have memory problems in the later years of her life    3.  Per the family things that they have noticed:-  -Word retrieval and name recall difficulty  - Forgotten family names unable to recall past events  - Difficulty explaining and recalling where she is  - Difficulty recalling dates and people's names  - Asking the same questions over and over again  - Being anxious and knowing where certain things are kept  - Difficulty recalling how she got to a particular place  - Struggling with vocabulary and finding the right words and naming familiar objects  - Difficulty locating where she put things  - Difficulty sequencing events    Previous history is reviewed and this is unchanged.    PAST MEDICAL/SURGICAL HISTORY  Past Medical History:   Diagnosis Date     Breast cancer (H) 2016    right ca     Cystocele with prolapse      Hx of radiation therapy       Hyperlipidemia      Indwelling catheter present on admission      Prolapse, uterovaginal      Patient Active Problem List   Diagnosis     Malignant neoplasm of female breast, unspecified estrogen receptor status, unspecified laterality, unspecified site of breast (H)     Urinary retention     Hyponatremia     JOE (acute kidney injury) (H)     Leukocytosis     Urinary tract infection     Uterine prolapse     Hydronephrosis     Sepsis (H)     Dehydration     Renal stone     Pelvic abscess in female     Diverticulitis of large intestine with perforation and abscess without bleeding     Hypomagnesemia     Hypokalemia     Slow transit constipation     Diverticulitis     Aromatase inhibitor use     Vaginal vault prolapse   Significant for high cholesterol, breast cancer in remission with use of radiation and chemotherapy    FAMILY HISTORY  Family History   Problem Relation Age of Onset     Breast Cancer Cousin 73.00     Cancer Cousin         Bladder     Heart Disease Mother      Hypertension Mother      Heart Disease Father      Hypertension Father      Atrial fibrillation Sister      Thyroid Disease Sister      Skin Cancer Sister 73.00     No Known Problems Daughter      No Known Problems Son      Lung Cancer Maternal Uncle 67.00   Positive for memory loss in her mother and multiple maternal aunts    SOCIAL HISTORY  Social History     Tobacco Use     Smoking status: Never Smoker     Smokeless tobacco: Never Used   Vaping Use     Vaping Use: Never used   Substance Use Topics     Alcohol use: Not Currently     Drug use: No       SYSTEMS REVIEW  Twelve-system ROS was done and other than the HPI this was negative except for bladder symptoms and frequent urination, sleepiness during the daytime, confusion, anxiety, constipation    MEDICATIONS  latanoprost (XALATAN) 0.005 % ophthalmic solution, Place 1 drop into both eyes daily  Vitamin D (Cholecalciferol) 25 MCG (1000 UT) TABS, Take 1 tablet by mouth daily  acetaminophen  (TYLENOL) 500 MG tablet, [ACETAMINOPHEN (TYLENOL) 500 MG TABLET] Take 500-1,000 mg by mouth every 6 (six) hours as needed for pain. (Patient not taking: Reported on 9/28/2022)  anastrozole (ARIMIDEX) 1 MG tablet, TAKE 1 TABLET BY MOUTH DAILY GENERIC EQUIVALENT FOR ARIMIDEX (Patient not taking: No sig reported)  aspirin 81 MG EC tablet, [ASPIRIN 81 MG EC TABLET] Take 162 mg by mouth every evening.        (Patient not taking: No sig reported)  ibuprofen (ADVIL) 200 MG tablet, [IBUPROFEN (ADVIL) 200 MG TABLET] Take 3 tablets (600 mg total) by mouth every 6 (six) hours as needed for pain. (Patient not taking: Reported on 9/28/2022)  multivitamin (ONE A DAY) per tablet, [MULTIVITAMIN (ONE A DAY) PER TABLET] Take 1 tablet by mouth daily.  REPATHA SURECLICK 140 MG/ML prefilled autoinjector,   senna-docusate (SENNOSIDES-DOCUSATE SODIUM) 8.6-50 mg tablet, [SENNA-DOCUSATE (SENNOSIDES-DOCUSATE SODIUM) 8.6-50 MG TABLET] Take 1 tablet by mouth 2 (two) times a day. To help prevent constipation post-op and while on pain medications. (Patient not taking: Reported on 9/28/2022)    No current facility-administered medications on file prior to visit.       PHYSICAL EXAMINATION  VITALS: /84   Pulse 86   Resp 16   Wt 68 kg (150 lb)   BMI 25.75 kg/m    GENERAL: Healthy appearing, alert, no acute distress, normal habitus.  CARDIOVASCULAR: Extremities warm and well perfused. Pulses present.   EYES: Funduscopic exam limited  NEUROLOGICAL:  Patient is awake and partially oriented to self, place and time.  Attention span is normal.  Memory is limited; MoCA 8.  Language is fluent and follows commands appropriately.  Appropriate fund of knowledge. Cranial nerves 2-12 are intact. There is no pronator drift.  Motor exam shows 5/5 strength in all extremities.  Tone is symmetric bilaterally in upper and lower extremities.  Reflexes are symmetric and 1+ in upper extremities and lower extremities. Sensory exam is grossly intact to light  touch, pin prick and vibration.  Finger to nose and heel to shin is without dysmetria.  Romberg is negative.  Gait is normal and the patient is able to do tandem walk and walk on toes and heels with mild difficulty.    DIAGNOSTICS  RELEVANT LABS  Component      Latest Ref Rng & Units 7/20/2022   Sodium      136 - 145 mmol/L 143   Potassium      3.4 - 5.3 mmol/L 4.3   Creatinine      0.51 - 0.95 mg/dL 0.73   Urea Nitrogen      8.0 - 23.0 mg/dL 11.0   Chloride      98 - 107 mmol/L 105   Carbon Dioxide (CO2)      22 - 29 mmol/L 27   Anion Gap      7 - 15 mmol/L 11   Glucose      70 - 99 mg/dL 97   Calcium      8.8 - 10.2 mg/dL 9.7   Protein Total      6.4 - 8.3 g/dL 6.2 (L)   Albumin      3.5 - 5.2 g/dL 4.1   Bilirubin Total      <=1.2 mg/dL 0.6   Alkaline Phosphatase      35 - 104 U/L 76   AST      10 - 35 U/L 27   ALT      10 - 35 U/L 22   GFR Estimate      >60 mL/min/1.73m2 84   TSH      0.30 - 4.20 uIU/mL 0.83   Vitamin B12      232 - 1,245 pg/mL 324       OUTSIDE RECORDS  Outside referral notes and chart notes were reviewed and pertinent information has been summarized (in addition to the HPI):-  Cancer notes              IMPRESSION/REPORT/PLAN  Low serum vitamin B12  Frequent urination  Subjective memory complaints  Insomnia, unspecified type    This is a 77 year old female with cognitive problems since September 2021.  She does score really low on the [place] today.  I would like to get a MRI of the brain to rule out any structural lesions and evaluate for other causes of cognitive difficulty.  Blood work checked through primary care which does show slightly low vitamin B12 level which could be causing her cognitive difficulty and will put her on a vitamin B12 supplement to see if that helps.  Furthermore she does have frequent urination though no other findings suggestive of normal pressure hydrocephalus.  We will see what the MRI of the brain shows.    She further has severe insomnia which could be causing her  cognitive difficulty and will put her on gabapentin to see if that helps.    If none of the above treatment options are helping she will need a neuropsychology evaluation to evaluate her for possible Alzheimer's dementia.  We will put in the referral though we will see her in 3 months before the neuropsychology appointment.    Return in 3 months.    -     cyanocobalamin (VITAMIN B-12) 1000 MCG tablet; Take 1 tablet (1,000 mcg) by mouth daily  -     MR Brain w/o Contrast; Future  -     Adult Neuropsychology Referral; Future  -     gabapentin (NEURONTIN) 300 MG capsule; Take 1 capsule (300 mg) by mouth 3 times daily    Return in about 3 months (around 12/28/2022) for After testing, In-Clinic Visit (must).    Over 70 minutes were spent coordinating the care for the patient on the day of the encounter.  This includes previsit, during visit and post visit activities as documented above.  Counseling patient and family.  Family had multiple questions.  Reviewing chart.  Testing ordered.  Multiple problems reviewed/addressed.  (Activities include but not inclusive of reviewing chart, reviewing outside records, reviewing labs and imaging study results as well as the images, patient visit time including getting history and exam,  use if applicable, review of test results with the patient and coming up with a plan in a shared model, counseling patient and family, education and answering patient questions, EMR , EMR diagnosis entry and problem list management, medication reconciliation and prescription management if applicable, paperwork if applicable, printing documents and documentation of the visit activities.)        Nuno Sams MD  Neurologist  [facility]  Tel:- [phone]    This note was dictated using voice recognition software.  Any grammatical or context distortions are unintentional and inherent to the software.

## 2022-09-28 NOTE — TELEPHONE ENCOUNTER
[facility] Call Center    Phone Message    May a detailed message be left on voicemail: yes     Reason for Call: Medication Question or concern regarding medication   Prescription Clarification  Name of Medication: Vitamin B12    Prescribing Provider:       What on the order needs clarification?     Patient  Jony called to speak to  regarding her vitamin B12 prescription, please call back at [phone]          Action Taken: Other: [facility]    Travel Screening: Not Applicable

## 2022-09-29 NOTE — TELEPHONE ENCOUNTER
Postponing 1 week- as reminder to call with MRI results.     Jorje Alberto RN, BSN  [facility]

## 2022-09-29 NOTE — TELEPHONE ENCOUNTER
Returned call to patients spouse Jony. He is concerned that patient won't get treatment plan or medication if indicated until June after neuropsych testing.     RN informed Jony that we would return call next week after MRI with those results and any additional suggestions that Dr. Sams has at that time. He is agreeable.     Routing to Dr. Sams as FYMARYSOL that patient/spouse would like MRI results via phone call when available next week.     Jorje Alberto RN, BSN  [facility]

## 2022-10-06 ENCOUNTER — HOSPITAL ENCOUNTER (OUTPATIENT)
Dept: MRI IMAGING | Facility: HOSPITAL | Age: 77
Discharge: HOME | End: 2022-10-06
Attending: PSYCHIATRY & NEUROLOGY | Admitting: PSYCHIATRY & NEUROLOGY
Payer: COMMERCIAL

## 2022-10-06 DIAGNOSIS — R41.89 SUBJECTIVE MEMORY COMPLAINTS: ICD-10-CM

## 2022-10-06 PROCEDURE — 70551 MRI BRAIN STEM W/O DYE: CPT

## 2022-10-11 NOTE — TELEPHONE ENCOUNTER
LM asking pt/spouse to return call to discuss result note below, they asked for call instead of letter.     Jorje Alberto, RN, BSN  [facility]      MRI shows age-related changes.  No concerning findings or explanation for the memory problems.  No change in plan as discussed during the clinic appointment.

## 2022-10-11 NOTE — TELEPHONE ENCOUNTER
Spoke with patients spouse, Jony. Informed him that MRI showed age related changes and further recommendations after neuropsych testing. He is concerned about the long wait for Norma to be seen (June 2023). As we do not have anything sooner at this time, he is asking if Dr. Sams is willing to start her on a medication to help slow the disease process (such as Aricept) or if she has to wait until after that neuropsych testing.   Please advise  Judi Galan CMA on 10/11/2022 at 4:38 PM

## 2022-10-12 NOTE — TELEPHONE ENCOUNTER
Can discuss the medication at the next appointment.  The medication is reserved for patients with dementia.  For right now we need to see if having her sleep better and try B12 supplementation helps with her cognition.

## 2022-10-13 NOTE — TELEPHONE ENCOUNTER
[facility] Call Center    Phone Message    May a detailed message be left on voicemail: yes     Reason for Call: Other: Pt's  Jony requesting a call back.      Please call Jony at [phone] to discuss further.    Action Taken: Message routed to:  Other: [facility]    Travel Screening: Not Applicable

## 2022-10-14 NOTE — TELEPHONE ENCOUNTER
Returned Jony's call.    Dr. Smas, your message was relayed to Jony. Jony understood your message but he have a question for you. Per Jony, he had been giving the pt 5000 mcg of the Vitamin B12, 1 tablet daily that he bought from [employer]. He was wondering if there are any benefits of giving her 2 tablets of the Vitamin B12 5000 mcg daily (total of 10,000 mcg)? If there are benefits then he will give Norma 2 tablets daily instead of 1 tablet. But if it does not benefit, he will continue to give her 1 tablet daily.    Please advise. Thanks.    ZANE Chilel on 10/14/2022 at 10:48 AM

## 2022-10-17 ENCOUNTER — LAB REQUISITION (OUTPATIENT)
Dept: LAB | Facility: CLINIC | Age: 77
End: 2022-10-17

## 2022-10-17 DIAGNOSIS — E53.8 DEFICIENCY OF OTHER SPECIFIED B GROUP VITAMINS: ICD-10-CM

## 2022-10-17 LAB — VIT B12 SERPL-MCNC: >4000 PG/ML (ref 232–1245)

## 2022-10-17 PROCEDURE — 82607 VITAMIN B-12: CPT | Performed by: FAMILY MEDICINE

## 2022-10-26 NOTE — TELEPHONE ENCOUNTER
Called and spoke with patients , they had her levels checked and they were very high, so they have discontinued the supplement for now and will recheck levels in a few months.

## 2022-10-27 ENCOUNTER — ANCILLARY PROCEDURE (OUTPATIENT)
Dept: MAMMOGRAPHY | Facility: CLINIC | Age: 77
End: 2022-10-27
Attending: NURSE PRACTITIONER
Payer: COMMERCIAL

## 2022-10-27 DIAGNOSIS — Z12.31 ENCOUNTER FOR SCREENING MAMMOGRAM FOR MALIGNANT NEOPLASM OF BREAST: ICD-10-CM

## 2022-10-27 DIAGNOSIS — C50.919 MALIGNANT NEOPLASM OF FEMALE BREAST, UNSPECIFIED ESTROGEN RECEPTOR STATUS, UNSPECIFIED LATERALITY, UNSPECIFIED SITE OF BREAST (H): ICD-10-CM

## 2022-10-27 PROCEDURE — 77067 SCR MAMMO BI INCL CAD: CPT

## 2022-11-16 ENCOUNTER — TELEPHONE (OUTPATIENT)
Dept: NEUROLOGY | Facility: CLINIC | Age: 77
End: 2022-11-16

## 2022-11-16 NOTE — TELEPHONE ENCOUNTER
Per previous messages they wanted to come in January.  He can follow-up with her primary care doctor none of the appointments are necessary.

## 2022-11-16 NOTE — TELEPHONE ENCOUNTER
Dr. Sams-     Leandro saw patient for memory concerns in late sept. She has completed the MRI  but NP testing is not scheduled until 6/2023.     She has a f/u scheduled in January and another one scheduled for June after NP testing.     Is the January appt necessary if patient is doing ok?     Jorje Alberto RN, BSN  [facility]

## 2022-11-16 NOTE — TELEPHONE ENCOUNTER
[facility] Call Center    Phone Message    May a detailed message be left on voicemail: yes     Reason for Call:  Patients spouse called to speak to Jorje or care team regarding patients appointment in January. Jony questioning the purpose of patient's appointment and whether it is needed. Please call back at [phone]        Action Taken: Other: [facility]    Travel Screening: Not Applicable

## 2022-12-19 ENCOUNTER — OFFICE VISIT (OUTPATIENT)
Dept: NEUROLOGY | Facility: CLINIC | Age: 77
End: 2022-12-19
Payer: COMMERCIAL

## 2022-12-19 VITALS
DIASTOLIC BLOOD PRESSURE: 84 MMHG | WEIGHT: 148 LBS | SYSTOLIC BLOOD PRESSURE: 124 MMHG | BODY MASS INDEX: 25.4 KG/M2 | HEART RATE: 88 BPM | RESPIRATION RATE: 16 BRPM

## 2022-12-19 DIAGNOSIS — E53.8 LOW SERUM VITAMIN B12: Primary | ICD-10-CM

## 2022-12-19 DIAGNOSIS — G47.00 INSOMNIA, UNSPECIFIED TYPE: ICD-10-CM

## 2022-12-19 DIAGNOSIS — R41.89 SUBJECTIVE MEMORY COMPLAINTS: ICD-10-CM

## 2022-12-19 PROCEDURE — 99215 OFFICE O/P EST HI 40 MIN: CPT | Performed by: PSYCHIATRY & NEUROLOGY

## 2022-12-19 RX ORDER — IBUPROFEN 200 MG
CAPSULE ORAL
COMMUNITY

## 2022-12-19 RX ORDER — DONEPEZIL HYDROCHLORIDE 10 MG/1
10 TABLET, FILM COATED ORAL AT BEDTIME
Qty: 90 TABLET | Refills: 1 | Status: SHIPPED | OUTPATIENT
Start: 2022-12-19 | End: 2023-04-03

## 2022-12-19 NOTE — LETTER
12/19/2022         RE: Norma Paul  [address]  [facility] [address]        Dear Colleague,    Thank you for referring your patient, Norma Paul, to the [facility] [place]. Please see a copy of my visit note below.    NEUROLOGY OUTPATIENT PROGRESS NOTE   Dec 19, 2022     CHIEF COMPLAINT/REASON FOR VISIT/REASON FOR CONSULT  Patient presents with:  Follow Up    REASON FOR CONSULTATION-memory problems    REFERRAL SOURCE  Dr. Jodie Vu  CC Dr. Jodie Vu    HISTORY OF PRESENT ILLNESS  Norma Paul is a 77 year old female seen today for evaluation of cognitive patient is accompanied by .  1.  Patient symptoms started about last September.  They have progressively been getting worse.  She has more short-term issues, long-term memory issues.  Has difficulty finding words when she is trying to express herself.  Is currently not driving because of cognitive difficulties.  Has difficulty staying asleep and falling asleep.  This may be contributing to the cognitive issues.  She has bladder issues and has to get up in the middle of the night to go to the bathroom and then has difficulty falling asleep.    2.  There is no family history of dementia however there are several maternal aunts who have showing memory loss and confusion.  The patient's mother did possibly have memory problems in the later years of her life    3.  Per the family things that they have noticed:-  -Word retrieval and name recall difficulty  - Forgotten family names unable to recall past events  - Difficulty explaining and recalling where she is  - Difficulty recalling dates and people's names  - Asking the same questions over and over again  - Being anxious and knowing where certain things are kept  - Difficulty recalling how she got to a particular place  - Struggling with vocabulary and finding the right words and naming familiar objects  - Difficulty locating where she put things  - Difficulty  sequencing events    12/19/22  Patient returns today  1.  She did try the B12 supplement without benefit.  B12 was rechecked and was really elevated at 4000 and as a result the primary care doctor stopped this medication.  B12 was supposed to be rechecked early next year  2.  She did try the gabapentin at side effects.  Overall she is getting good sleep though does have issues in certain days  3.  Does have good days and bad days with her memory.  Sometimes is asking where she is and not realizing that she is at home.  The insomnia does not correlate with the good days and bad days.  4.  Neuropsychology set up in June.  Family is upset about the neuropsychology being so far away.    Previous history is reviewed and this is unchanged.    PAST MEDICAL/SURGICAL HISTORY  Past Medical History:   Diagnosis Date     Breast cancer (H) 2016    right ca     Cystocele with prolapse      Hx of radiation therapy      Hyperlipidemia      Indwelling catheter present on admission      Prolapse, uterovaginal      Patient Active Problem List   Diagnosis     Malignant neoplasm of female breast, unspecified estrogen receptor status, unspecified laterality, unspecified site of breast (H)     Urinary retention     Hyponatremia     JOE (acute kidney injury) (H)     Leukocytosis     Urinary tract infection     Uterine prolapse     Hydronephrosis     Sepsis (H)     Dehydration     Renal stone     Pelvic abscess in female     Diverticulitis of large intestine with perforation and abscess without bleeding     Hypomagnesemia     Hypokalemia     Slow transit constipation     Diverticulitis     Aromatase inhibitor use     Vaginal vault prolapse   Significant for high cholesterol, breast cancer in remission with use of radiation and chemotherapy    FAMILY HISTORY  Family History   Problem Relation Age of Onset     Breast Cancer Cousin 73.00     Cancer Cousin         Bladder     Heart Disease Mother      Hypertension Mother      Heart Disease  Father      Hypertension Father      Atrial fibrillation Sister      Thyroid Disease Sister      Skin Cancer Sister 73.00     No Known Problems Daughter      No Known Problems Son      Lung Cancer Maternal Uncle 67.00   Positive for memory loss in her mother and multiple maternal aunts    SOCIAL HISTORY  Social History     Tobacco Use     Smoking status: Never     Smokeless tobacco: Never   Vaping Use     Vaping Use: Never used   Substance Use Topics     Alcohol use: Not Currently     Drug use: No       SYSTEMS REVIEW  Twelve-system ROS was done and other than the HPI this was negative except for bladder symptoms and frequent urination, sleepiness during the daytime, confusion, anxiety, constipation  No new concerns.    MEDICATIONS  calcium carbonate 500 mg, elemental, (OSCAL 500) 1250 (500 Ca) MG TABS tablet, 1 tablet with meals Orally Twice a day  latanoprost (XALATAN) 0.005 % ophthalmic solution, Place 1 drop into both eyes daily  multivitamin (ONE A DAY) per tablet, [MULTIVITAMIN (ONE A DAY) PER TABLET] Take 1 tablet by mouth daily.  Vitamin D (Cholecalciferol) 25 MCG (1000 UT) TABS, Take 1 tablet by mouth daily  acetaminophen (TYLENOL) 500 MG tablet, [ACETAMINOPHEN (TYLENOL) 500 MG TABLET] Take 500-1,000 mg by mouth every 6 (six) hours as needed for pain. (Patient not taking: Reported on 9/28/2022)  anastrozole (ARIMIDEX) 1 MG tablet, TAKE 1 TABLET BY MOUTH DAILY GENERIC EQUIVALENT FOR ARIMIDEX (Patient not taking: Reported on 5/18/2022)  aspirin 81 MG EC tablet, [ASPIRIN 81 MG EC TABLET] Take 162 mg by mouth every evening.        (Patient not taking: Reported on 12/19/2022)  cyanocobalamin (VITAMIN B-12) 1000 MCG tablet, Take 1 tablet (1,000 mcg) by mouth daily (Patient not taking: Reported on 12/19/2022)  ibuprofen (ADVIL) 200 MG tablet, [IBUPROFEN (ADVIL) 200 MG TABLET] Take 3 tablets (600 mg total) by mouth every 6 (six) hours as needed for pain. (Patient not taking: Reported on 9/28/2022)  REPATHA  SURECLICK 140 MG/ML prefilled autoinjector,   senna-docusate (SENNOSIDES-DOCUSATE SODIUM) 8.6-50 mg tablet, [SENNA-DOCUSATE (SENNOSIDES-DOCUSATE SODIUM) 8.6-50 MG TABLET] Take 1 tablet by mouth 2 (two) times a day. To help prevent constipation post-op and while on pain medications. (Patient not taking: Reported on 9/28/2022)    No current facility-administered medications on file prior to visit.       PHYSICAL EXAMINATION  VITALS: /84   Pulse 88   Resp 16   Wt 67.1 kg (148 lb)   BMI 25.40 kg/m    GENERAL: Healthy appearing, alert, no acute distress, normal habitus.  CARDIOVASCULAR: Extremities warm and well perfused. Pulses present.   NEUROLOGICAL:  Patient is awake and partially oriented to self, place and time.  Attention span is normal.  Memory is limited; previously MoCA 8.  Language is fluent and follows commands appropriately.  Appropriate fund of knowledge. Cranial nerves 2-12 are intact. There is no pronator drift.  Motor exam shows 5/5 strength in all extremities.  Tone is symmetric bilaterally in upper and lower extremities.  (Previously reflexes are symmetric and 1+ in upper extremities and lower extremities. Sensory exam is grossly intact to light touch, pin prick and vibration.)  Finger to nose and heel to shin is without dysmetria.  Romberg is negative.  Gait is normal and the patient is able to do tandem walk and walk on toes and heels with mild difficulty.  Exam similar to before.    DIAGNOSTICS  RELEVANT LABS  Component      Latest Ref Rng & Units 7/20/2022   Sodium      136 - 145 mmol/L 143   Potassium      3.4 - 5.3 mmol/L 4.3   Creatinine      0.51 - 0.95 mg/dL 0.73   Urea Nitrogen      8.0 - 23.0 mg/dL 11.0   Chloride      98 - 107 mmol/L 105   Carbon Dioxide (CO2)      22 - 29 mmol/L 27   Anion Gap      7 - 15 mmol/L 11   Glucose      70 - 99 mg/dL 97   Calcium      8.8 - 10.2 mg/dL 9.7   Protein Total      6.4 - 8.3 g/dL 6.2 (L)   Albumin      3.5 - 5.2 g/dL 4.1   Bilirubin Total       <=1.2 mg/dL 0.6   Alkaline Phosphatase      35 - 104 U/L 76   AST      10 - 35 U/L 27   ALT      10 - 35 U/L 22   GFR Estimate      >60 mL/min/1.73m2 84   TSH      0.30 - 4.20 uIU/mL 0.83   Vitamin B12      232 - 1,245 pg/mL 324       OUTSIDE RECORDS  Outside referral notes and chart notes were reviewed and pertinent information has been summarized (in addition to the HPI):-  Cancer notes          MRI brain images reviewed.  Age-related changes noted.  IMPRESSION:  1.  No acute/subacute infarction, intracranial hemorrhage, mass effect, or hydrocephalus.  2.  Mild global brain parenchymal volume loss.      IMPRESSION/REPORT/PLAN  Low serum vitamin B12  Frequent urination  Subjective memory complaints  Insomnia, unspecified type  Rule out dementia    This is a 77 year old female with cognitive problems since September 2021.  She does score really low on the Amite today.  MRI brain is negative for structural lesions.  Blood work has shown low vitamin B12 and she has done supplementation without any benefit.  She does have significant insomnia and gabapentin was tried without benefit.  Family does not feel that the insomnia is causing her cognitive issues.    I do suspect she might have dementia and we will start her on Aricept.  Neuropsychology set up 6 months out.  Discussed the importance of keeping the neuropsychology appointment.    Patient's  had numerous questions which I tried to answer.  Explained to him that we have already checked for reversible causes of memory problems.  Would recommend exercise, healthy lifestyle and healthy diet.  Also would recommend keeping the brain active.    Return if needed.    -     cyanocobalamin (VITAMIN B-12) 1000 MCG tablet; Take 1 tablet (1,000 mcg) by mouth daily  -     MR Brain w/o Contrast; Future  -     Adult Neuropsychology Referral; Future  -     gabapentin (NEURONTIN) 300 MG capsule; Take 1 capsule (300 mg) by mouth 3 times daily    No follow-ups on file.    Over  45 minutes were spent coordinating the care for the patient on the day of the encounter.  This includes previsit, during visit and post visit activities as documented above.  Counseling patient and family.  Reviewing MRI images.  Multiple questions answered.  Prescription management.  (Activities include but not inclusive of reviewing chart, reviewing outside records, reviewing labs and imaging study results as well as the images, patient visit time including getting history and exam,  use if applicable, review of test results with the patient and coming up with a plan in a shared model, counseling patient and family, education and answering patient questions, EMR , EMR diagnosis entry and problem list management, medication reconciliation and prescription management if applicable, paperwork if applicable, printing documents and documentation of the visit activities.)        Nuno Sams MD  Neurologist  [facility] [place] [facility]  Tel:- [phone]    This note was dictated using voice recognition software.  Any grammatical or context distortions are unintentional and inherent to the software.        Again, thank you for allowing me to participate in the care of your patient.        Sincerely,        Nuno Sams MD

## 2023-01-05 ENCOUNTER — LAB REQUISITION (OUTPATIENT)
Dept: LAB | Facility: CLINIC | Age: 78
End: 2023-01-05

## 2023-01-05 DIAGNOSIS — R53.83 OTHER FATIGUE: ICD-10-CM

## 2023-01-05 DIAGNOSIS — E55.9 VITAMIN D DEFICIENCY, UNSPECIFIED: ICD-10-CM

## 2023-01-05 LAB
DEPRECATED CALCIDIOL+CALCIFEROL SERPL-MC: 78 UG/L (ref 20–75)
FOLATE SERPL-MCNC: 31.5 NG/ML (ref 4.6–34.8)
VIT B12 SERPL-MCNC: 833 PG/ML (ref 232–1245)

## 2023-01-05 PROCEDURE — 82607 VITAMIN B-12: CPT | Performed by: FAMILY MEDICINE

## 2023-01-05 PROCEDURE — 87086 URINE CULTURE/COLONY COUNT: CPT | Performed by: FAMILY MEDICINE

## 2023-01-05 PROCEDURE — 82306 VITAMIN D 25 HYDROXY: CPT | Performed by: FAMILY MEDICINE

## 2023-01-05 PROCEDURE — 82746 ASSAY OF FOLIC ACID SERUM: CPT | Performed by: FAMILY MEDICINE

## 2023-01-07 LAB — BACTERIA UR CULT: NO GROWTH

## 2023-03-17 ENCOUNTER — OFFICE VISIT (OUTPATIENT)
Dept: NEUROLOGY | Facility: CLINIC | Age: 78
End: 2023-03-17
Payer: COMMERCIAL

## 2023-03-17 DIAGNOSIS — F02.80 MAJOR NEUROCOGNITIVE DISORDER DUE TO ALZHEIMER'S DISEASE (H): Primary | ICD-10-CM

## 2023-03-17 DIAGNOSIS — G30.9 MAJOR NEUROCOGNITIVE DISORDER DUE TO ALZHEIMER'S DISEASE (H): Primary | ICD-10-CM

## 2023-03-17 PROCEDURE — 96136 PSYCL/NRPSYC TST PHY/QHP 1ST: CPT | Performed by: CLINICAL NEUROPSYCHOLOGIST

## 2023-03-17 PROCEDURE — 96116 NUBHVL XM PHYS/QHP 1ST HR: CPT | Performed by: CLINICAL NEUROPSYCHOLOGIST

## 2023-03-17 PROCEDURE — 96133 NRPSYC TST EVAL PHYS/QHP EA: CPT | Performed by: CLINICAL NEUROPSYCHOLOGIST

## 2023-03-17 PROCEDURE — 96137 PSYCL/NRPSYC TST PHY/QHP EA: CPT | Performed by: CLINICAL NEUROPSYCHOLOGIST

## 2023-03-17 PROCEDURE — 96132 NRPSYC TST EVAL PHYS/QHP 1ST: CPT | Performed by: CLINICAL NEUROPSYCHOLOGIST

## 2023-03-17 NOTE — PROGRESS NOTES
NEUROPSYCHOLOGY CONSULT  [facility]    NAME: Norma Paul    YOB: 1945   AGE: 78  EDU: 16  DATE OF EVALUATION: 3/17/2023    REASON FOR REFERRAL:  Ms. Paul is a 78 year old, right-handed, White female presenting with concerns about cognitive functioning in the context of breast cancer (s/p right lumpectomy in 2016). She was referred for a neurocognitive evaluation by her neurologist, Dr. Sams from [facility] to assist with differential diagnosis and care planning.     DIAGNOSTIC SUMMARY:  Due to the current COVID-19 pandemic that limits contact during in-person clinical visits, the testing portion of this assessment was conducted using face-to-face methods with PPE worn by the examiner and a face-mask for the patient. The standard administration of these tests involves in-person, direct face-to-face methods. The full impact of applying non-standard administration methods with PPE is not fully appreciated at this time. The diagnostic conclusions and recommendations provided in this report are being advanced with caution.    With these limitations in mind, results of testing indicate that Ms. Paul is a woman of estimated average premorbid intellectual functioning whose performance is notable for borderline impaired prose learning, mild to moderately impaired semantic retrieval skills (confrontation naming, semantic fluency, fund of knowledge), moderately impaired inhibition, and severely impaired complex attention, rote verbal learning, and verbal memory. In addition, some variability was evident on measures of cognitive efficiency (ranging from mildly impaired to high average) and executive functioning (ranging from severely impaired to average). These weaknesses were evident in the context of otherwise intact performance on measures of basic attention, aspects of language (sight word reading, verbal abstraction),  visuospatial reasoning skills and aspects of executive functioning (phonemic fluency, visuospatial planning and organization). Finally, on a self-report questionnaire, she denied any significant symptoms of depression.     I was able to share the above results along with my impressions and recommendations (see below) with Ms. Paul and her  on the day of testing. I provided the opportunity for them to ask questions about the evaluation and results. At the end of our meeting, they indicated that they understood the results and that I had answered all of their questions. They were encouraged to reach out to me (contact information included in the AVS) should any further questions or concerns arise in the future. (Ms. Paul provided verbal consent for me to talk to Jony if he should call with questions.) I explained that I would send the impressions and recommendations from the report as part of the After Visit Summary (which will be directed to clinic staff to print and send by mail) and that Dr. Sams would be receiving the full report as the consulting provider as would her PCP.     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 Paul received the results of this evaluation on the day of testing.     Thank you for allowing me to participate in Ms. Paul's care.  Please contact me with any questions regarding the content of this report.      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 Paul stop driving. If she would like to continue driving, a formal driving evaluation is strongly recommended. Possible options for formal driving evaluations would be: [facility] ([phone]), [facility] ([phone]) or any option recommended by his physician.     Ms. Paul would benefit from oversight in her daily activities particularly in terms of managing medications (i.e., having her  check that her pillbox is set up correctly and making sure that she is taking her medications regularly and as prescribed).     It is recommended that Ms. Paul only cook or perform other potentially dangerous activities when her  is present.    It is strongly recommended that a family member or close friend accompany Ms. Paul to all appointments to ensure that accurate information is given to providers and instructions are followed. It will be helpful to present the information in brief, repeated segments and have her repeat back the information in  her own words to ensure understanding. But ultimately, her caregivers should be in charge of following through with any recommendations.     If not already done, completion of paperwork for advance directives and assignment of healthcare and financial power of  should be considered at this time.    To the extent that her  can continue to provide support and assistance, it seems that there current living situation is working for them.  However, if more support is needed in the future or if her  is unable to provide that support, Ms. Paul would benefit from increased supervision and support in her daily life so as to help her manage daily tasks such as cooking and cleaning as well as keeping track of medications and to ensure her personal safety. An assisted living facility would be ideal so as to allow her some independence while also providing a structured and supportive environment.    Family Resources    It will be increasingly important for Ms. Paul's family to have a strong support network in place to both aid in her care and to allow her  opportunities for personal time. The Alzheimer s Association (http://www.alz.org/mnnd 0-582-561-1211) has information about local support groups as well as informational materials and a 24 hour hotline.   Physical and Emotional Health    Ms. Paul will continue to benefit from the use of cognitive enhancing medications     It is important that Ms. Paul continue to adhere to her medication treatment regimen and follow a healthy diet so as to maintain her physical health, as this can have a significant impact on her physical, emotional, and cognitive functioning.     Under the guidance of her physician, It is recommended that regular exercise be integrated into Ms. Paul's routine as it will likely benefit her cognitively and emotionally as well as physically.     Given her history of insomnia, Ms. Paul may benefit from evaluation in  Sleep Medicine to determine if they have any medication or other interventions that might be helpful. She should speak with her physician or Neurologist to determine if they feel this would be appropriate.    Ms. Paul does not appear to be struggling with any significant emotional symptoms. Behavioral activation techniques such as regular exercise (under the guidance of her physician), recreational activities and regular social interaction would likely be effective in helping Ms. Paul to continue to maintain her mood.   Memory and Organization    Ms. Paul is encouraged to continue to engage in stimulating activities, (i.e., reading, card games, puzzles) to keep her cognitively active.     In her daily life, Ms. Paul will continue to benefit from the use of compensation techniques. That is, she may find it helpful to post reminder notes around the house, make lists, and carry a small calendar so that she can feel more comfortable and confident in her ability to remember information. A daily planner could also be used as a memory book where important information is recorded and organized for future reference.     Ms. Paul should also create a system to establish set locations for certain items (i.e., keys) such that she always knows where to put them upon entering the house and where to look when she needs them. If she would like to keep certain items out of sight (i.e., a wallet), she could set up a specific hidden place to keep items and use that same place so as to ensure she can find the required item when needed.     Ms. Paul will do best in an environment where a lot of routines are established so that she can follow a regular pattern for her daily or weekly routines. She may  become confused when deviating from this routine.     Ms. Paul demonstrates intact basic attention but profound memory impairments, thus, she should not be given instructions for tasks any more than a few minutes in  "the future.    Ms. Paul should be aware that she may not be able to process information as quickly and efficiently as she once could. Thus she should allow herself extra time to complete tasks and not try and work under extreme time constraints.   Follow-up    Given the severity of Ms. Paul's impairments, neuropsychological re-evaluation may not be helpful in clarifying etiology. As such, future testing is not recommended unless clinically indicated by her care team.     --------------------------------EXTENDED REPORT--------------------------------  Verbal consent for neuropsychological testing was received following the provision of information about the nature of the evaluation, and the opportunity to ask questions.     HISTORY OF PRESENTING PROBLEM:    Relevant History  Ms. Paul was initially seen by Dr Sams in Neurology on 9/28/22. At that time, the family was reporting a one year history of cognitive decline. She scored a 8/30 on the MoCA. Dr Sams recommended MRI of the brain, B12 supplements and gabapentin for severe insomnia as well as Neuropsychological evaluation. In a follow-up with Dr. Sams on 12/19/22, MRI of the brain was described as negative for structural lesions, B12 supplements were not helpful and discontinued as her levels actually were too high. She did not obtain benefit from gabapentin either and family did not feel that insomnia was related to cognitive issues. Dr. Sams initiated Aricept for likely dementia noting that reversible causes of memory problems were ruled out.     Current Interview  Ms. Paul was accompanied by her  Jony and was a variable historian. Together they provided the following information.     At the present time, Ms. Paul reports she occasionally experiences memory problems but added that she is only aware this is an issue because \"he tells me,\" referring to her  often commenting that she forgets conversations.  But she " "otherwise denied problems with misplacing items, being forgetful of names of places or objects or other cognitive concerns such as word finding difficulties with language comprehension, attention problems or speed of thinking.  She was able to describe the ongoing COVID pandemic but estimated that its been going on for \"maybe 1 year,\" (actually 3).    Jony shared that he feels his wife is forgetful of short-term information but he feels it is related to her sleep problems or sleep pattern.  He notes that her memory is problematic when she does not sleep well.  He gave the example of some forgetfulness today when she was asking repeatedly what time the appointment is and what it is for even though he has told her multiple times.  But in general he feels that her memory \"ebbs and flows.\"  He stated he first noticed problems about a year ago.  That said he does not feel her memory is getting worse over time.  He did state that she began donepezil in December and feels that she has been doing better.  He has not noticed any problems with other cognitive skills.  He does actually wonder if the donepezil is contributing to some of her sleep problems as he feels that she is waking up at night now in the middle of the night when she did not before.  (Per records she had problems with insomnia before donepezil was started).    With regard to the activities of daily living, Ms. Paul reported that she lives with her  in a home where they have been for 20 years.  She noted that she used to do more cooking but her  is helping more because he is cooking more things for himself that he wants.  Jony later confirmed this.  He denied her having significant problems with cooking other than 1 time she forgot to turn the burner off but it happened right away and there were no problems.  Ms. Paul shared that they both track appointments on a calendar and her  agreed with this noting that she is good about " writing things down and checking the calendar regularly.  Ms. Paul stated that she sets up her own pillbox and remembers to take them.  Her  agreed noting that she only takes 2 prescription medications but also includes her vitamins and is good about setting up her pillbox and does remember to take them.  Ms. Paul indicated that she is not driving very much because she does not like to drive in the winter and estimated that she last drove about a month ago.  Jony agreed with this noting that she rarely drives in the winter and even when it is not winter he does do the majority of the driving.  Ms. Paul stated that her  has always managed the family finances and he agreed.    MEDICAL HISTORY:  Ms. Paul's medical history is significant for   Past Medical History:   Diagnosis Date     Breast cancer (H) 2016    right ca     Cystocele with prolapse      Hx of radiation therapy      Hyperlipidemia      Indwelling catheter present on admission      Prolapse, uterovaginal      Ms. Paul's current problem list includes   Patient Active Problem List   Diagnosis     Malignant neoplasm of female breast, unspecified estrogen receptor status, unspecified laterality, unspecified site of breast (H)     Urinary retention     Hyponatremia     JOE (acute kidney injury) (H)     Leukocytosis     Urinary tract infection     Uterine prolapse     Hydronephrosis     Sepsis (H)     Dehydration     Renal stone     Pelvic abscess in female     Diverticulitis of large intestine with perforation and abscess without bleeding     Hypomagnesemia     Hypokalemia     Slow transit constipation     Diverticulitis     Aromatase inhibitor use     Vaginal vault prolapse     Ms. Paul denied any history of stroke, seizure or head injury with loss of consciousness. She denied having tested positive for COVID or experiencing an illness concerning for COVID.  Her  agreed with this information.    With regard to exercise  Ms. Paul denied any regular exercise but acknowledging she has been told that she should be doing that.    Ms. Paul denied any significant sensory changes or disturbance in appetite.  She did acknowledge that she has some trouble with sleep noting that she falls asleep easily enough but wakes up in the middle of the night to use the bathroom and then cannot return to sleep.  As noted above, Jony wonders if this is related to donepezil.  Ms. Paul noted that she is often rested in the mornings but this morning she did feel tired. Jony stated that she does not snore and denied any unusual sleep behaviors.  She has never been to sleep medicine.  They did try gabapentin to help with sleep but it made her too groggy.    Diagnostic studies:  A recent MRI of the brain dated 10/6/2022 revealed:      IMPRESSION:  1.  No acute/subacute infarction, intracranial hemorrhage, mass effect, or hydrocephalus.  2.  Mild global brain parenchymal volume loss.    Past Surgical History:   Procedure Laterality Date     ABDOMEN SURGERY       BIOPSY BREAST Right       SECTION       COLON SURGERY      sigmoidectomy for diverticulitis     COLONOSCOPY N/A 2019    Procedure: COLONOSCOPY;  Surgeon: Shukri Matute MD;  Location: [facility];  Service: General     CYSTOSCOPY N/A 2019    Procedure: CYSTOSCOPY;  Surgeon: Berna Carter MD;  Location: [facility];  Service: Urology     EYE SURGERY      bilateral cataract surgery     HYSTERECTOMY       LUMPECTOMY BREAST Right 10/27/2016    DR. LILI TELLES LAP,SURG,COLECTOMY, PARTIAL, W/ANAST N/A 2019    Procedure: COLECTOMY, SIGMOID, LAPAROSCOPIC;  Surgeon: Shukri Matute MD;  Location: [facility];  Service: General     VITRECTOMY ANTERIOR Left 2009    retinal reattachment     VITRECTOMY ANTERIOR Left      Current medications include (per medical record):   Current Outpatient Medications:      acetaminophen (TYLENOL)  500 MG tablet, [ACETAMINOPHEN (TYLENOL) 500 MG TABLET] Take 500-1,000 mg by mouth every 6 (six) hours as needed for pain. (Patient not taking: Reported on 9/28/2022), Disp: , Rfl:      anastrozole (ARIMIDEX) 1 MG tablet, TAKE 1 TABLET BY MOUTH DAILY GENERIC EQUIVALENT FOR ARIMIDEX (Patient not taking: Reported on 5/18/2022), Disp: 90 tablet, Rfl: 0     aspirin 81 MG EC tablet, [ASPIRIN 81 MG EC TABLET] Take 162 mg by mouth every evening.        (Patient not taking: Reported on 12/19/2022), Disp: , Rfl:      calcium carbonate 500 mg, elemental, (OSCAL 500) 1250 (500 Ca) MG TABS tablet, 1 tablet with meals Orally Twice a day, Disp: , Rfl:      cyanocobalamin (VITAMIN B-12) 1000 MCG tablet, Take 1 tablet (1,000 mcg) by mouth daily (Patient not taking: Reported on 12/19/2022), Disp: 90 tablet, Rfl: 1     donepezil (ARICEPT) 10 MG tablet, Take 1 tablet (10 mg) by mouth At Bedtime, Disp: 90 tablet, Rfl: 1     ibuprofen (ADVIL) 200 MG tablet, [IBUPROFEN (ADVIL) 200 MG TABLET] Take 3 tablets (600 mg total) by mouth every 6 (six) hours as needed for pain. (Patient not taking: Reported on 9/28/2022), Disp: , Rfl: 0     latanoprost (XALATAN) 0.005 % ophthalmic solution, Place 1 drop into both eyes daily, Disp: , Rfl:      multivitamin (ONE A DAY) per tablet, [MULTIVITAMIN (ONE A DAY) PER TABLET] Take 1 tablet by mouth daily., Disp: , Rfl:      REPATHA SURECLICK 140 MG/ML prefilled autoinjector, , Disp: , Rfl:      senna-docusate (SENNOSIDES-DOCUSATE SODIUM) 8.6-50 mg tablet, [SENNA-DOCUSATE (SENNOSIDES-DOCUSATE SODIUM) 8.6-50 MG TABLET] Take 1 tablet by mouth 2 (two) times a day. To help prevent constipation post-op and while on pain medications. (Patient not taking: Reported on 9/28/2022), Disp: , Rfl: 0     Vitamin D (Cholecalciferol) 25 MCG (1000 UT) TABS, Take 1 tablet by mouth daily, Disp: , Rfl: .    FAMILY HISTORY:   Family medical history is significant for:   Family History   Problem Relation Age of Onset     Breast  "Cancer Cousin 73.00     Cancer Cousin         Bladder     Heart Disease Mother      Hypertension Mother      Heart Disease Father      Hypertension Father      Atrial fibrillation Sister      Thyroid Disease Sister      Skin Cancer Sister 73.00     No Known Problems Daughter      No Known Problems Son      Lung Cancer Maternal Uncle 67.00   Ms. Paul stated that her mother was forgetful but does not know if she was ever diagnosed with dementia or Alzheimer's.  They were otherwise unaware of any neurologic or neurodegenerative conditions in the family.    PSYCHIATRIC AND SUBSTANCE USE HISTORY:  With regard to her psychiatric history, Ms. Paul denied a history of past psychiatric conditions or mental health treatment.  At the current time, she described her mood as okay.  Jony actually described her mood as \"great.\"  She denied any significant depressive or anxiety symptoms and her  agreed.  She denied any suicidal ideation, plan or intent or hallucinations or delusions.     With regard to substance use, Ms. Paul indicated that she has never really been a big drinker and tends to only drink now on holidays or special occasions.  Even then she does not usually even have a whole drink.  Jony agreed with this information.  She denied any history of problematic alcohol use and Jony agreed.  She denied any history of tobacco or recreational drug use.    SOCIAL HISTORY:  Ms. Paul was born and raised in [place] and grew up in the [place] area. She was unaware of any complications in her mother's pregnancy with her or in her birth, or delays in reaching developmental milestones. She denied a history of early learning or attention difficulties, individualized instruction, or grade repetition.  She described herself as an average student earning C's and B's in high school and graduated on time with her class.  She went on to the [employer] and earned a bachelor's in art and " education.  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  She never returned to work outside the home.      Ms. Paul was able to correctly report that she has 2 children (1 girl) and 2 granddaughters (both from her daughter as her son has no children).  She stated that she has been  for 35 years (actually 46 years per ).     BEHAVIORAL OBSERVATIONS:   Ms. Paul arrived 10 minutes early and accompanied by her  Jony to today's appointment. She was appropriately dressed and groomed. She was alert and engaged during the interview. Gait was unremarkable. Her mood was euthmyic and her affect was appropriately reactive. Rapport was easily established and eye contact was unremarkable. She was pleasant and cooperative. Rate, prosody, and content of speech were grossly normal. No significant word finding difficulties or paraphasic errors were evident. There was no evidence of a olegario thought disorder; no hallucinations or delusions were apparent. Judgment and insight appeared fair.       Ms. Paul appeared adequately motivated and engaged easily in the testing component of the evaluation. She attempted all tasks presented to her and worked at a steady pace. She did not appear overly frustrated by difficult or challenging tasks and responded appropriately to encouragement.  No significant barriers to testing were observed and the following is judged to be a valid representation of Ms. Paul's current cognitive strengths and weaknesses.    LIMITATIONS:  Due to circumstances that limit contact during in-person clinical visits, this assessment was conducted using face-to-face testing with the examiner wearing [employer] [place] designated PPE and the patient wearing a face mask. The standard administration of these procedures involves in-person, face-to-face methods without PPE. The impact of applying non-standard  administration methods has been evaluated only in part by scientific research. While every effort was made to simulate standard assessment practices, the diagnostic conclusions and recommendations for treatment provided in this report are being advanced with these limitations in mind.    TESTS ADMINISTERED:   [place] Naming Test (BNT), Category Fluency- AFV (CAT), Clock Drawing, Controlled Oral Word Association Test CFL (COWAT), Geriatric Depression Scale 30 (GDS), Webster Verbal Learning Test - R Form 1 (HVLT-R), Jakob-Osterrieth Complex Figure Test, copy only (RCFT), Stroop Color and Word Test, Trail Making Test (TMT), WAIS-IV (Similarities, Information, Block Design, Matrix Reasoning, Digit Span), WMS-IV Logical Memory, WRAT-4 Word Reading (blue), and WMS-III Information and Orientation.    MOANS norms were used for BNT, CAT, COWAT, RCFT, Stroop, TMT  (Raw scores in parentheses)    DESCRIPTIVE PERFORMANCE KEY:    Labels for tests with Normal Distributions  Score Label Standard Score %ile Rank   Exceptionally high score  > 130 > 98   Above average score 120-129 91-97   High average score 110-119 75-90   Average score  25-74   Low average score 80-89 9-24   Below average score 70-79 2-8   Exceptionally low score < 70 < 2     Labels for tests with Non-Normal Distributions  Score Label %ile Rank   Within normal expectations/ limits score (WNL) > 24   Low average score 9-24   Below average score 2-8   Exceptionally low score < 2     The following test results utilize score labels as adapted from Fabrizio Rajan, Luis Chi, Carmela Cazares, STEPHEN Bedolla, Barb Phillips, Harry Mccauley & Conference Participants (2020): American Academy of Clinical Neuropsychology consensus conference statement on uniform labeling of performance test scores, The Clinical Neuropsychologist, DOI: [id number]    All scores contain some measure of error; scores are reported  "here as they are obtained by the individual (without reference to the range of error). These are meant as labels and not interpretation of performance. While other relevant comments regarding task performance are provided below, please see the Assessment, Impressions and Diagnostic Summary sections of this report for interpretation of the scores and the cognitive profile as a whole, including what does and does not constitute impairment.    OPTIMAL PREMORBID INTELLECT:  Optimal premorbid intellectual abilities were estimated as falling in the average range based on Ms. Paul's educational and occupational histories and performance on tasks least likely to be affected by acquired brain dysfunction (i.e.,  hold tests : WRAT = 59).    SUMMARY OF TEST RESULTS:     Orientation, Attention and Processing Speed  Mental status exam was measured as a below average score for her age (7). She was oriented only to personal information and month. She stated that it was Thursday the 11th (actually Friday the 17th) in 2002 (actually 2023). [place] could provide no further information. She was over 90 minutes off when estimating the time and was unable to name the current or previous presidents.    Performance on a measure of basic attention and working memory was assessed as a below average score (15). This reflected a low average score for basic attention skills (LDF = 5) and working memory scores that ranged from a below average score (LDB = 2) to a low average score (LDS = 3).    A simple sequencing task (35\" ) was assessed as a high average score. A speeded word reading task (55) was assessed as a low average score. A speeded color naming task (39) was assessed as a below average score.     Language  Sight word reading skills (59) were assessed as an average score. Verbal abstraction skills (20) were assessed as an average score. Fund of general knowledge (4) was assessed as a " below average score. Her performance on a measure of semantic fluency was measured as a below average score (18). Confrontation naming was measured as a below average to low average score (40).    Visuospatial Skills  Performance on a block construction task (29) resulted in an average score. Performance on a pattern completion task (7) was measured as an average score.    Learning and Memory  Immediate memory for two short stories was measured as a low average score (19). Delayed recall of these stories resulted in an exceptionally low score (1, 9% retention). Recognition memory was measured as an exceptionally low score (9/23). She was also administered a measure of rote auditory verbal list learning that required her to learn a series of 12 words over three trials and retain and recall them over a delay. Her initial rate of learning (2,2,5) reflected an exceptionally low score. She retained and recalled 0 words (0% retention) after the delay for an exceptionally low score for delayed recall.  Recognition memory was measured as an exceptionally low score as she correctly identified 7 of the original words and made 4 false positive errors.     Executive Functioning  Working memory skills ranged from a below average score (LDB = 2) to a low average score (LDS = 3). A complex sequencing and set shifting task was discontinued at I (at 6 minutes) and notable for 5 errors. Her performance on a measure of phonemic fluency resulted in an average score (32). Her ability to inhibit an over-learned response was assessed as a below average score (12). Her performance on this task was notable for 3 errors. Her copy of a complex figure was performed as an average score for her age (29) and notable for mild difficulty integrating detail. Her drawing of a clock was notable for initially drawing the hands without having them meet in the center, but then she corrected this.     Mood  On the Geriatric Depression Scale-30 (GDS), a  self report measure of depressive symptomatology, she obtained a score of 0, placing her in the range of no significant symptoms of depression.     EVALUATION SERVICES AND TIME:   A clinical interview/neurobehavioral status examination was conducted with the patient and documented. I thoroughly reviewed the medical record, selected the neuropsychological test battery, provided supervision to the individual who administered and scored the neuropsychological test battery, interpreted/integrated patient data and test results, engaged in clinical decision making, treatment planning, report writing/preparation and provided and documented interactive feedback of test results on the day of testing. I (Psychologist) directly administered and scored 2+ neuropsychological tests. Please see below for a breakdown of time spent and the associated codes billed for these services.     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 Paul has a history of breast cancer (s/p right lumpectomy in 2016) and was referred for an evaluation of Subjective Memory Complaints. Feedback of results was provided on the day of testing.       Cherry Whitaker, PhD, LP, ABPP  Clinical Neuropsychologist, [id number]  Board Certified in Clinical Neuropsychology    [facility] [place] [address], [address]  [place] [address]  Phone:  [phone]

## 2023-03-17 NOTE — LETTER
3/17/2023         RE: Norma Paul  [address]  [facility] [address]        Dear Colleague,    Thank you for referring your patient, Norma Paul, to the [facility] [place] [facility] [place] Please see a copy of my visit note below.    NEUROPSYCHOLOGY CONSULT  [facility]    NAME: Norma Paul    YOB: 1945   AGE: 78  EDU: 16  DATE OF EVALUATION: 3/17/2023    REASON FOR REFERRAL:  Ms. Paul is a 78 year old, right-handed, White female presenting with concerns about cognitive functioning in the context of breast cancer (s/p right lumpectomy in 2016). She was referred for a neurocognitive evaluation by her neurologist, Dr. Sams from [facility] to assist with differential diagnosis and care planning.     DIAGNOSTIC SUMMARY:  Due to the current COVID-19 pandemic that limits contact during in-person clinical visits, the testing portion of this assessment was conducted using face-to-face methods with PPE worn by the examiner and a face-mask for the patient. The standard administration of these tests involves in-person, direct face-to-face methods. The full impact of applying non-standard administration methods with PPE is not fully appreciated at this time. The diagnostic conclusions and recommendations provided in this report are being advanced with caution.    With these limitations in mind, results of testing indicate that Ms. Paul is a woman of estimated average premorbid intellectual functioning whose performance is notable for borderline impaired prose learning, mild to moderately impaired semantic retrieval skills (confrontation naming, semantic fluency, fund of knowledge), moderately impaired inhibition, and severely impaired complex attention, rote verbal learning, and verbal memory. In addition, some variability was evident on measures of cognitive efficiency (ranging from mildly impaired to high  average) and executive functioning (ranging from severely impaired to average). These weaknesses were evident in the context of otherwise intact performance on measures of basic attention, aspects of language (sight word reading, verbal abstraction), visuospatial reasoning skills and aspects of executive functioning (phonemic fluency, visuospatial planning and organization). Finally, on a self-report questionnaire, she denied any significant symptoms of depression.     I was able to share the above results along with my impressions and recommendations (see below) with Ms. Paul and her  on the day of testing. I provided the opportunity for them to ask questions about the evaluation and results. At the end of our meeting, they indicated that they understood the results and that I had answered all of their questions. They were encouraged to reach out to me (contact information included in the AVS) should any further questions or concerns arise in the future. (Ms. Paul provided verbal consent for me to talk to Jony if he should call with questions.) I explained that I would send the impressions and recommendations from the report as part of the After Visit Summary (which will be directed to clinic staff to print and send by mail) and that Dr. Sams would be receiving the full report as the consulting provider as would her PCP.     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 Paul received the results of this evaluation on the day of testing.     Thank you for allowing me to participate in Ms. Paul's care.  Please contact me with any questions regarding the content of this report.      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 Paul stop driving. If she would like to continue driving, a formal driving evaluation is strongly recommended. Possible options for formal driving evaluations would be: [facility] ([phone]), [facility] ([phone]) or any option recommended by his physician.     Ms. Paul would benefit from oversight in her daily activities particularly in terms of managing medications (i.e., having her  check that her pillbox is set up correctly and making sure that she is taking her medications regularly and as prescribed).     It is recommended that Ms. Paul only cook or perform other potentially dangerous activities when her  is present.    It is strongly recommended that a family member or close friend  accompany Ms. Paul to all appointments to ensure that accurate information is given to providers and instructions are followed. It will be helpful to present the information in brief, repeated segments and have her repeat back the information in her own words to ensure understanding. But ultimately, her caregivers should be in charge of following through with any recommendations.     If not already done, completion of paperwork for advance directives and assignment of healthcare and financial power of  should be considered at this time.    To the extent that her  can continue to provide support and assistance, it seems that there current living situation is working for them.  However, if more support is needed in the future or if her  is unable to provide that support, Ms. Paul would benefit from increased supervision and support in her daily life so as to help her manage daily tasks such as cooking and cleaning as well as keeping track of medications and to ensure her personal safety. An assisted living facility would be ideal so as to allow her some independence while also providing a structured and supportive environment.    Family Resources    It will be increasingly important for Ms. Paul's family to have a strong support network in place to both aid in her care and to allow her  opportunities for personal time. The Alzheimer s Association (http://www.alz.org/mnnd 7-721-212-4317) has information about local support groups as well as informational materials and a 24 hour hotline.   Physical and Emotional Health    Ms. Paul will continue to benefit from the use of cognitive enhancing medications     It is important that Ms. Paul continue to adhere to her medication treatment regimen and follow a healthy diet so as to maintain her physical health, as this can have a significant impact on her physical, emotional, and cognitive functioning.     Under the guidance of her  physician, It is recommended that regular exercise be integrated into Ms. Paul's routine as it will likely benefit her cognitively and emotionally as well as physically.     Given her history of insomnia, Ms. Paul may benefit from evaluation in Sleep Medicine to determine if they have any medication or other interventions that might be helpful. She should speak with her physician or Neurologist to determine if they feel this would be appropriate.    Ms. Paul does not appear to be struggling with any significant emotional symptoms. Behavioral activation techniques such as regular exercise (under the guidance of her physician), recreational activities and regular social interaction would likely be effective in helping Ms. Paul to continue to maintain her mood.   Memory and Organization    Ms. Paul is encouraged to continue to engage in stimulating activities, (i.e., reading, card games, puzzles) to keep her cognitively active.     In her daily life, Ms. Paul will continue to benefit from the use of compensation techniques. That is, she may find it helpful to post reminder notes around the house, make lists, and carry a small calendar so that she can feel more comfortable and confident in her ability to remember information. A daily planner could also be used as a memory book where important information is recorded and organized for future reference.     Ms. Paul should also create a system to establish set locations for certain items (i.e., keys) such that she always knows where to put them upon entering the house and where to look when she needs them. If she would like to keep certain items out of sight (i.e., a wallet), she could set up a specific hidden place to keep items and use that same place so as to ensure she can find the required item when needed.     Ms. Paul will do best in an environment where a lot of routines are established so that she can follow a regular pattern for her daily  or weekly routines. She may  become confused when deviating from this routine.     Ms. Paul demonstrates intact basic attention but profound memory impairments, thus, she should not be given instructions for tasks any more than a few minutes in the future.    Ms. Paul should be aware that she may not be able to process information as quickly and efficiently as she once could. Thus she should allow herself extra time to complete tasks and not try and work under extreme time constraints.   Follow-up    Given the severity of Ms. Paul's impairments, neuropsychological re-evaluation may not be helpful in clarifying etiology. As such, future testing is not recommended unless clinically indicated by her care team.     --------------------------------EXTENDED REPORT--------------------------------  Verbal consent for neuropsychological testing was received following the provision of information about the nature of the evaluation, and the opportunity to ask questions.     HISTORY OF PRESENTING PROBLEM:    Relevant History  Ms. Palu was initially seen by Dr Sams in Neurology on 9/28/22. At that time, the family was reporting a one year history of cognitive decline. She scored a 8/30 on the MoCA. Dr Sams recommended MRI of the brain, B12 supplements and gabapentin for severe insomnia as well as Neuropsychological evaluation. In a follow-up with Dr. Sams on 12/19/22, MRI of the brain was described as negative for structural lesions, B12 supplements were not helpful and discontinued as her levels actually were too high. She did not obtain benefit from gabapentin either and family did not feel that insomnia was related to cognitive issues. Dr. Sams initiated Aricept for likely dementia noting that reversible causes of memory problems were ruled out.     Current Interview  Ms. Paul was accompanied by her  Jony and was a variable historian. Together they provided the following information.  "    At the present time, Ms. Paul reports she occasionally experiences memory problems but added that she is only aware this is an issue because \"he tells me,\" referring to her  often commenting that she forgets conversations.  But she otherwise denied problems with misplacing items, being forgetful of names of places or objects or other cognitive concerns such as word finding difficulties with language comprehension, attention problems or speed of thinking.  She was able to describe the ongoing COVID pandemic but estimated that its been going on for \"maybe 1 year,\" (actually 3).    Jony shared that he feels his wife is forgetful of short-term information but he feels it is related to her sleep problems or sleep pattern.  He notes that her memory is problematic when she does not sleep well.  He gave the example of some forgetfulness today when she was asking repeatedly what time the appointment is and what it is for even though he has told her multiple times.  But in general he feels that her memory \"ebbs and flows.\"  He stated he first noticed problems about a year ago.  That said he does not feel her memory is getting worse over time.  He did state that she began donepezil in December and feels that she has been doing better.  He has not noticed any problems with other cognitive skills.  He does actually wonder if the donepezil is contributing to some of her sleep problems as he feels that she is waking up at night now in the middle of the night when she did not before.  (Per records she had problems with insomnia before donepezil was started).    With regard to the activities of daily living, Ms. Paul reported that she lives with her  in a home where they have been for 20 years.  She noted that she used to do more cooking but her  is helping more because he is cooking more things for himself that he wants.  Jony later confirmed this.  He denied her having significant problems with " cooking other than 1 time she forgot to turn the burner off but it happened right away and there were no problems.  Ms. Paul shared that they both track appointments on a calendar and her  agreed with this noting that she is good about writing things down and checking the calendar regularly.  Ms. Paul stated that she sets up her own pillbox and remembers to take them.  Her  agreed noting that she only takes 2 prescription medications but also includes her vitamins and is good about setting up her pillbox and does remember to take them.  Ms. Paul indicated that she is not driving very much because she does not like to drive in the winter and estimated that she last drove about a month ago.  Jony agreed with this noting that she rarely drives in the winter and even when it is not winter he does do the majority of the driving.  Ms. Paul stated that her  has always managed the family finances and he agreed.    MEDICAL HISTORY:  Ms. Paul's medical history is significant for   Past Medical History:   Diagnosis Date     Breast cancer (H) 2016    right ca     Cystocele with prolapse      Hx of radiation therapy      Hyperlipidemia      Indwelling catheter present on admission      Prolapse, uterovaginal      Ms. Lin current problem list includes   Patient Active Problem List   Diagnosis     Malignant neoplasm of female breast, unspecified estrogen receptor status, unspecified laterality, unspecified site of breast (H)     Urinary retention     Hyponatremia     JOE (acute kidney injury) (H)     Leukocytosis     Urinary tract infection     Uterine prolapse     Hydronephrosis     Sepsis (H)     Dehydration     Renal stone     Pelvic abscess in female     Diverticulitis of large intestine with perforation and abscess without bleeding     Hypomagnesemia     Hypokalemia     Slow transit constipation     Diverticulitis     Aromatase inhibitor use     Vaginal vault prolapse     Ms.  Beverly denied any history of stroke, seizure or head injury with loss of consciousness. She denied having tested positive for COVID or experiencing an illness concerning for COVID.  Her  agreed with this information.    With regard to exercise Ms. Paul denied any regular exercise but acknowledging she has been told that she should be doing that.    Ms. Paul denied any significant sensory changes or disturbance in appetite.  She did acknowledge that she has some trouble with sleep noting that she falls asleep easily enough but wakes up in the middle of the night to use the bathroom and then cannot return to sleep.  As noted above, Jony wonders if this is related to donepezil.  Ms. Paul noted that she is often rested in the mornings but this morning she did feel tired. Jony stated that she does not snore and denied any unusual sleep behaviors.  She has never been to sleep medicine.  They did try gabapentin to help with sleep but it made her too groggy.    Diagnostic studies:  A recent MRI of the brain dated 10/6/2022 revealed:      IMPRESSION:  1.  No acute/subacute infarction, intracranial hemorrhage, mass effect, or hydrocephalus.  2.  Mild global brain parenchymal volume loss.    Past Surgical History:   Procedure Laterality Date     ABDOMEN SURGERY       BIOPSY BREAST Right       SECTION       COLON SURGERY  2019    sigmoidectomy for diverticulitis     COLONOSCOPY N/A 2019    Procedure: COLONOSCOPY;  Surgeon: Shukri Matute MD;  [facility];  Service: General     CYSTOSCOPY N/A 2019    Procedure: CYSTOSCOPY;  Surgeon: Berna Carter MD;  Location: [facility];  Service: Urology     EYE SURGERY      bilateral cataract surgery     HYSTERECTOMY       LUMPECTOMY BREAST Right 10/27/2016    DR. PEARSON     IN LAP,SURG,COLECTOMY, PARTIAL, W/ANAST N/A 2019    Procedure: COLECTOMY, SIGMOID, LAPAROSCOPIC;  Surgeon: Shukri Matute MD;   Location: [facility];  Service: General     VITRECTOMY ANTERIOR Left 05/11/2009    retinal reattachment     VITRECTOMY ANTERIOR Left      Current medications include (per medical record):   Current Outpatient Medications:      acetaminophen (TYLENOL) 500 MG tablet, [ACETAMINOPHEN (TYLENOL) 500 MG TABLET] Take 500-1,000 mg by mouth every 6 (six) hours as needed for pain. (Patient not taking: Reported on 9/28/2022), Disp: , Rfl:      anastrozole (ARIMIDEX) 1 MG tablet, TAKE 1 TABLET BY MOUTH DAILY GENERIC EQUIVALENT FOR ARIMIDEX (Patient not taking: Reported on 5/18/2022), Disp: 90 tablet, Rfl: 0     aspirin 81 MG EC tablet, [ASPIRIN 81 MG EC TABLET] Take 162 mg by mouth every evening.        (Patient not taking: Reported on 12/19/2022), Disp: , Rfl:      calcium carbonate 500 mg, elemental, (OSCAL 500) 1250 (500 Ca) MG TABS tablet, 1 tablet with meals Orally Twice a day, Disp: , Rfl:      cyanocobalamin (VITAMIN B-12) 1000 MCG tablet, Take 1 tablet (1,000 mcg) by mouth daily (Patient not taking: Reported on 12/19/2022), Disp: 90 tablet, Rfl: 1     donepezil (ARICEPT) 10 MG tablet, Take 1 tablet (10 mg) by mouth At Bedtime, Disp: 90 tablet, Rfl: 1     ibuprofen (ADVIL) 200 MG tablet, [IBUPROFEN (ADVIL) 200 MG TABLET] Take 3 tablets (600 mg total) by mouth every 6 (six) hours as needed for pain. (Patient not taking: Reported on 9/28/2022), Disp: , Rfl: 0     latanoprost (XALATAN) 0.005 % ophthalmic solution, Place 1 drop into both eyes daily, Disp: , Rfl:      multivitamin (ONE A DAY) per tablet, [MULTIVITAMIN (ONE A DAY) PER TABLET] Take 1 tablet by mouth daily., Disp: , Rfl:      REPATHA SURECLICK 140 MG/ML prefilled autoinjector, , Disp: , Rfl:      senna-docusate (SENNOSIDES-DOCUSATE SODIUM) 8.6-50 mg tablet, [SENNA-DOCUSATE (SENNOSIDES-DOCUSATE SODIUM) 8.6-50 MG TABLET] Take 1 tablet by mouth 2 (two) times a day. To help prevent constipation post-op and while on pain medications. (Patient not taking:  "Reported on 9/28/2022), Disp: , Rfl: 0     Vitamin D (Cholecalciferol) 25 MCG (1000 UT) TABS, Take 1 tablet by mouth daily, Disp: , Rfl: .    FAMILY HISTORY:   Family medical history is significant for:   Family History   Problem Relation Age of Onset     Breast Cancer Cousin 73.00     Cancer Cousin         Bladder     Heart Disease Mother      Hypertension Mother      Heart Disease Father      Hypertension Father      Atrial fibrillation Sister      Thyroid Disease Sister      Skin Cancer Sister 73.00     No Known Problems Daughter      No Known Problems Son      Lung Cancer Maternal Uncle 67.00   Ms. Paul stated that her mother was forgetful but does not know if she was ever diagnosed with dementia or Alzheimer's.  They were otherwise unaware of any neurologic or neurodegenerative conditions in the family.    PSYCHIATRIC AND SUBSTANCE USE HISTORY:  With regard to her psychiatric history, Ms. Paul denied a history of past psychiatric conditions or mental health treatment.  At the current time, she described her mood as okay.  Jony actually described her mood as \"great.\"  She denied any significant depressive or anxiety symptoms and her  agreed.  She denied any suicidal ideation, plan or intent or hallucinations or delusions.     With regard to substance use, Ms. Paul indicated that she has never really been a big drinker and tends to only drink now on holidays or special occasions.  Even then she does not usually even have a whole drink.  Jony agreed with this information.  She denied any history of problematic alcohol use and Jony agreed.  She denied any history of tobacco or recreational drug use.    SOCIAL HISTORY:  Ms. Paul was born and raised in [place] and grew up in the [place] area. She was unaware of any complications in her mother's pregnancy with her or in her birth, or delays in reaching developmental milestones. She denied a history of early learning or attention " difficulties, individualized instruction, or grade repetition.  She described herself as an average student earning C's and B's in high school and graduated on time with her class.  She went on to the [employer] and earned a bachelor's in art and education.  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  She never returned to work outside the home.      Ms. Paul was able to correctly report that she has 2 children (1 girl) and 2 granddaughters (both from her daughter as her son has no children).  She stated that she has been  for 35 years (actually 46 years per ).     BEHAVIORAL OBSERVATIONS:   Ms. Paul arrived 10 minutes early and accompanied by her  Jony to today's appointment. She was appropriately dressed and groomed. She was alert and engaged during the interview. Gait was unremarkable. Her mood was euthmyic and her affect was appropriately reactive. Rapport was easily established and eye contact was unremarkable. She was pleasant and cooperative. Rate, prosody, and content of speech were grossly normal. No significant word finding difficulties or paraphasic errors were evident. There was no evidence of a olegario thought disorder; no hallucinations or delusions were apparent. Judgment and insight appeared fair.       Ms. Paul appeared adequately motivated and engaged easily in the testing component of the evaluation. She attempted all tasks presented to her and worked at a steady pace. She did not appear overly frustrated by difficult or challenging tasks and responded appropriately to encouragement.  No significant barriers to testing were observed and the following is judged to be a valid representation of Ms. Paul's current cognitive strengths and weaknesses.    LIMITATIONS:  Due to circumstances that limit contact during in-person clinical visits, this assessment was conducted  using face-to-face testing with the examiner wearing [employer] designated PPE and the patient wearing a face mask. The standard administration of these procedures involves in-person, face-to-face methods without PPE. The impact of applying non-standard administration methods has been evaluated only in part by scientific research. While every effort was made to simulate standard assessment practices, the diagnostic conclusions and recommendations for treatment provided in this report are being advanced with these limitations in mind.    TESTS ADMINISTERED:   [place] Naming Test (BNT), Category Fluency- AFV (CAT), Clock Drawing, Controlled Oral Word Association Test CFL (COWAT), Geriatric Depression Scale 30 (GDS), Webster Verbal Learning Test - R Form 1 (HVLT-R), Jakob-Osterrieth Complex Figure Test, copy only (RCFT), Stroop Color and Word Test, Trail Making Test (TMT), WAIS-IV (Similarities, Information, Block Design, Matrix Reasoning, Digit Span), WMS-IV Logical Memory, WRAT-4 Word Reading (blue), and WMS-III Information and Orientation.    MOANS norms were used for BNT, CAT, COWAT, RCFT, Stroop, TMT  (Raw scores in parentheses)    DESCRIPTIVE PERFORMANCE KEY:    Labels for tests with Normal Distributions  Score Label Standard Score %ile Rank   Exceptionally high score  > 130 > 98   Above average score 120-129 91-97   High average score 110-119 75-90   Average score  25-74   Low average score 80-89 9-24   Below average score 70-79 2-8   Exceptionally low score < 70 < 2     Labels for tests with Non-Normal Distributions  Score Label %ile Rank   Within normal expectations/ limits score (WNL) > 24   Low average score 9-24   Below average score 2-8   Exceptionally low score < 2     The following test results utilize score labels as adapted from Fabrizio Rajan, Luis Chi, Camrela Cazares, STEPHEN Bedolla, Barb Phillips, Jt Manzano, Harry Melton & Conference Participants  "(2020): American Academy of Clinical Neuropsychology consensus conference statement on uniform labeling of performance test scores, The Clinical Neuropsychologist, DOI: [id number]    All scores contain some measure of error; scores are reported here as they are obtained by the individual (without reference to the range of error). These are meant as labels and not interpretation of performance. While other relevant comments regarding task performance are provided below, please see the Assessment, Impressions and Diagnostic Summary sections of this report for interpretation of the scores and the cognitive profile as a whole, including what does and does not constitute impairment.    OPTIMAL PREMORBID INTELLECT:  Optimal premorbid intellectual abilities were estimated as falling in the average range based on Ms. Paul's educational and occupational histories and performance on tasks least likely to be affected by acquired brain dysfunction (i.e.,  hold tests : WRAT = 59).    SUMMARY OF TEST RESULTS:     Orientation, Attention and Processing Speed  Mental status exam was measured as a below average score for her age (7). She was oriented only to personal information and month. She stated that it was Thursday the 11th (actually Friday the 17th) in 2002 (actually 2023). [place] could provide no further information. She was over 90 minutes off when estimating the time and was unable to name the current or previous presidents.    Performance on a measure of basic attention and working memory was assessed as a below average score (15). This reflected a low average score for basic attention skills (LDF = 5) and working memory scores that ranged from a below average score (LDB = 2) to a low average score (LDS = 3).    A simple sequencing task (35\" ) was assessed as a high average score. A speeded word reading task (55) was assessed as a low average score. A " speeded color naming task (39) was assessed as a below average score.     Language  Sight word reading skills (59) were assessed as an average score. Verbal abstraction skills (20) were assessed as an average score. Fund of general knowledge (4) was assessed as a below average score. Her performance on a measure of semantic fluency was measured as a below average score (18). Confrontation naming was measured as a below average to low average score (40).    Visuospatial Skills  Performance on a block construction task (29) resulted in an average score. Performance on a pattern completion task (7) was measured as an average score.    Learning and Memory  Immediate memory for two short stories was measured as a low average score (19). Delayed recall of these stories resulted in an exceptionally low score (1, 9% retention). Recognition memory was measured as an exceptionally low score (9/23). She was also administered a measure of rote auditory verbal list learning that required her to learn a series of 12 words over three trials and retain and recall them over a delay. Her initial rate of learning (2,2,5) reflected an exceptionally low score. She retained and recalled 0 words (0% retention) after the delay for an exceptionally low score for delayed recall.  Recognition memory was measured as an exceptionally low score as she correctly identified 7 of the original words and made 4 false positive errors.     Executive Functioning  Working memory skills ranged from a below average score (LDB = 2) to a low average score (LDS = 3). A complex sequencing and set shifting task was discontinued at I (at 6 minutes) and notable for 5 errors. Her performance on a measure of phonemic fluency resulted in an average score (32). Her ability to inhibit an over-learned response was assessed as a below average score (12). Her performance on this task was notable for 3 errors. Her copy of a complex figure was performed as an average score  for her age (29) and notable for mild difficulty integrating detail. Her drawing of a clock was notable for initially drawing the hands without having them meet in the center, but then she corrected this.     Mood  On the Geriatric Depression Scale-30 (GDS), a self report measure of depressive symptomatology, she obtained a score of 0, placing her in the range of no significant symptoms of depression.     EVALUATION SERVICES AND TIME:   A clinical interview/neurobehavioral status examination was conducted with the patient and documented. I thoroughly reviewed the medical record, selected the neuropsychological test battery, provided supervision to the individual who administered and scored the neuropsychological test battery, interpreted/integrated patient data and test results, engaged in clinical decision making, treatment planning, report writing/preparation and provided and documented interactive feedback of test results on the day of testing. I (Psychologist) directly administered and scored 2+ neuropsychological tests. Please see below for a breakdown of time spent and the associated codes billed for these services.     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 Paul has a history of breast cancer (s/p right lumpectomy in 2016) and was referred for an evaluation of Subjective Memory Complaints. Feedback of results was provided on the day of testing.       Cherry Whitaker, PhD, LP,  ABPP  Clinical Neuropsychologist, [id number]  Board Certified in Clinical Neuropsychology    [facility] [address] , [address]  [place] [address]  Phone:  [phone]      Again, thank you for allowing me to participate in the care of your patient.        Sincerely,        Cherry Whitaker, PhD LP

## 2023-03-17 NOTE — LETTER
3/17/2023         RE: Norma Paul  [address]  [facility] [address]        Dear Colleague,    Thank you for referring your patient, Norma Paul, to the [facility] NEUROLOGY [facility]. Please see a copy of my visit note below.    NEUROPSYCHOLOGY CONSULT  [facility]    NAME: Norma Paul    YOB: 1945   AGE: 78  EDU: 16  DATE OF EVALUATION: 3/17/2023    REASON FOR REFERRAL:  Ms. Paul is a 78 year old, right-handed, White female presenting with concerns about cognitive functioning in the context of breast cancer (s/p right lumpectomy in 2016). She was referred for a neurocognitive evaluation by her neurologist, Dr. Sams from [facility] to assist with differential diagnosis and care planning.     DIAGNOSTIC SUMMARY:  Due to the current COVID-19 pandemic that limits contact during in-person clinical visits, the testing portion of this assessment was conducted using face-to-face methods with PPE worn by the examiner and a face-mask for the patient. The standard administration of these tests involves in-person, direct face-to-face methods. The full impact of applying non-standard administration methods with PPE is not fully appreciated at this time. The diagnostic conclusions and recommendations provided in this report are being advanced with caution.    With these limitations in mind, results of testing indicate that Ms. Paul is a woman of estimated average premorbid intellectual functioning whose performance is notable for borderline impaired prose learning, mild to moderately impaired semantic retrieval skills (confrontation naming, semantic fluency, fund of knowledge), moderately impaired inhibition, and severely impaired complex attention, rote verbal learning, and verbal memory. In addition, some variability was evident on measures of cognitive efficiency (ranging from mildly impaired to high  average) and executive functioning (ranging from severely impaired to average). These weaknesses were evident in the context of otherwise intact performance on measures of basic attention, aspects of language (sight word reading, verbal abstraction), visuospatial reasoning skills and aspects of executive functioning (phonemic fluency, visuospatial planning and organization). Finally, on a self-report questionnaire, she denied any significant symptoms of depression.     I was able to share the above results along with my impressions and recommendations (see below) with Ms. Paul and her  on the day of testing. I provided the opportunity for them to ask questions about the evaluation and results. At the end of our meeting, they indicated that they understood the results and that I had answered all of their questions. They were encouraged to reach out to me (contact information included in the AVS) should any further questions or concerns arise in the future. (Ms. Paul provided verbal consent for me to talk to Jony if he should call with questions.) I explained that I would send the impressions and recommendations from the report as part of the After Visit Summary (which will be directed to clinic staff to print and send by mail) and that Dr. Sams would be receiving the full report as the consulting provider as would her PCP.     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 Paul received the results of this evaluation on the day of testing.     Thank you for allowing me to participate in Ms. Paul's care.  Please contact me with any questions regarding the content of this report.      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 Paul stop driving. If she would like to continue driving, a formal driving evaluation is strongly recommended. Possible options for formal driving evaluations would be: [facility] ([phone]), [facility] ([phone]) or any option recommended by his physician.     Ms. Paul would benefit from oversight in her daily activities particularly in terms of managing medications (i.e., having her  check that her pillbox is set up correctly and making sure that she is taking her medications regularly and as prescribed).     It is recommended that Ms. Paul only cook or perform other potentially dangerous activities when her  is present.    It is strongly recommended that a family member or close friend  accompany Ms. Paul to all appointments to ensure that accurate information is given to providers and instructions are followed. It will be helpful to present the information in brief, repeated segments and have her repeat back the information in her own words to ensure understanding. But ultimately, her caregivers should be in charge of following through with any recommendations.     If not already done, completion of paperwork for advance directives and assignment of healthcare and financial power of  should be considered at this time.    To the extent that her  can continue to provide support and assistance, it seems that there current living situation is working for them.  However, if more support is needed in the future or if her  is unable to provide that support, Ms. Paul would benefit from increased supervision and support in her daily life so as to help her manage daily tasks such as cooking and cleaning as well as keeping track of medications and to ensure her personal safety. An assisted living facility would be ideal so as to allow her some independence while also providing a structured and supportive environment.    Family Resources    It will be increasingly important for Ms. Paul's family to have a strong support network in place to both aid in her care and to allow her  opportunities for personal time. The Alzheimer s Association (http://www.alz.org/mnnd 9-638-023-7310) has information about local support groups as well as informational materials and a 24 hour hotline.   Physical and Emotional Health    Ms. Paul will continue to benefit from the use of cognitive enhancing medications     It is important that Ms. Paul continue to adhere to her medication treatment regimen and follow a healthy diet so as to maintain her physical health, as this can have a significant impact on her physical, emotional, and cognitive functioning.     Under the guidance of her  physician, It is recommended that regular exercise be integrated into Ms. Paul's routine as it will likely benefit her cognitively and emotionally as well as physically.     Given her history of insomnia, Ms. Paul may benefit from evaluation in Sleep Medicine to determine if they have any medication or other interventions that might be helpful. She should speak with her physician or Neurologist to determine if they feel this would be appropriate.    Ms. Paul does not appear to be struggling with any significant emotional symptoms. Behavioral activation techniques such as regular exercise (under the guidance of her physician), recreational activities and regular social interaction would likely be effective in helping Ms. Paul to continue to maintain her mood.   Memory and Organization    Ms. Paul is encouraged to continue to engage in stimulating activities, (i.e., reading, card games, puzzles) to keep her cognitively active.     In her daily life, Ms. Paul will continue to benefit from the use of compensation techniques. That is, she may find it helpful to post reminder notes around the house, make lists, and carry a small calendar so that she can feel more comfortable and confident in her ability to remember information. A daily planner could also be used as a memory book where important information is recorded and organized for future reference.     Ms. Paul should also create a system to establish set locations for certain items (i.e., keys) such that she always knows where to put them upon entering the house and where to look when she needs them. If she would like to keep certain items out of sight (i.e., a wallet), she could set up a specific hidden place to keep items and use that same place so as to ensure she can find the required item when needed.     Ms. Paul will do best in an environment where a lot of routines are established so that she can follow a regular pattern for her daily  or weekly routines. She may  become confused when deviating from this routine.     Ms. Paul demonstrates intact basic attention but profound memory impairments, thus, she should not be given instructions for tasks any more than a few minutes in the future.    Ms. Paul should be aware that she may not be able to process information as quickly and efficiently as she once could. Thus she should allow herself extra time to complete tasks and not try and work under extreme time constraints.   Follow-up    Given the severity of Ms. Paul's impairments, neuropsychological re-evaluation may not be helpful in clarifying etiology. As such, future testing is not recommended unless clinically indicated by her care team.     --------------------------------EXTENDED REPORT--------------------------------  Verbal consent for neuropsychological testing was received following the provision of information about the nature of the evaluation, and the opportunity to ask questions.     HISTORY OF PRESENTING PROBLEM:    Relevant History  Ms. Paul was initially seen by Dr Sams in Neurology on 9/28/22. At that time, the family was reporting a one year history of cognitive decline. She scored a 8/30 on the MoCA. Dr Sams recommended MRI of the brain, B12 supplements and gabapentin for severe insomnia as well as Neuropsychological evaluation. In a follow-up with Dr. Sams on 12/19/22, MRI of the brain was described as negative for structural lesions, B12 supplements were not helpful and discontinued as her levels actually were too high. She did not obtain benefit from gabapentin either and family did not feel that insomnia was related to cognitive issues. Dr. Sams initiated Aricept for likely dementia noting that reversible causes of memory problems were ruled out.     Current Interview  Ms. Paul was accompanied by her  Jony and was a variable historian. Together they provided the following information.  "    At the present time, Ms. Paul reports she occasionally experiences memory problems but added that she is only aware this is an issue because \"he tells me,\" referring to her  often commenting that she forgets conversations.  But she otherwise denied problems with misplacing items, being forgetful of names of places or objects or other cognitive concerns such as word finding difficulties with language comprehension, attention problems or speed of thinking.  She was able to describe the ongoing COVID pandemic but estimated that its been going on for \"maybe 1 year,\" (actually 3).    Jony shared that he feels his wife is forgetful of short-term information but he feels it is related to her sleep problems or sleep pattern.  He notes that her memory is problematic when she does not sleep well.  He gave the example of some forgetfulness today when she was asking repeatedly what time the appointment is and what it is for even though he has told her multiple times.  But in general he feels that her memory \"ebbs and flows.\"  He stated he first noticed problems about a year ago.  That said he does not feel her memory is getting worse over time.  He did state that she began donepezil in December and feels that she has been doing better.  He has not noticed any problems with other cognitive skills.  He does actually wonder if the donepezil is contributing to some of her sleep problems as he feels that she is waking up at night now in the middle of the night when she did not before.  (Per records she had problems with insomnia before donepezil was started).    With regard to the activities of daily living, Ms. Paul reported that she lives with her  in a home where they have been for 20 years.  She noted that she used to do more cooking but her  is helping more because he is cooking more things for himself that he wants.  Jony later confirmed this.  He denied her having significant problems with " cooking other than 1 time she forgot to turn the burner off but it happened right away and there were no problems.  Ms. Paul shared that they both track appointments on a calendar and her  agreed with this noting that she is good about writing things down and checking the calendar regularly.  Ms. Paul stated that she sets up her own pillbox and remembers to take them.  Her  agreed noting that she only takes 2 prescription medications but also includes her vitamins and is good about setting up her pillbox and does remember to take them.  Ms. Paul indicated that she is not driving very much because she does not like to drive in the winter and estimated that she last drove about a month ago.  Jony agreed with this noting that she rarely drives in the winter and even when it is not winter he does do the majority of the driving.  Ms. Paul stated that her  has always managed the family finances and he agreed.    MEDICAL HISTORY:  Ms. Paul's medical history is significant for   Past Medical History:   Diagnosis Date     Breast cancer (H) 2016    right ca     Cystocele with prolapse      Hx of radiation therapy      Hyperlipidemia      Indwelling catheter present on admission      Prolapse, uterovaginal      Ms. Lin current problem list includes   Patient Active Problem List   Diagnosis     Malignant neoplasm of female breast, unspecified estrogen receptor status, unspecified laterality, unspecified site of breast (H)     Urinary retention     Hyponatremia     JOE (acute kidney injury) (H)     Leukocytosis     Urinary tract infection     Uterine prolapse     Hydronephrosis     Sepsis (H)     Dehydration     Renal stone     Pelvic abscess in female     Diverticulitis of large intestine with perforation and abscess without bleeding     Hypomagnesemia     Hypokalemia     Slow transit constipation     Diverticulitis     Aromatase inhibitor use     Vaginal vault prolapse     Ms.  Beverly denied any history of stroke, seizure or head injury with loss of consciousness. She denied having tested positive for COVID or experiencing an illness concerning for COVID.  Her  agreed with this information.    With regard to exercise Ms. Paul denied any regular exercise but acknowledging she has been told that she should be doing that.    Ms. Paul denied any significant sensory changes or disturbance in appetite.  She did acknowledge that she has some trouble with sleep noting that she falls asleep easily enough but wakes up in the middle of the night to use the bathroom and then cannot return to sleep.  As noted above, Jony wonders if this is related to donepezil.  Ms. Paul noted that she is often rested in the mornings but this morning she did feel tired. Jony stated that she does not snore and denied any unusual sleep behaviors.  She has never been to sleep medicine.  They did try gabapentin to help with sleep but it made her too groggy.    Diagnostic studies:  A recent MRI of the brain dated 10/6/2022 revealed:      IMPRESSION:  1.  No acute/subacute infarction, intracranial hemorrhage, mass effect, or hydrocephalus.  2.  Mild global brain parenchymal volume loss.    Past Surgical History:   Procedure Laterality Date     ABDOMEN SURGERY       BIOPSY BREAST Right       SECTION       COLON SURGERY  2019    sigmoidectomy for diverticulitis     COLONOSCOPY N/A 2019    Procedure: COLONOSCOPY;  Surgeon: Shukri Matute MD;  Location: [facility];  Service: General     CYSTOSCOPY N/A 2019    Procedure: CYSTOSCOPY;  Surgeon: Berna Carter MD;  Location: [facility] OR;  Service: Urology     EYE SURGERY      bilateral cataract surgery     HYSTERECTOMY       LUMPECTOMY BREAST Right 10/27/2016    DR. PEARSON     OR LAP,SURG,COLECTOMY, PARTIAL, W/ANAST N/A 2019    Procedure: COLECTOMY, SIGMOID, LAPAROSCOPIC;  Surgeon: Shukri Matute MD;   Location: [facility];  Service: General     VITRECTOMY ANTERIOR Left 05/11/2009    retinal reattachment     VITRECTOMY ANTERIOR Left      Current medications include (per medical record):   Current Outpatient Medications:      acetaminophen (TYLENOL) 500 MG tablet, [ACETAMINOPHEN (TYLENOL) 500 MG TABLET] Take 500-1,000 mg by mouth every 6 (six) hours as needed for pain. (Patient not taking: Reported on 9/28/2022), Disp: , Rfl:      anastrozole (ARIMIDEX) 1 MG tablet, TAKE 1 TABLET BY MOUTH DAILY GENERIC EQUIVALENT FOR ARIMIDEX (Patient not taking: Reported on 5/18/2022), Disp: 90 tablet, Rfl: 0     aspirin 81 MG EC tablet, [ASPIRIN 81 MG EC TABLET] Take 162 mg by mouth every evening.        (Patient not taking: Reported on 12/19/2022), Disp: , Rfl:      calcium carbonate 500 mg, elemental, (OSCAL 500) 1250 (500 Ca) MG TABS tablet, 1 tablet with meals Orally Twice a day, Disp: , Rfl:      cyanocobalamin (VITAMIN B-12) 1000 MCG tablet, Take 1 tablet (1,000 mcg) by mouth daily (Patient not taking: Reported on 12/19/2022), Disp: 90 tablet, Rfl: 1     donepezil (ARICEPT) 10 MG tablet, Take 1 tablet (10 mg) by mouth At Bedtime, Disp: 90 tablet, Rfl: 1     ibuprofen (ADVIL) 200 MG tablet, [IBUPROFEN (ADVIL) 200 MG TABLET] Take 3 tablets (600 mg total) by mouth every 6 (six) hours as needed for pain. (Patient not taking: Reported on 9/28/2022), Disp: , Rfl: 0     latanoprost (XALATAN) 0.005 % ophthalmic solution, Place 1 drop into both eyes daily, Disp: , Rfl:      multivitamin (ONE A DAY) per tablet, [MULTIVITAMIN (ONE A DAY) PER TABLET] Take 1 tablet by mouth daily., Disp: , Rfl:      REPATHA SURECLICK 140 MG/ML prefilled autoinjector, , Disp: , Rfl:      senna-docusate (SENNOSIDES-DOCUSATE SODIUM) 8.6-50 mg tablet, [SENNA-DOCUSATE (SENNOSIDES-DOCUSATE SODIUM) 8.6-50 MG TABLET] Take 1 tablet by mouth 2 (two) times a day. To help prevent constipation post-op and while on pain medications. (Patient not taking:  "Reported on 9/28/2022), Disp: , Rfl: 0     Vitamin D (Cholecalciferol) 25 MCG (1000 UT) TABS, Take 1 tablet by mouth daily, Disp: , Rfl: .    FAMILY HISTORY:   Family medical history is significant for:   Family History   Problem Relation Age of Onset     Breast Cancer Cousin 73.00     Cancer Cousin         Bladder     Heart Disease Mother      Hypertension Mother      Heart Disease Father      Hypertension Father      Atrial fibrillation Sister      Thyroid Disease Sister      Skin Cancer Sister 73.00     No Known Problems Daughter      No Known Problems Son      Lung Cancer Maternal Uncle 67.00   Ms. Paul stated that her mother was forgetful but does not know if she was ever diagnosed with dementia or Alzheimer's.  They were otherwise unaware of any neurologic or neurodegenerative conditions in the family.    PSYCHIATRIC AND SUBSTANCE USE HISTORY:  With regard to her psychiatric history, Ms. Paul denied a history of past psychiatric conditions or mental health treatment.  At the current time, she described her mood as okay.  Jony actually described her mood as \"great.\"  She denied any significant depressive or anxiety symptoms and her  agreed.  She denied any suicidal ideation, plan or intent or hallucinations or delusions.     With regard to substance use, Ms. Paul indicated that she has never really been a big drinker and tends to only drink now on holidays or special occasions.  Even then she does not usually even have a whole drink.  Jony agreed with this information.  She denied any history of problematic alcohol use and Jony agreed.  She denied any history of tobacco or recreational drug use.    SOCIAL HISTORY:  Ms. Paul was born and raised in [place] and grew up in the [place] area. She was unaware of any complications in her mother's pregnancy with her or in her birth, or delays in reaching developmental milestones. She denied a history of early learning or attention " difficulties, individualized instruction, or grade repetition.  She described herself as an average student earning C's and B's in high school and graduated on time with her class.  She went on to the [employer] and earned a bachelor's in art and education.  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  She never returned to work outside the home.      Ms. Paul was able to correctly report that she has 2 children (1 girl) and 2 granddaughters (both from her daughter as her son has no children).  She stated that she has been  for 35 years (actually 46 years per ).     BEHAVIORAL OBSERVATIONS:   Ms. Paul arrived 10 minutes early and accompanied by her  Jony to today's appointment. She was appropriately dressed and groomed. She was alert and engaged during the interview. Gait was unremarkable. Her mood was euthmyic and her affect was appropriately reactive. Rapport was easily established and eye contact was unremarkable. She was pleasant and cooperative. Rate, prosody, and content of speech were grossly normal. No significant word finding difficulties or paraphasic errors were evident. There was no evidence of a olegario thought disorder; no hallucinations or delusions were apparent. Judgment and insight appeared fair.       Ms. Paul appeared adequately motivated and engaged easily in the testing component of the evaluation. She attempted all tasks presented to her and worked at a steady pace. She did not appear overly frustrated by difficult or challenging tasks and responded appropriately to encouragement.  No significant barriers to testing were observed and the following is judged to be a valid representation of Ms. Paul's current cognitive strengths and weaknesses.    LIMITATIONS:  Due to circumstances that limit contact during in-person clinical visits, this assessment was conducted  using face-to-face testing with the examiner wearing [facility] designated PPE and the patient wearing a face mask. The standard administration of these procedures involves in-person, face-to-face methods without PPE. The impact of applying non-standard administration methods has been evaluated only in part by scientific research. While every effort was made to simulate standard assessment practices, the diagnostic conclusions and recommendations for treatment provided in this report are being advanced with these limitations in mind.    TESTS ADMINISTERED:   Olivet Naming Test (BNT), Category Fluency- AFV (CAT), Clock Drawing, Controlled Oral Word Association Test CFL (COWAT), Geriatric Depression Scale 30 (GDS), Webster Verbal Learning Test - R Form 1 (HVLT-R), Jakob-Osterrieth Complex Figure Test, copy only (RCFT), Stroop Color and Word Test, Trail Making Test (TMT), WAIS-IV (Similarities, Information, Block Design, Matrix Reasoning, Digit Span), WMS-IV Logical Memory, WRAT-4 Word Reading (blue), and WMS-III Information and Orientation.    MOANS norms were used for BNT, CAT, COWAT, RCFT, Stroop, TMT  (Raw scores in parentheses)    DESCRIPTIVE PERFORMANCE KEY:    Labels for tests with Normal Distributions  Score Label Standard Score %ile Rank   Exceptionally high score  > 130 > 98   Above average score 120-129 91-97   High average score 110-119 75-90   Average score  25-74   Low average score 80-89 9-24   Below average score 70-79 2-8   Exceptionally low score < 70 < 2     Labels for tests with Non-Normal Distributions  Score Label %ile Rank   Within normal expectations/ limits score (WNL) > 24   Low average score 9-24   Below average score 2-8   Exceptionally low score < 2     The following test results utilize score labels as adapted from Fabrizio Rajan, Luis Chi, Carmela Cazares, STEPHEN Bedolla, Barb Phillips, Jt Manzano, Harry Melton & Conference Participants  "(2020): American Academy of Clinical Neuropsychology consensus conference statement on uniform labeling of performance test scores, The Clinical Neuropsychologist, DOI: [id number]    All scores contain some measure of error; scores are reported here as they are obtained by the individual (without reference to the range of error). These are meant as labels and not interpretation of performance. While other relevant comments regarding task performance are provided below, please see the Assessment, Impressions and Diagnostic Summary sections of this report for interpretation of the scores and the cognitive profile as a whole, including what does and does not constitute impairment.    OPTIMAL PREMORBID INTELLECT:  Optimal premorbid intellectual abilities were estimated as falling in the average range based on Ms. Paul's educational and occupational histories and performance on tasks least likely to be affected by acquired brain dysfunction (i.e.,  hold tests : WRAT = 59).    SUMMARY OF TEST RESULTS:     Orientation, Attention and Processing Speed  Mental status exam was measured as a below average score for her age (7). She was oriented only to personal information and month. She stated that it was Thursday the 11th (actually Friday the 17th) in 2002 (actually 2023). [place] could provide no further information. She was over 90 minutes off when estimating the time and was unable to name the current or previous presidents.    Performance on a measure of basic attention and working memory was assessed as a below average score (15). This reflected a low average score for basic attention skills (LDF = 5) and working memory scores that ranged from a below average score (LDB = 2) to a low average score (LDS = 3).    A simple sequencing task (35\" ) was assessed as a high average score. A speeded word reading task (55) was assessed as a low average score. A " speeded color naming task (39) was assessed as a below average score.     Language  Sight word reading skills (59) were assessed as an average score. Verbal abstraction skills (20) were assessed as an average score. Fund of general knowledge (4) was assessed as a below average score. Her performance on a measure of semantic fluency was measured as a below average score (18). Confrontation naming was measured as a below average to low average score (40).    Visuospatial Skills  Performance on a block construction task (29) resulted in an average score. Performance on a pattern completion task (7) was measured as an average score.    Learning and Memory  Immediate memory for two short stories was measured as a low average score (19). Delayed recall of these stories resulted in an exceptionally low score (1, 9% retention). Recognition memory was measured as an exceptionally low score (9/23). She was also administered a measure of rote auditory verbal list learning that required her to learn a series of 12 words over three trials and retain and recall them over a delay. Her initial rate of learning (2,2,5) reflected an exceptionally low score. She retained and recalled 0 words (0% retention) after the delay for an exceptionally low score for delayed recall.  Recognition memory was measured as an exceptionally low score as she correctly identified 7 of the original words and made 4 false positive errors.     Executive Functioning  Working memory skills ranged from a below average score (LDB = 2) to a low average score (LDS = 3). A complex sequencing and set shifting task was discontinued at I (at 6 minutes) and notable for 5 errors. Her performance on a measure of phonemic fluency resulted in an average score (32). Her ability to inhibit an over-learned response was assessed as a below average score (12). Her performance on this task was notable for 3 errors. Her copy of a complex figure was performed as an average score  for her age (29) and notable for mild difficulty integrating detail. Her drawing of a clock was notable for initially drawing the hands without having them meet in the center, but then she corrected this.     Mood  On the Geriatric Depression Scale-30 (GDS), a self report measure of depressive symptomatology, she obtained a score of 0, placing her in the range of no significant symptoms of depression.     EVALUATION SERVICES AND TIME:   A clinical interview/neurobehavioral status examination was conducted with the patient and documented. I thoroughly reviewed the medical record, selected the neuropsychological test battery, provided supervision to the individual who administered and scored the neuropsychological test battery, interpreted/integrated patient data and test results, engaged in clinical decision making, treatment planning, report writing/preparation and provided and documented interactive feedback of test results on the day of testing. I (Psychologist) directly administered and scored 2+ neuropsychological tests. Please see below for a breakdown of time spent and the associated codes billed for these services.     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 Paul has a history of breast cancer (s/p right lumpectomy in 2016) and was referred for an evaluation of Subjective Memory Complaints. Feedback of results was provided on the day of testing.       Cherry Whitaker, PhD, LP,  ABPP  Clinical Neuropsychologist, [id number]  Board Certified in Clinical Neuropsychology    [facility] [address] , [address]  [place] [address]  Phone:  [phone]      Again, thank you for allowing me to participate in the care of your patient.        Sincerely,        Cherry Whitaker, PhD LP

## 2023-03-29 NOTE — PATIENT INSTRUCTIONS
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 Paul stop driving. If she would like to continue driving, a formal driving evaluation is strongly recommended. Possible options for formal driving evaluations would be: [facility] ([phone]), [facility] ([phone]) or any option recommended by his physician.   Ms. Paul would benefit from oversight in her daily activities particularly in terms of managing medications (i.e., having her  check that her pillbox is set up correctly and making sure that she is taking her medications regularly and as prescribed).   It is recommended that Ms. Paul only cook or perform other potentially dangerous activities when her  is present.  It is strongly recommended that a family member or close friend accompany Ms. Paul to all appointments to ensure that accurate information is given to providers and instructions are followed. It will be helpful to present the information in brief, repeated segments and have her repeat back the information in her own words to ensure understanding. But ultimately, her caregivers should be in charge of following through with any recommendations.   If not already done, completion of paperwork for advance directives and assignment of healthcare and financial power of  should be considered at this time.  To the extent that her  can continue to provide support and assistance, it seems that there current living situation is working for them.  However, if more support is needed in the future or  if her  is unable to provide that support, Ms. Paul would benefit from increased supervision and support in her daily life so as to help her manage daily tasks such as cooking and cleaning as well as keeping track of medications and to ensure her personal safety. An assisted living facility would be ideal so as to allow her some independence while also providing a structured and supportive environment.    Family Resources  It will be increasingly important for Ms. Paul's family to have a strong support network in place to both aid in her care and to allow her  opportunities for personal time. The Alzheimer s Association (http://www.alz.org/mnnd 1-795-406-6814) has information about local support groups as well as informational materials and a 24 hour hotline.   Physical and Emotional Health  Ms. Paul will continue to benefit from the use of cognitive enhancing medications   It is important that Ms. Paul continue to adhere to her medication treatment regimen and follow a healthy diet so as to maintain her physical health, as this can have a significant impact on her physical, emotional, and cognitive functioning.   Under the guidance of her physician, It is recommended that regular exercise be integrated into Ms. Paul's routine as it will likely benefit her cognitively and emotionally as well as physically.   Given her history of insomnia, Ms. Paul may benefit from evaluation in Sleep Medicine to determine if they have any medication or other interventions that might be helpful. She should speak with her physician or Neurologist to determine if they feel this would be appropriate.  Ms. Paul does not appear to be struggling with any significant emotional symptoms. Behavioral activation techniques such as regular exercise (under the guidance of her physician), recreational activities and regular social interaction would likely be effective in helping Ms. Paul to continue to  maintain her mood.   Memory and Organization  Ms. Paul is encouraged to continue to engage in stimulating activities, (i.e., reading, card games, puzzles) to keep her cognitively active.   In her daily life, Ms. Paul will continue to benefit from the use of compensation techniques. That is, she may find it helpful to post reminder notes around the house, make lists, and carry a small calendar so that she can feel more comfortable and confident in her ability to remember information. A daily planner could also be used as a memory book where important information is recorded and organized for future reference.   Ms. Paul should also create a system to establish set locations for certain items (i.e., keys) such that she always knows where to put them upon entering the house and where to look when she needs them. If she would like to keep certain items out of sight (i.e., a wallet), she could set up a specific hidden place to keep items and use that same place so as to ensure she can find the required item when needed.   Ms. Paul will do best in an environment where a lot of routines are established so that she can follow a regular pattern for her daily or weekly routines. She may  become confused when deviating from this routine.   Ms. Paul demonstrates intact basic attention but profound memory impairments, thus, she should not be given instructions for tasks any more than a few minutes in the future.  Ms. Paul should be aware that she may not be able to process information as quickly and efficiently as she once could. Thus she should allow herself extra time to complete tasks and not try and work under extreme time constraints.   Follow-up  Given the severity of Ms. Paul's impairments, neuropsychological re-evaluation may not be helpful in clarifying etiology. As such, future testing is not recommended unless clinically indicated by her care team.

## 2023-04-03 ENCOUNTER — OFFICE VISIT (OUTPATIENT)
Dept: NEUROLOGY | Facility: CLINIC | Age: 78
End: 2023-04-03
Payer: COMMERCIAL

## 2023-04-03 VITALS
SYSTOLIC BLOOD PRESSURE: 112 MMHG | HEART RATE: 76 BPM | WEIGHT: 148 LBS | RESPIRATION RATE: 16 BRPM | BODY MASS INDEX: 25.4 KG/M2 | DIASTOLIC BLOOD PRESSURE: 74 MMHG

## 2023-04-03 DIAGNOSIS — G30.9 MAJOR NEUROCOGNITIVE DISORDER DUE TO ALZHEIMER'S DISEASE (H): Primary | ICD-10-CM

## 2023-04-03 DIAGNOSIS — E53.8 LOW SERUM VITAMIN B12: ICD-10-CM

## 2023-04-03 DIAGNOSIS — G47.00 INSOMNIA, UNSPECIFIED TYPE: ICD-10-CM

## 2023-04-03 DIAGNOSIS — F02.80 MAJOR NEUROCOGNITIVE DISORDER DUE TO ALZHEIMER'S DISEASE (H): Primary | ICD-10-CM

## 2023-04-03 DIAGNOSIS — R41.89 SUBJECTIVE MEMORY COMPLAINTS: ICD-10-CM

## 2023-04-03 PROCEDURE — 99214 OFFICE O/P EST MOD 30 MIN: CPT | Performed by: PSYCHIATRY & NEUROLOGY

## 2023-04-03 RX ORDER — DONEPEZIL HYDROCHLORIDE 5 MG/1
5 TABLET, FILM COATED ORAL AT BEDTIME
Qty: 90 TABLET | Refills: 3 | Status: SHIPPED | OUTPATIENT
Start: 2023-04-03 | End: 2023-04-19

## 2023-04-03 NOTE — LETTER
4/3/2023         RE: Norma Paul  [address]  [facility] [address]        Dear Colleague,    Thank you for referring your patient, Norma Paul, to the [facility] [place]. Please see a copy of my visit note below.    NEUROLOGY OUTPATIENT PROGRESS NOTE   Apr 3, 2023     CHIEF COMPLAINT/REASON FOR VISIT/REASON FOR CONSULT  Patient presents with:  Follow Up    REASON FOR CONSULTATION-memory problems    REFERRAL SOURCE  Dr. Jodie Vu  CC Dr. Jodie Vu    HISTORY OF PRESENT ILLNESS  Norma Paul is a 78 year old female seen today for evaluation of cognitive patient is accompanied by .  1.  Patient symptoms started about last September.  They have progressively been getting worse.  She has more short-term issues, long-term memory issues.  Has difficulty finding words when she is trying to express herself.  Is currently not driving because of cognitive difficulties.  Has difficulty staying asleep and falling asleep.  This may be contributing to the cognitive issues.  She has bladder issues and has to get up in the middle of the night to go to the bathroom and then has difficulty falling asleep.    2.  There is no family history of dementia however there are several maternal aunts who have showing memory loss and confusion.  The patient's mother did possibly have memory problems in the later years of her life    3.  Per the family things that they have noticed:-  -Word retrieval and name recall difficulty  - Forgotten family names unable to recall past events  - Difficulty explaining and recalling where she is  - Difficulty recalling dates and people's names  - Asking the same questions over and over again  - Being anxious and knowing where certain things are kept  - Difficulty recalling how she got to a particular place  - Struggling with vocabulary and finding the right words and naming familiar objects  - Difficulty locating where she put things  - Difficulty sequencing  events    12/19/22  Patient returns today  1.  She did try the B12 supplement without benefit.  B12 was rechecked and was really elevated at 4000 and as a result the primary care doctor stopped this medication.  B12 was supposed to be rechecked early next year  2.  She did try the gabapentin at side effects.  Overall she is getting good sleep though does have issues in certain days  3.  Does have good days and bad days with her memory.  Sometimes is asking where she is and not realizing that she is at home.  The insomnia does not correlate with the good days and bad days.  4.  Neuropsychology set up in June.  Family is upset about the neuropsychology being so far away.    4/3/23  She returns today  1.  Has been doing the B12 supplement.  No significant benefit.  Currently is doing it every other day.  2.  Is no longer using gabapentin due to side effects.  3.  Is sleeping well most nights though occasionally will get up and have difficulty falling asleep  4.  Aricept is made significant improvement in her cognitive functioning.  She is no longer seeing things that do not make sense.  She is able to function more independently.  Does feel a bit tired with the Aricept and discussed about trying a low-dose of the Aricept.  5.  Did complete neuropsych evaluation.    Previous history is reviewed and this is unchanged.    PAST MEDICAL/SURGICAL HISTORY  Past Medical History:   Diagnosis Date     Breast cancer (H) 2016    right ca     Cystocele with prolapse      Hx of radiation therapy      Hyperlipidemia      Indwelling catheter present on admission      Prolapse, uterovaginal      Patient Active Problem List   Diagnosis     Malignant neoplasm of female breast, unspecified estrogen receptor status, unspecified laterality, unspecified site of breast (H)     Urinary retention     Hyponatremia     JOE (acute kidney injury) (H)     Leukocytosis     Urinary tract infection     Uterine prolapse     Hydronephrosis     Sepsis (H)      Dehydration     Renal stone     Pelvic abscess in female     Diverticulitis of large intestine with perforation and abscess without bleeding     Hypomagnesemia     Hypokalemia     Slow transit constipation     Diverticulitis     Aromatase inhibitor use     Vaginal vault prolapse   Significant for high cholesterol, breast cancer in remission with use of radiation and chemotherapy    FAMILY HISTORY  Family History   Problem Relation Age of Onset     Breast Cancer Cousin 73.00     Cancer Cousin         Bladder     Heart Disease Mother      Hypertension Mother      Heart Disease Father      Hypertension Father      Atrial fibrillation Sister      Thyroid Disease Sister      Skin Cancer Sister 73.00     No Known Problems Daughter      No Known Problems Son      Lung Cancer Maternal Uncle 67.00   Positive for memory loss in her mother and multiple maternal aunts    SOCIAL HISTORY  Social History     Tobacco Use     Smoking status: Never     Smokeless tobacco: Never   Vaping Use     Vaping status: Never Used     Passive vaping exposure: Yes   Substance Use Topics     Alcohol use: Not Currently     Drug use: No       SYSTEMS REVIEW  Twelve-system ROS was done and other than the HPI this was negative except for bladder symptoms and frequent urination, sleepiness during the daytime, confusion, anxiety, constipation  No new concerns/issues    MEDICATIONS  acetaminophen (TYLENOL) 500 MG tablet, Take 500-1,000 mg by mouth every 6 hours as needed  cyanocobalamin (VITAMIN B-12) 1000 MCG tablet, Take 1 tablet (1,000 mcg) by mouth daily  latanoprost (XALATAN) 0.005 % ophthalmic solution, Place 1 drop into both eyes daily  multivitamin (ONE A DAY) per tablet, [MULTIVITAMIN (ONE A DAY) PER TABLET] Take 1 tablet by mouth daily.  senna-docusate (SENNOSIDES-DOCUSATE SODIUM) 8.6-50 mg tablet, [SENNA-DOCUSATE (SENNOSIDES-DOCUSATE SODIUM) 8.6-50 MG TABLET] Take 1 tablet by mouth 2 (two) times a day. To help prevent constipation post-op  and while on pain medications.  Vitamin D (Cholecalciferol) 25 MCG (1000 UT) TABS, Take 1 tablet by mouth daily  anastrozole (ARIMIDEX) 1 MG tablet, TAKE 1 TABLET BY MOUTH DAILY GENERIC EQUIVALENT FOR ARIMIDEX (Patient not taking: Reported on 5/18/2022)  aspirin 81 MG EC tablet, [ASPIRIN 81 MG EC TABLET] Take 162 mg by mouth every evening.        (Patient not taking: Reported on 12/19/2022)  calcium carbonate 500 mg, elemental, (OSCAL 500) 1250 (500 Ca) MG TABS tablet, 1 tablet with meals Orally Twice a day (Patient not taking: Reported on 4/3/2023)  ibuprofen (ADVIL) 200 MG tablet, [IBUPROFEN (ADVIL) 200 MG TABLET] Take 3 tablets (600 mg total) by mouth every 6 (six) hours as needed for pain. (Patient not taking: Reported on 9/28/2022)  REPATHA SURECLICK 140 MG/ML prefilled autoinjector,     No current facility-administered medications on file prior to visit.       PHYSICAL EXAMINATION  VITALS: /74   Pulse 76   Resp 16   Wt 67.1 kg (148 lb)   BMI 25.40 kg/m    GENERAL: Healthy appearing, alert, no acute distress, normal habitus.  CARDIOVASCULAR: Extremities warm and well perfused. Pulses present.   NEUROLOGICAL:  Patient is awake and partially oriented to self, place and time.  Attention span is normal.  Memory is limited; previously MoCA 8.  Language is fluent and follows commands appropriately.  Appropriate fund of knowledge. Cranial nerves 2-12 are intact. There is no pronator drift.  Motor exam shows 5/5 strength in all extremities.  Tone is symmetric bilaterally in upper and lower extremities.  (Previously reflexes are symmetric and 1+ in upper extremities and lower extremities. Sensory exam is grossly intact to light touch, pin prick and vibration.)  Finger to nose and heel to shin is without dysmetria.  Romberg is negative.  Gait is normal and the patient is able to do tandem walk and walk on toes and heels with mild difficulty.  Exam similar to before.    DIAGNOSTICS  RELEVANT LABS  Component       Latest Ref Rng & Units 7/20/2022   Sodium      136 - 145 mmol/L 143   Potassium      3.4 - 5.3 mmol/L 4.3   Creatinine      0.51 - 0.95 mg/dL 0.73   Urea Nitrogen      8.0 - 23.0 mg/dL 11.0   Chloride      98 - 107 mmol/L 105   Carbon Dioxide (CO2)      22 - 29 mmol/L 27   Anion Gap      7 - 15 mmol/L 11   Glucose      70 - 99 mg/dL 97   Calcium      8.8 - 10.2 mg/dL 9.7   Protein Total      6.4 - 8.3 g/dL 6.2 (L)   Albumin      3.5 - 5.2 g/dL 4.1   Bilirubin Total      <=1.2 mg/dL 0.6   Alkaline Phosphatase      35 - 104 U/L 76   AST      10 - 35 U/L 27   ALT      10 - 35 U/L 22   GFR Estimate      >60 mL/min/1.73m2 84   TSH      0.30 - 4.20 uIU/mL 0.83   Vitamin B12      232 - 1,245 pg/mL 324       OUTSIDE RECORDS  Outside referral notes and chart notes were reviewed and pertinent information has been summarized (in addition to the HPI):-  Cancer notes          MRI brain images reviewed.  Age-related changes noted.  IMPRESSION:  1.  No acute/subacute infarction, intracranial hemorrhage, mass effect, or hydrocephalus.  2.  Mild global brain parenchymal volume loss.    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    This is a 78 year old female with cognitive problems since September 2021.  She does score really low on the [place] previously.  MRI brain is negative for structural lesions.  Blood work has shown low vitamin B12 and she has done supplementation without any benefit.  She does have significant insomnia and gabapentin was tried without benefit.  Family does not feel that the insomnia is causing her cognitive issues.  She has had neuropsych evaluation and has been diagnosed with major neurocognitive disorder due to Alzheimer's disease.    Currently she is on Aricept which is significantly helped.  Does complain of some possible tiredness during the daytime.  We will reduce  the dose from 10 mg to 5 mg.    Could consider referral to a sleep specialist for her insomnia though currently she is satisfied with the insomnia control.    She can stay on the B12 supplementation.  This is being managed through primary care.    Patient's  had numerous questions which I tried to answer.  Explained to him that we have already checked for reversible causes of memory problems.  Would recommend exercise, healthy lifestyle and healthy diet.  Also would recommend keeping the brain active.    Return back in 1 year.    -     donepezil (ARICEPT) 5 MG tablet; Take 1 tablet (5 mg) by mouth At Bedtime    Return in about 1 year (around 4/3/2024) for In-Clinic Visit (must).    Over 30 minutes were spent coordinating the care for the patient on the day of the encounter.  This includes previsit, during visit and post visit activities as documented above.  Counseling patient with prescription management.  Reviewing neuropsychology report.  (Activities include but not inclusive of reviewing chart, reviewing outside records, reviewing labs and imaging study results as well as the images, patient visit time including getting history and exam,  use if applicable, review of test results with the patient and coming up with a plan in a shared model, counseling patient and family, education and answering patient questions, EMR , EMR diagnosis entry and problem list management, medication reconciliation and prescription management if applicable, paperwork if applicable, printing documents and documentation of the visit activities.)        Nuno Sams MD  Neurologist  [facility]  Tel:- [phone]    This note was dictated using voice recognition software.  Any grammatical or context distortions are unintentional and inherent to the software.        Again, thank you for allowing me to participate in the care of your patient.        Sincerely,        Nuno  MD [place]

## 2023-04-03 NOTE — PROGRESS NOTES
NEUROLOGY OUTPATIENT PROGRESS NOTE   Apr 3, 2023     CHIEF COMPLAINT/REASON FOR VISIT/REASON FOR CONSULT  Patient presents with:  Follow Up    REASON FOR CONSULTATION-memory problems    REFERRAL SOURCE  Dr. Jodie Vu  CC Dr. Jodie Vu    HISTORY OF PRESENT ILLNESS  Norma Paul is a 78 year old female seen today for evaluation of cognitive patient is accompanied by .  1.  Patient symptoms started about last September.  They have progressively been getting worse.  She has more short-term issues, long-term memory issues.  Has difficulty finding words when she is trying to express herself.  Is currently not driving because of cognitive difficulties.  Has difficulty staying asleep and falling asleep.  This may be contributing to the cognitive issues.  She has bladder issues and has to get up in the middle of the night to go to the bathroom and then has difficulty falling asleep.    2.  There is no family history of dementia however there are several maternal aunts who have showing memory loss and confusion.  The patient's mother did possibly have memory problems in the later years of her life    3.  Per the family things that they have noticed:-  -Word retrieval and name recall difficulty  - Forgotten family names unable to recall past events  - Difficulty explaining and recalling where she is  - Difficulty recalling dates and people's names  - Asking the same questions over and over again  - Being anxious and knowing where certain things are kept  - Difficulty recalling how she got to a particular place  - Struggling with vocabulary and finding the right words and naming familiar objects  - Difficulty locating where she put things  - Difficulty sequencing events    12/19/22  Patient returns today  1.  She did try the B12 supplement without benefit.  B12 was rechecked and was really elevated at 4000 and as a result the primary care doctor stopped this medication.  B12 was supposed to be rechecked early  next year  2.  She did try the gabapentin at side effects.  Overall she is getting good sleep though does have issues in certain days  3.  Does have good days and bad days with her memory.  Sometimes is asking where she is and not realizing that she is at home.  The insomnia does not correlate with the good days and bad days.  4.  Neuropsychology set up in June.  Family is upset about the neuropsychology being so far away.    4/3/23  She returns today  1.  Has been doing the B12 supplement.  No significant benefit.  Currently is doing it every other day.  2.  Is no longer using gabapentin due to side effects.  3.  Is sleeping well most nights though occasionally will get up and have difficulty falling asleep  4.  Aricept is made significant improvement in her cognitive functioning.  She is no longer seeing things that do not make sense.  She is able to function more independently.  Does feel a bit tired with the Aricept and discussed about trying a low-dose of the Aricept.  5.  Did complete neuropsych evaluation.    Previous history is reviewed and this is unchanged.    PAST MEDICAL/SURGICAL HISTORY  Past Medical History:   Diagnosis Date     Breast cancer (H) 2016    right ca     Cystocele with prolapse      Hx of radiation therapy      Hyperlipidemia      Indwelling catheter present on admission      Prolapse, uterovaginal      Patient Active Problem List   Diagnosis     Malignant neoplasm of female breast, unspecified estrogen receptor status, unspecified laterality, unspecified site of breast (H)     Urinary retention     Hyponatremia     JOE (acute kidney injury) (H)     Leukocytosis     Urinary tract infection     Uterine prolapse     Hydronephrosis     Sepsis (H)     Dehydration     Renal stone     Pelvic abscess in female     Diverticulitis of large intestine with perforation and abscess without bleeding     Hypomagnesemia     Hypokalemia     Slow transit constipation     Diverticulitis     Aromatase  inhibitor use     Vaginal vault prolapse   Significant for high cholesterol, breast cancer in remission with use of radiation and chemotherapy    FAMILY HISTORY  Family History   Problem Relation Age of Onset     Breast Cancer Cousin 73.00     Cancer Cousin         Bladder     Heart Disease Mother      Hypertension Mother      Heart Disease Father      Hypertension Father      Atrial fibrillation Sister      Thyroid Disease Sister      Skin Cancer Sister 73.00     No Known Problems Daughter      No Known Problems Son      Lung Cancer Maternal Uncle 67.00   Positive for memory loss in her mother and multiple maternal aunts    SOCIAL HISTORY  Social History     Tobacco Use     Smoking status: Never     Smokeless tobacco: Never   Vaping Use     Vaping status: Never Used     Passive vaping exposure: Yes   Substance Use Topics     Alcohol use: Not Currently     Drug use: No       SYSTEMS REVIEW  Twelve-system ROS was done and other than the HPI this was negative except for bladder symptoms and frequent urination, sleepiness during the daytime, confusion, anxiety, constipation  No new concerns/issues    MEDICATIONS  acetaminophen (TYLENOL) 500 MG tablet, Take 500-1,000 mg by mouth every 6 hours as needed  cyanocobalamin (VITAMIN B-12) 1000 MCG tablet, Take 1 tablet (1,000 mcg) by mouth daily  latanoprost (XALATAN) 0.005 % ophthalmic solution, Place 1 drop into both eyes daily  multivitamin (ONE A DAY) per tablet, [MULTIVITAMIN (ONE A DAY) PER TABLET] Take 1 tablet by mouth daily.  senna-docusate (SENNOSIDES-DOCUSATE SODIUM) 8.6-50 mg tablet, [SENNA-DOCUSATE (SENNOSIDES-DOCUSATE SODIUM) 8.6-50 MG TABLET] Take 1 tablet by mouth 2 (two) times a day. To help prevent constipation post-op and while on pain medications.  Vitamin D (Cholecalciferol) 25 MCG (1000 UT) TABS, Take 1 tablet by mouth daily  anastrozole (ARIMIDEX) 1 MG tablet, TAKE 1 TABLET BY MOUTH DAILY GENERIC EQUIVALENT FOR ARIMIDEX (Patient not taking: Reported on  5/18/2022)  aspirin 81 MG EC tablet, [ASPIRIN 81 MG EC TABLET] Take 162 mg by mouth every evening.        (Patient not taking: Reported on 12/19/2022)  calcium carbonate 500 mg, elemental, (OSCAL 500) 1250 (500 Ca) MG TABS tablet, 1 tablet with meals Orally Twice a day (Patient not taking: Reported on 4/3/2023)  ibuprofen (ADVIL) 200 MG tablet, [IBUPROFEN (ADVIL) 200 MG TABLET] Take 3 tablets (600 mg total) by mouth every 6 (six) hours as needed for pain. (Patient not taking: Reported on 9/28/2022)  REPATHA SURECLICK 140 MG/ML prefilled autoinjector,     No current facility-administered medications on file prior to visit.       PHYSICAL EXAMINATION  VITALS: /74   Pulse 76   Resp 16   Wt 67.1 kg (148 lb)   BMI 25.40 kg/m    GENERAL: Healthy appearing, alert, no acute distress, normal habitus.  CARDIOVASCULAR: Extremities warm and well perfused. Pulses present.   NEUROLOGICAL:  Patient is awake and partially oriented to self, place and time.  Attention span is normal.  Memory is limited; previously MoCA 8.  Language is fluent and follows commands appropriately.  Appropriate fund of knowledge. Cranial nerves 2-12 are intact. There is no pronator drift.  Motor exam shows 5/5 strength in all extremities.  Tone is symmetric bilaterally in upper and lower extremities.  (Previously reflexes are symmetric and 1+ in upper extremities and lower extremities. Sensory exam is grossly intact to light touch, pin prick and vibration.)  Finger to nose and heel to shin is without dysmetria.  Romberg is negative.  Gait is normal and the patient is able to do tandem walk and walk on toes and heels with mild difficulty.  Exam similar to before.    DIAGNOSTICS  RELEVANT LABS  Component      Latest Ref Rng & Units 7/20/2022   Sodium      136 - 145 mmol/L 143   Potassium      3.4 - 5.3 mmol/L 4.3   Creatinine      0.51 - 0.95 mg/dL 0.73   Urea Nitrogen      8.0 - 23.0 mg/dL 11.0   Chloride      98 - 107 mmol/L 105   Carbon Dioxide  (CO2)      22 - 29 mmol/L 27   Anion Gap      7 - 15 mmol/L 11   Glucose      70 - 99 mg/dL 97   Calcium      8.8 - 10.2 mg/dL 9.7   Protein Total      6.4 - 8.3 g/dL 6.2 (L)   Albumin      3.5 - 5.2 g/dL 4.1   Bilirubin Total      <=1.2 mg/dL 0.6   Alkaline Phosphatase      35 - 104 U/L 76   AST      10 - 35 U/L 27   ALT      10 - 35 U/L 22   GFR Estimate      >60 mL/min/1.73m2 84   TSH      0.30 - 4.20 uIU/mL 0.83   Vitamin B12      232 - 1,245 pg/mL 324       OUTSIDE RECORDS  Outside referral notes and chart notes were reviewed and pertinent information has been summarized (in addition to the HPI):-  Cancer notes          MRI brain images reviewed.  Age-related changes noted.  IMPRESSION:  1.  No acute/subacute infarction, intracranial hemorrhage, mass effect, or hydrocephalus.  2.  Mild global brain parenchymal volume loss.    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    This is a 78 year old female with cognitive problems since September 2021.  She does score really low on the [place] previously.  MRI brain is negative for structural lesions.  Blood work has shown low vitamin B12 and she has done supplementation without any benefit.  She does have significant insomnia and gabapentin was tried without benefit.  Family does not feel that the insomnia is causing her cognitive issues.  She has had neuropsych evaluation and has been diagnosed with major neurocognitive disorder due to Alzheimer's disease.    Currently she is on Aricept which is significantly helped.  Does complain of some possible tiredness during the daytime.  We will reduce the dose from 10 mg to 5 mg.    Could consider referral to a sleep specialist for her insomnia though currently she is satisfied with the insomnia control.    She can stay on the B12 supplementation.  This is being managed through primary  care.    Patient's  had numerous questions which I tried to answer.  Explained to him that we have already checked for reversible causes of memory problems.  Would recommend exercise, healthy lifestyle and healthy diet.  Also would recommend keeping the brain active.    Return back in 1 year.    -     donepezil (ARICEPT) 5 MG tablet; Take 1 tablet (5 mg) by mouth At Bedtime    Return in about 1 year (around 4/3/2024) for In-Clinic Visit (must).    Over 30 minutes were spent coordinating the care for the patient on the day of the encounter.  This includes previsit, during visit and post visit activities as documented above.  Counseling patient with prescription management.  Reviewing neuropsychology report.  (Activities include but not inclusive of reviewing chart, reviewing outside records, reviewing labs and imaging study results as well as the images, patient visit time including getting history and exam,  use if applicable, review of test results with the patient and coming up with a plan in a shared model, counseling patient and family, education and answering patient questions, EMR , EMR diagnosis entry and problem list management, medication reconciliation and prescription management if applicable, paperwork if applicable, printing documents and documentation of the visit activities.)        Nuno Sams MD  Neurologist  [facility]  Tel:- [phone]    This note was dictated using voice recognition software.  Any grammatical or context distortions are unintentional and inherent to the software.

## 2023-05-05 ENCOUNTER — TELEPHONE (OUTPATIENT)
Dept: NEUROLOGY | Facility: CLINIC | Age: 78
End: 2023-05-05
Payer: COMMERCIAL

## 2023-05-05 NOTE — TELEPHONE ENCOUNTER
[facility] Call Center    Phone Message    May a detailed message be left on voicemail: no     Reason for Call: Medication Question or concern regarding medication   Prescription Clarification  Name of Medication: donepezil (ARICEPT) 5 MG tablet  Prescribing Provider: Nuno Boyer   Pharmacy:[facility]/PHARMACY #[id number] [address]    What on the order needs clarification? Mary from [facility] called in to get clarification on two prescriptions listed above.  Pharmacy received two separate scripts one for 5mg once a day and another two times a day.     Please call Mary at [facility] to further advise at # [phone]          Action Taken: Other: [facility] neurology    Travel Screening: Not Applicable

## 2023-05-05 NOTE — TELEPHONE ENCOUNTER
Called [facility] and spoke to Annita. Clarified that patient should be taking donepezil 5mg 2 tablets daily at bedtime.     Nothing further needed.     Jorje Alberto, RN, BSN  [facility]

## 2023-05-12 ENCOUNTER — TELEPHONE (OUTPATIENT)
Dept: NEUROLOGY | Facility: CLINIC | Age: 78
End: 2023-05-12
Payer: COMMERCIAL

## 2023-05-12 DIAGNOSIS — G30.9 MAJOR NEUROCOGNITIVE DISORDER DUE TO ALZHEIMER'S DISEASE (H): ICD-10-CM

## 2023-05-12 DIAGNOSIS — F02.80 MAJOR NEUROCOGNITIVE DISORDER DUE TO ALZHEIMER'S DISEASE (H): ICD-10-CM

## 2023-05-12 NOTE — TELEPHONE ENCOUNTER
[facility] Call Center    Phone Message    May a detailed message be left on voicemail: yes     Reason for Call: Medication Refill Request    Has the patient contacted the pharmacy for the refill? Yes   Name of medication being requested: Donepezil 10 mg tablet  Provider who prescribed the medication: Laterll  Pharmacy: [facility] [address]  Date medication is needed: ASAP    Patients spouse called requesting the prescription for the Donepezil 5mg tablet to be changed to the 10 mg tablet.     Please call patients spouse back to advise at [phone].      Action Taken: Message routed to:  Other: [facility]    Travel Screening: Not Applicable

## 2023-05-15 RX ORDER — DONEPEZIL HYDROCHLORIDE 10 MG/1
10 TABLET, FILM COATED ORAL AT BEDTIME
Qty: 90 TABLET | Refills: 1 | Status: SHIPPED | OUTPATIENT
Start: 2023-05-15 | End: 2023-12-26

## 2023-05-15 NOTE — TELEPHONE ENCOUNTER
Dr. Sanchez can you sign rx below for donepezil 10mg tablets? Pt already takes 10mg dose daily using 5mg tablets, family requesting this change.     Jorje Alberto, RN, BSN  [facility]

## 2023-05-16 ENCOUNTER — LAB REQUISITION (OUTPATIENT)
Dept: LAB | Facility: CLINIC | Age: 78
End: 2023-05-16

## 2023-05-16 DIAGNOSIS — E55.9 VITAMIN D DEFICIENCY, UNSPECIFIED: ICD-10-CM

## 2023-05-16 DIAGNOSIS — G30.1 ALZHEIMER'S DISEASE WITH LATE ONSET (CODE) (H): ICD-10-CM

## 2023-05-16 DIAGNOSIS — R30.0 DYSURIA: ICD-10-CM

## 2023-05-16 LAB — VIT B12 SERPL-MCNC: 476 PG/ML (ref 232–1245)

## 2023-05-16 PROCEDURE — 87086 URINE CULTURE/COLONY COUNT: CPT | Performed by: FAMILY MEDICINE

## 2023-05-16 PROCEDURE — 82306 VITAMIN D 25 HYDROXY: CPT | Performed by: FAMILY MEDICINE

## 2023-05-16 PROCEDURE — 82607 VITAMIN B-12: CPT | Performed by: FAMILY MEDICINE

## 2023-05-17 LAB
BACTERIA UR CULT: NORMAL
DEPRECATED CALCIDIOL+CALCIFEROL SERPL-MC: 72 UG/L (ref 20–75)

## 2023-05-18 ENCOUNTER — TELEPHONE (OUTPATIENT)
Dept: NEUROLOGY | Facility: CLINIC | Age: 78
End: 2023-05-18
Payer: COMMERCIAL

## 2023-05-18 NOTE — TELEPHONE ENCOUNTER
Patients spouse is wondering if the medication is causing the fatigue/tiredness or if that could be the Alzheimers disease.

## 2023-05-18 NOTE — TELEPHONE ENCOUNTER
"[facility] Call Center    Phone Message    May a detailed message be left on voicemail: yes     Reason for Call: Medication Question or concern regarding medication   Prescription Clarification  Name of Medication: donepezil (ARICEPT) 10 MG tablet    Prescribing Provider:   Reuben Sanchez   Pharmacy: [facility]   What on the order needs clarification? Patients spouse \"Jony\" would like to know if the medication listed above is suppose to cause tiredness in the morning when the patient wakes up. Boni states that the patient is taking this medication at bedtime and in the mornings when she wakes up she is really tired.   Spouse is asking if this is normal or if there is a better time to take this medication to avoid the tiredness the patient is experiencing in the mornings.     Patient is also asking could the tiredness be related to her medical diagnoses.     Spouse cannot recall if she was feeling like this prior to starting this medication and would like a call back to discuss.    Please contact Boni to further advise at # [phone]          Action Taken: Other: [facility]    Travel Screening: Not Applicable                                                                        "

## 2023-05-22 ENCOUNTER — ONCOLOGY VISIT (OUTPATIENT)
Dept: ONCOLOGY | Facility: HOSPITAL | Age: 78
End: 2023-05-22
Attending: NURSE PRACTITIONER
Payer: COMMERCIAL

## 2023-05-22 VITALS
TEMPERATURE: 98.1 F | DIASTOLIC BLOOD PRESSURE: 71 MMHG | WEIGHT: 134.5 LBS | SYSTOLIC BLOOD PRESSURE: 121 MMHG | OXYGEN SATURATION: 98 % | BODY MASS INDEX: 22.96 KG/M2 | HEART RATE: 78 BPM | HEIGHT: 64 IN | RESPIRATION RATE: 14 BRPM

## 2023-05-22 DIAGNOSIS — Z12.31 ENCOUNTER FOR SCREENING MAMMOGRAM FOR MALIGNANT NEOPLASM OF BREAST: ICD-10-CM

## 2023-05-22 DIAGNOSIS — C50.919 MALIGNANT NEOPLASM OF FEMALE BREAST, UNSPECIFIED ESTROGEN RECEPTOR STATUS, UNSPECIFIED LATERALITY, UNSPECIFIED SITE OF BREAST (H): Primary | ICD-10-CM

## 2023-05-22 PROCEDURE — 99213 OFFICE O/P EST LOW 20 MIN: CPT | Performed by: NURSE PRACTITIONER

## 2023-05-22 PROCEDURE — G0463 HOSPITAL OUTPT CLINIC VISIT: HCPCS | Performed by: NURSE PRACTITIONER

## 2023-05-22 RX ORDER — MIRABEGRON 50 MG/1
1 TABLET, FILM COATED, EXTENDED RELEASE ORAL DAILY
COMMUNITY
Start: 2022-10-19 | End: 2023-05-22

## 2023-05-22 ASSESSMENT — PAIN SCALES - GENERAL: PAINLEVEL: NO PAIN (0)

## 2023-05-22 NOTE — LETTER
5/22/2023         RE: Norma Paul  [address]  [place] [address]        Dear Colleague,    Thank you for referring your patient, Norma Paul, to the [facility] [place]. Please see a copy of my visit note below.    [facility] Hematology and Oncology Progress Note    Patient: Norma Paul  MRN: [id number]  Date of Service: May 22, 2023          Reason for Visit    Chief Complaint   Patient presents with     Oncology Clinic Visit     1 year follow up related to Malignant neoplasm of female breast, unspecified estrogen receptor status, unspecified laterality, unspecified site of breast       Assessment and Plan     Cancer Staging   Malignant neoplasm of female breast, unspecified estrogen receptor status, unspecified laterality, unspecified site of breast (H)  Staging form: Breast, AJCC 7th Edition  - Pathologic: Stage IA (T1c, N0, cM0) - Signed by Ammy Wilkerson APRN CNP on 11/17/2021  ER Status: Positive  ND Status: Positive  HER2 Status: Negative      1.  History of breast cancer: stage IA. . Oncotype 12. She was taking anastrozole and tolerated quite well.  Started January 2017.  Completed in Feb. 2022. Mammogram in October was normal, will continue that annually.   She will continue to come annually in May. We may be able to discharge from clinic next year.      2.  osteopenia: she is at high risk for osteoporosis with aromatase inhibitors. She is taking calcium and vit d. Her last DEXA is stable and still showing osteopenia. We will continue calcium/vit d. Encourage weight bearing exercises. Repeat DEXA in 2024    ECOG Performance    0 - Independent    Distress Screening (within last 30 days)    1. How concerned are you about your ability to eat? : 0  2. How concerned are you about unintended weight loss or your current weight? : 0  3. How concerned are you about feeling depressed or very sad? : 0  4. How concerned are you about feeling anxious or very scared? :  0  5. Do you struggle with the loss of meaning and jose alejandro in your life? : Not at all  6. How concerned are you about work and home life issues that may be affected by your cancer? : 0  7. How concerned are you about knowing what resources are available to help you? : 0  8. Do you currently have what you would describe as Rastafarian or spiritual struggles?            : Not at all       Pain  Pain Score: No Pain (0)    Problem List    Patient Active Problem List   Diagnosis     Malignant neoplasm of female breast, unspecified estrogen receptor status, unspecified laterality, unspecified site of breast (H)     Urinary retention     Hyponatremia     JOE (acute kidney injury) (H)     Leukocytosis     Urinary tract infection     Uterine prolapse     Hydronephrosis     Sepsis (H)     Dehydration     Renal stone     Pelvic abscess in female     Diverticulitis of large intestine with perforation and abscess without bleeding     Hypomagnesemia     Hypokalemia     Slow transit constipation     Diverticulitis     Aromatase inhibitor use     Vaginal vault prolapse        ______________________________________________________________________________    History of Present Illness    Measurable disease: None postoperatively     Current therapy: Anastrozole 1 mg by mouth daily started January 1, 2017.  Completed February, 2022     Treatment history: Left lumpectomy and then adjuvant radiation therapy, 16 fractions completed December 2016     Interim History: pt is here today for follow up visit. She is doing well.  Has been diagnosed with mild dementia/alzheimers.  Has not noticed any changes in her breast.  Has not noticed any new bone or back pain.  Is not losing weight.    Review of Systems    Pertinent items are noted in HPI.    Past History    Past Medical History:   Diagnosis Date     Breast cancer (H) 2016    right ca     Cystocele with prolapse      Hx of radiation therapy      Hyperlipidemia      Indwelling catheter present on  "admission      Prolapse, uterovaginal        PHYSICAL EXAM  /71 (BP Location: Left arm, Patient Position: Sitting, Cuff Size: Adult Regular)   Pulse 78   Temp 98.1  F (36.7  C) (Tympanic)   Resp 14   Ht 1.626 m (5' 4\")   Wt 61 kg (134 lb 8 oz)   SpO2 98%   BMI 23.09 kg/m      GENERAL: no acute distress. Cooperative in conversation. Here with . Mask on  RESP: Regular respiratory rate. No expiratory wheezes   MUSCULOSKELETAL: no bilateral leg swelling  NEURO: non focal. Alert and oriented x3.   PSYCH: within normal limits. No depression or anxiety.  SKIN: exposed skin is dry intact.   BREAST: Bilateral exam done.  Lumpectomy incision is well-healed.  Very slight scarring with some tenderness in right breast.  No abnormal lesions or rashes noted.  No evidence for local recurrence bilaterally.      Lab Results    Recent Results (from the past 168 hour(s))   Vitamin D Deficiency   Result Value Ref Range    Vitamin D, Total (25-Hydroxy) 72 20 - 75 ug/L   Vitamin B12   Result Value Ref Range    Vitamin B12 476 232 - 1,245 pg/mL   Urine Culture    Specimen: Urine, NOS   Result Value Ref Range    Culture 10,000-50,000 CFU/mL Mixture of urogenital toby        Imaging    No results found.      Signed by: CRYSTAL Miranda CNP    Oncology Rooming Note    May 22, 2023 1:34 PM   Norma Paul is a 78 year old female who presents for:    Chief Complaint   Patient presents with     Oncology Clinic Visit     1 year follow up related to Malignant neoplasm of female breast, unspecified estrogen receptor status, unspecified laterality, unspecified site of breast     Initial Vitals: /71 (BP Location: Left arm, Patient Position: Sitting, Cuff Size: Adult Regular)   Pulse 78   Temp 98.1  F (36.7  C) (Tympanic)   Resp 14   Ht 1.626 m (5' 4\")   Wt 61 kg (134 lb 8 oz)   SpO2 98%   BMI 23.09 kg/m   Estimated body mass index is 23.09 kg/m  as calculated from the following:    Height as of this " "encounter: 1.626 m (5' 4\").    Weight as of this encounter: 61 kg (134 lb 8 oz). Body surface area is 1.66 meters squared.  No Pain (0) Comment: Data Unavailable   No LMP recorded. Patient is postmenopausal.  Allergies reviewed: Yes  Medications reviewed: Yes    Medications: Medication refills not needed today.  Pharmacy name entered into [employer]:    [id number] - [address]    Clinical concerns: While attempting to reconcile medications during pt visit, pt's spouse was extremely rude and interrupted the entire process. Spouse did not allow pt to answer medication questions, and spouse also refused to allow me to keep any as needed medications on pt chart. When pt requested a copy of her medication list, pt's spouse said \"No Norma, that is not necessary.\" I informed pt that it appears she must have medications better managed elsewhere, as it seems we do not have up to date information.      1 year follow up related to Malignant neoplasm of female breast, unspecified estrogen receptor status, unspecified laterality, unspecified site of breast    QUITA ALEGRIA CMA                Again, thank you for allowing me to participate in the care of your patient.        Sincerely,        CRYSTAL Miranda CNP    "

## 2023-05-22 NOTE — PROGRESS NOTES
"Oncology Rooming Note    May 22, 2023 1:34 PM   Norma Paul is a 78 year old female who presents for:    Chief Complaint   Patient presents with     Oncology Clinic Visit     1 year follow up related to Malignant neoplasm of female breast, unspecified estrogen receptor status, unspecified laterality, unspecified site of breast     Initial Vitals: /71 (BP Location: Left arm, Patient Position: Sitting, Cuff Size: Adult Regular)   Pulse 78   Temp 98.1  F (36.7  C) (Tympanic)   Resp 14   Ht 1.626 m (5' 4\")   Wt 61 kg (134 lb 8 oz)   SpO2 98%   BMI 23.09 kg/m   Estimated body mass index is 23.09 kg/m  as calculated from the following:    Height as of this encounter: 1.626 m (5' 4\").    Weight as of this encounter: 61 kg (134 lb 8 oz). Body surface area is 1.66 meters squared.  No Pain (0) Comment: Data Unavailable   No LMP recorded. Patient is postmenopausal.  Allergies reviewed: Yes  Medications reviewed: Yes    Medications: Medication refills not needed today.  Pharmacy name entered into [employer]:    [id number] - [address]    Clinical concerns: While attempting to reconcile medications during pt visit, pt's spouse was extremely rude and interrupted the entire process. Spouse did not allow pt to answer medication questions, and spouse also refused to allow me to keep any as needed medications on pt chart. When pt requested a copy of her medication list, pt's spouse said \"No Norma, that is not necessary.\" I informed pt that it appears she must have medications better managed elsewhere, as it seems we do not have up to date information.      1 year follow up related to Malignant neoplasm of female breast, unspecified estrogen receptor status, unspecified laterality, unspecified site of breast    QUITA ALEGRIA CMA            "

## 2023-05-22 NOTE — PROGRESS NOTES
[facility] Hematology and Oncology Progress Note    Patient: Norma Paul  MRN: [id number]  Date of Service: May 22, 2023          Reason for Visit    Chief Complaint   Patient presents with     Oncology Clinic Visit     1 year follow up related to Malignant neoplasm of female breast, unspecified estrogen receptor status, unspecified laterality, unspecified site of breast       Assessment and Plan     Cancer Staging   Malignant neoplasm of female breast, unspecified estrogen receptor status, unspecified laterality, unspecified site of breast (H)  Staging form: Breast, AJCC 7th Edition  - Pathologic: Stage IA (T1c, N0, cM0) - Signed by Ammy Wilkerson APRN CNP on 11/17/2021  ER Status: Positive  NE Status: Positive  HER2 Status: Negative      1.  History of breast cancer: stage IA. . Oncotype 12. She was taking anastrozole and tolerated quite well.  Started January 2017.  Completed in Feb. 2022. Mammogram in October was normal, will continue that annually.   She will continue to come annually in May. We may be able to discharge from clinic next year.      2.  osteopenia: she is at high risk for osteoporosis with aromatase inhibitors. She is taking calcium and vit d. Her last DEXA is stable and still showing osteopenia. We will continue calcium/vit d. Encourage weight bearing exercises. Repeat DEXA in 2024    ECOG Performance    0 - Independent    Distress Screening (within last 30 days)    1. How concerned are you about your ability to eat? : 0  2. How concerned are you about unintended weight loss or your current weight? : 0  3. How concerned are you about feeling depressed or very sad? : 0  4. How concerned are you about feeling anxious or very scared? : 0  5. Do you struggle with the loss of meaning and jose alejandro in your life? : Not at all  6. How concerned are you about work and home life issues that may be affected by your cancer? : 0  7. How concerned are you about knowing what resources are available to  "help you? : 0  8. Do you currently have what you would describe as Muslim or spiritual struggles?            : Not at all       Pain  Pain Score: No Pain (0)    Problem List    Patient Active Problem List   Diagnosis     Malignant neoplasm of female breast, unspecified estrogen receptor status, unspecified laterality, unspecified site of breast (H)     Urinary retention     Hyponatremia     JOE (acute kidney injury) (H)     Leukocytosis     Urinary tract infection     Uterine prolapse     Hydronephrosis     Sepsis (H)     Dehydration     Renal stone     Pelvic abscess in female     Diverticulitis of large intestine with perforation and abscess without bleeding     Hypomagnesemia     Hypokalemia     Slow transit constipation     Diverticulitis     Aromatase inhibitor use     Vaginal vault prolapse        ______________________________________________________________________________    History of Present Illness    Measurable disease: None postoperatively     Current therapy: Anastrozole 1 mg by mouth daily started January 1, 2017.  Completed February, 2022     Treatment history: Left lumpectomy and then adjuvant radiation therapy, 16 fractions completed December 2016     Interim History: pt is here today for follow up visit. She is doing well.  Has been diagnosed with mild dementia/alzheimers.  Has not noticed any changes in her breast.  Has not noticed any new bone or back pain.  Is not losing weight.    Review of Systems    Pertinent items are noted in HPI.    Past History    Past Medical History:   Diagnosis Date     Breast cancer (H) 2016    right ca     Cystocele with prolapse      Hx of radiation therapy      Hyperlipidemia      Indwelling catheter present on admission      Prolapse, uterovaginal        PHYSICAL EXAM  /71 (BP Location: Left arm, Patient Position: Sitting, Cuff Size: Adult Regular)   Pulse 78   Temp 98.1  F (36.7  C) (Tympanic)   Resp 14   Ht 1.626 m (5' 4\")   Wt 61 kg (134 lb 8 " oz)   SpO2 98%   BMI 23.09 kg/m      GENERAL: no acute distress. Cooperative in conversation. Here with . Mask on  RESP: Regular respiratory rate. No expiratory wheezes   MUSCULOSKELETAL: no bilateral leg swelling  NEURO: non focal. Alert and oriented x3.   PSYCH: within normal limits. No depression or anxiety.  SKIN: exposed skin is dry intact.   BREAST: Bilateral exam done.  Lumpectomy incision is well-healed.  Very slight scarring with some tenderness in right breast.  No abnormal lesions or rashes noted.  No evidence for local recurrence bilaterally.      Lab Results    Recent Results (from the past 168 hour(s))   Vitamin D Deficiency   Result Value Ref Range    Vitamin D, Total (25-Hydroxy) 72 20 - 75 ug/L   Vitamin B12   Result Value Ref Range    Vitamin B12 476 232 - 1,245 pg/mL   Urine Culture    Specimen: Urine, NOS   Result Value Ref Range    Culture 10,000-50,000 CFU/mL Mixture of urogenital toby        Imaging    No results found.      Signed by: CRYSTAL Miranda CNP

## 2023-05-24 NOTE — TELEPHONE ENCOUNTER
[facility] Call Center    Phone Message    May a detailed message be left on voicemail: yes     Reason for Call: Other: Pt spouse Jony is calling Wendy back and is stated that his questions from last week were answered so if there is no other questions from Wendy he does not need a call back he stated.      Action Taken: Message routed to:  Other: [facility]    Travel Screening: Not Applicable

## 2023-05-24 NOTE — TELEPHONE ENCOUNTER
Left message for Jony, to call back to relay Dr. Sams's message below.    Wendy Enrique LPN on 5/24/2023 at 9:28 AM

## 2023-06-29 ENCOUNTER — TELEPHONE (OUTPATIENT)
Dept: NEUROLOGY | Facility: CLINIC | Age: 78
End: 2023-06-29
Payer: COMMERCIAL

## 2023-06-29 NOTE — TELEPHONE ENCOUNTER
[facility] Call Center    Phone Message    May a detailed message be left on voicemail: yes     Reason for Call: Symptoms or Concerns     If patient has red-flag symptoms, warm transfer to triage line    Current symptom or concern: Tiredness and upset stomach after being or Aricept for some time.    Symptoms have been present for:  A few month(s)    Has patient previously been seen for this? No      Are there any new or worsening symptoms? Yes: Pt's  Jony has been observing pt and he has a question:    Pt is on Aricept - every morning she feels super tired in the morning. She takes the medication at bed time but she still wakes up super tired. She also has stomach pains in the mornings.     Is there an alternate time that pt can take the medication to minimize or eliminate her symptoms?    Please call Jony to discuss.      Action Taken: Message routed to:  Other: [facility]    Travel Screening: Not Applicable

## 2023-06-29 NOTE — TELEPHONE ENCOUNTER
Spoke with patients spouse (ctc on file).      Education provided on normal Aricept side effects related to sleepiness and patient having morning tiredness with upset stomach at times.     Spouse states that patient seems to have benefits from the medication, but wonders if these side effects are normal from the medication. This has been previously review with spouse in 05/18 TE. Writer reviewed information about Aricept.    Spouse verbalizes understanding and will continue to monitor patients symptoms/side effects (he will alter timing of the medication to slightly earlier in the morning to determine if this minimizes morning side effects).    Advised to call back with further questions.    Blue Daly, RN, BSN  [facility]

## 2023-07-19 ENCOUNTER — LAB REQUISITION (OUTPATIENT)
Dept: LAB | Facility: CLINIC | Age: 78
End: 2023-07-19

## 2023-07-19 DIAGNOSIS — G30.1 ALZHEIMER'S DISEASE WITH LATE ONSET (CODE) (H): ICD-10-CM

## 2023-07-19 LAB
ANION GAP SERPL CALCULATED.3IONS-SCNC: 13 MMOL/L (ref 7–15)
BUN SERPL-MCNC: 26 MG/DL (ref 8–23)
CALCIUM SERPL-MCNC: 9.4 MG/DL (ref 8.8–10.2)
CHLORIDE SERPL-SCNC: 104 MMOL/L (ref 98–107)
CREAT SERPL-MCNC: 0.76 MG/DL (ref 0.51–0.95)
DEPRECATED HCO3 PLAS-SCNC: 27 MMOL/L (ref 22–29)
GFR SERPL CREATININE-BSD FRML MDRD: 80 ML/MIN/1.73M2
GLUCOSE SERPL-MCNC: 121 MG/DL (ref 70–99)
POTASSIUM SERPL-SCNC: 4 MMOL/L (ref 3.4–5.3)
SODIUM SERPL-SCNC: 144 MMOL/L (ref 136–145)

## 2023-07-19 PROCEDURE — 80048 BASIC METABOLIC PNL TOTAL CA: CPT | Performed by: FAMILY MEDICINE

## 2023-07-21 ENCOUNTER — LAB REQUISITION (OUTPATIENT)
Dept: LAB | Facility: CLINIC | Age: 78
End: 2023-07-21

## 2023-07-21 DIAGNOSIS — K30 FUNCTIONAL DYSPEPSIA: ICD-10-CM

## 2023-07-21 PROCEDURE — 87338 HPYLORI STOOL AG IA: CPT | Performed by: FAMILY MEDICINE

## 2023-07-24 LAB — H PYLORI AG STL QL IA: NEGATIVE

## 2023-10-30 ENCOUNTER — ANCILLARY PROCEDURE (OUTPATIENT)
Dept: MAMMOGRAPHY | Facility: CLINIC | Age: 78
End: 2023-10-30
Attending: NURSE PRACTITIONER
Payer: COMMERCIAL

## 2023-10-30 DIAGNOSIS — C50.919 MALIGNANT NEOPLASM OF FEMALE BREAST, UNSPECIFIED ESTROGEN RECEPTOR STATUS, UNSPECIFIED LATERALITY, UNSPECIFIED SITE OF BREAST (H): ICD-10-CM

## 2023-10-30 DIAGNOSIS — Z12.31 ENCOUNTER FOR SCREENING MAMMOGRAM FOR MALIGNANT NEOPLASM OF BREAST: ICD-10-CM

## 2023-10-30 PROCEDURE — 77067 SCR MAMMO BI INCL CAD: CPT

## 2023-12-23 DIAGNOSIS — F02.80 MAJOR NEUROCOGNITIVE DISORDER DUE TO ALZHEIMER'S DISEASE (H): ICD-10-CM

## 2023-12-23 DIAGNOSIS — G30.9 MAJOR NEUROCOGNITIVE DISORDER DUE TO ALZHEIMER'S DISEASE (H): ICD-10-CM

## 2023-12-26 RX ORDER — DONEPEZIL HYDROCHLORIDE 10 MG/1
10 TABLET, FILM COATED ORAL AT BEDTIME
Qty: 90 TABLET | Refills: 1 | Status: SHIPPED | OUTPATIENT
Start: 2023-12-26 | End: 2024-04-03

## 2023-12-26 NOTE — TELEPHONE ENCOUNTER
Refill request for: donepezil 10mg  Directions: Take 1 tablet (10 mg) by mouth At Bedtime     LOV: 04/03/23  NOV: 04/03/24    90 day supply with 1 refills Medication T'd for review and signature    Wendy Enrique LPN on 12/26/2023 at 10:51 AM

## 2024-03-18 ENCOUNTER — PATIENT OUTREACH (OUTPATIENT)
Dept: ONCOLOGY | Facility: HOSPITAL | Age: 79
End: 2024-03-18
Payer: COMMERCIAL

## 2024-04-03 ENCOUNTER — OFFICE VISIT (OUTPATIENT)
Dept: NEUROLOGY | Facility: CLINIC | Age: 79
End: 2024-04-03
Payer: COMMERCIAL

## 2024-04-03 VITALS
BODY MASS INDEX: 20.25 KG/M2 | DIASTOLIC BLOOD PRESSURE: 78 MMHG | RESPIRATION RATE: 16 BRPM | HEART RATE: 88 BPM | SYSTOLIC BLOOD PRESSURE: 111 MMHG | WEIGHT: 118 LBS

## 2024-04-03 DIAGNOSIS — R41.89 SUBJECTIVE MEMORY COMPLAINTS: ICD-10-CM

## 2024-04-03 DIAGNOSIS — G30.9 MAJOR NEUROCOGNITIVE DISORDER DUE TO ALZHEIMER'S DISEASE (H): Primary | ICD-10-CM

## 2024-04-03 DIAGNOSIS — G47.00 INSOMNIA, UNSPECIFIED TYPE: ICD-10-CM

## 2024-04-03 DIAGNOSIS — R63.4 WEIGHT LOSS: ICD-10-CM

## 2024-04-03 DIAGNOSIS — F02.80 MAJOR NEUROCOGNITIVE DISORDER DUE TO ALZHEIMER'S DISEASE (H): Primary | ICD-10-CM

## 2024-04-03 DIAGNOSIS — R63.0 POOR APPETITE: ICD-10-CM

## 2024-04-03 PROCEDURE — G2211 COMPLEX E/M VISIT ADD ON: HCPCS | Performed by: PSYCHIATRY & NEUROLOGY

## 2024-04-03 PROCEDURE — 99214 OFFICE O/P EST MOD 30 MIN: CPT | Performed by: PSYCHIATRY & NEUROLOGY

## 2024-04-03 RX ORDER — DONEPEZIL HYDROCHLORIDE 10 MG/1
10 TABLET, FILM COATED ORAL AT BEDTIME
Qty: 90 TABLET | Refills: 1 | Status: SHIPPED | OUTPATIENT
Start: 2024-04-03

## 2024-04-03 RX ORDER — MEMANTINE HYDROCHLORIDE 5 MG/1
5 TABLET ORAL 2 TIMES DAILY
Qty: 360 TABLET | Refills: 3 | Status: SHIPPED | OUTPATIENT
Start: 2024-04-03 | End: 2024-09-26 | Stop reason: DRUGHIGH

## 2024-04-03 NOTE — PROGRESS NOTES
NEUROLOGY OUTPATIENT PROGRESS NOTE   Apr 3, 2024     CHIEF COMPLAINT/REASON FOR VISIT/REASON FOR CONSULT  Patient presents with:  Follow Up: Annual     REASON FOR CONSULTATION-memory problems    REFERRAL SOURCE  Dr. Jodie Vu  CC Dr. Jodie Vu    HISTORY OF PRESENT ILLNESS  Norma Paul is a 79 year old female seen today for evaluation of cognitive patient is accompanied by .  1.  Patient symptoms started about last September.  They have progressively been getting worse.  She has more short-term issues, long-term memory issues.  Has difficulty finding words when she is trying to express herself.  Is currently not driving because of cognitive difficulties.  Has difficulty staying asleep and falling asleep.  This may be contributing to the cognitive issues.  She has bladder issues and has to get up in the middle of the night to go to the bathroom and then has difficulty falling asleep.    2.  There is no family history of dementia however there are several maternal aunts who have showing memory loss and confusion.  The patient's mother did possibly have memory problems in the later years of her life    3.  Per the family things that they have noticed:-  -Word retrieval and name recall difficulty  - Forgotten family names unable to recall past events  - Difficulty explaining and recalling where she is  - Difficulty recalling dates and people's names  - Asking the same questions over and over again  - Being anxious and knowing where certain things are kept  - Difficulty recalling how she got to a particular place  - Struggling with vocabulary and finding the right words and naming familiar objects  - Difficulty locating where she put things  - Difficulty sequencing events    12/19/22  Patient returns today  1.  She did try the B12 supplement without benefit.  B12 was rechecked and was really elevated at 4000 and as a result the primary care doctor stopped this medication.  B12 was supposed to be  rechecked early next year  2.  She did try the gabapentin at side effects.  Overall she is getting good sleep though does have issues in certain days  3.  Does have good days and bad days with her memory.  Sometimes is asking where she is and not realizing that she is at home.  The insomnia does not correlate with the good days and bad days.  4.  Neuropsychology set up in June.  Family is upset about the neuropsychology being so far away.    4/3/23  She returns today  1.  Has been doing the B12 supplement.  No significant benefit.  Currently is doing it every other day.  2.  Is no longer using gabapentin due to side effects.  3.  Is sleeping well most nights though occasionally will get up and have difficulty falling asleep  4.  Aricept is made significant improvement in her cognitive functioning.  She is no longer seeing things that do not make sense.  She is able to function more independently.  Does feel a bit tired with the Aricept and discussed about trying a low-dose of the Aricept.  5.  Did complete neuropsych evaluation.    4/3/24  Patient returns today  1.  Memory has been about the same.  Continues to have episodes of confusion where she will forget how to do things or keep on asking the same questions again and again.  2.  Aricept has been helping.  No major side effects  3.  Sleeping better at night.  Does have some tiredness which increases the episodes of confusion.  Discussed about taking naps during daytime  4.  Has lost her appetite and has been losing weight.  Discussed about seeing a nutritionist.  Family wants to think about that.  No other new concerns.    Previous history is reviewed and this is unchanged.    PAST MEDICAL/SURGICAL HISTORY  Past Medical History:   Diagnosis Date    Breast cancer (H) 2016    right ca    Cystocele with prolapse     Hx of radiation therapy     Hyperlipidemia     Indwelling catheter present on admission     Prolapse, uterovaginal      Patient Active Problem List    Diagnosis    Malignant neoplasm of female breast, unspecified estrogen receptor status, unspecified laterality, unspecified site of breast (H)    Urinary retention    Hyponatremia    JOE (acute kidney injury) (H24)    Leukocytosis    Urinary tract infection    Uterine prolapse    Hydronephrosis    Sepsis (H)    Dehydration    Renal stone    Pelvic abscess in female    Diverticulitis of large intestine with perforation and abscess without bleeding    Hypomagnesemia    Hypokalemia    Slow transit constipation    Diverticulitis    Aromatase inhibitor use    Vaginal vault prolapse   Significant for high cholesterol, breast cancer in remission with use of radiation and chemotherapy    FAMILY HISTORY  Family History   Problem Relation Age of Onset    Breast Cancer Cousin 73.00    Cancer Cousin         Bladder    Heart Disease Mother     Hypertension Mother     Heart Disease Father     Hypertension Father     Atrial fibrillation Sister     Thyroid Disease Sister     Skin Cancer Sister 73.00    No Known Problems Daughter     No Known Problems Son     Lung Cancer Maternal Uncle 67.00   Positive for memory loss in her mother and multiple maternal aunts    SOCIAL HISTORY  Social History     Tobacco Use    Smoking status: Never    Smokeless tobacco: Never   Vaping Use    Vaping Use: Never used   Substance Use Topics    Alcohol use: Not Currently    Drug use: No       SYSTEMS REVIEW  Twelve-system ROS was done and other than the HPI this was negative except for bladder symptoms and frequent urination, sleepiness during the daytime, confusion, anxiety, constipation  No new symptoms.    MEDICATIONS  Current Outpatient Medications   Medication Sig Dispense Refill    acetaminophen (TYLENOL) 500 MG tablet Take 500-1,000 mg by mouth every 6 hours as needed      calcium carbonate 500 mg, elemental, (OSCAL 500) 1250 (500 Ca) MG TABS tablet 1000 mg by mouth daily      cyanocobalamin (VITAMIN B-12) 1000 MCG tablet Take 1 tablet (1,000  mcg) by mouth daily 90 tablet 1    donepezil (ARICEPT) 10 MG tablet Take 1 tablet (10 mg) by mouth at bedtime 90 tablet 1    latanoprost (XALATAN) 0.005 % ophthalmic solution Place 1 drop into both eyes daily      memantine (NAMENDA) 5 MG tablet Take 1 tablet (5 mg) by mouth 2 times daily 5 mg daily for week 1. Then increase by 5 mg every week till on 10 mg twice a day- as tolerated. 360 tablet 3    multivitamin (ONE A DAY) per tablet [MULTIVITAMIN (ONE A DAY) PER TABLET] Take 1 tablet by mouth daily.      Vitamin D (Cholecalciferol) 25 MCG (1000 UT) TABS Take 1 tablet by mouth daily       No current facility-administered medications for this visit.        PHYSICAL EXAMINATION  VITALS: /78   Pulse 88   Resp 16   Wt 53.5 kg (118 lb)   BMI 20.25 kg/m    GENERAL: Healthy appearing, alert, no acute distress, normal habitus.  CARDIOVASCULAR: Extremities warm and well perfused. Pulses present.   NEUROLOGICAL:  Patient is awake and partially oriented to self, place and time.  Attention span is normal.  Memory is limited; previously MoCA 8.  Language is fluent and follows commands appropriately.  Appropriate fund of knowledge. Cranial nerves 2-12 are intact. There is no pronator drift.  Motor exam shows 5/5 strength in all extremities.  Tone is symmetric bilaterally in upper and lower extremities.  (Previously reflexes are symmetric and 1+ in upper extremities and lower extremities. Sensory exam is grossly intact to light touch, pin prick and vibration.)  Finger to nose and heel to shin is without dysmetria.  Romberg is negative.  Gait is normal and the patient is able to do tandem walk and walk on toes and heels with mild difficulty.  Exam unchanged compared to before.    DIAGNOSTICS  RELEVANT LABS  Component      Latest Ref Rng & Units 7/20/2022   Sodium      136 - 145 mmol/L 143   Potassium      3.4 - 5.3 mmol/L 4.3   Creatinine      0.51 - 0.95 mg/dL 0.73   Urea Nitrogen      8.0 - 23.0 mg/dL 11.0   Chloride       98 - 107 mmol/L 105   Carbon Dioxide (CO2)      22 - 29 mmol/L 27   Anion Gap      7 - 15 mmol/L 11   Glucose      70 - 99 mg/dL 97   Calcium      8.8 - 10.2 mg/dL 9.7   Protein Total      6.4 - 8.3 g/dL 6.2 (L)   Albumin      3.5 - 5.2 g/dL 4.1   Bilirubin Total      <=1.2 mg/dL 0.6   Alkaline Phosphatase      35 - 104 U/L 76   AST      10 - 35 U/L 27   ALT      10 - 35 U/L 22   GFR Estimate      >60 mL/min/1.73m2 84   TSH      0.30 - 4.20 uIU/mL 0.83   Vitamin B12      232 - 1,245 pg/mL 324       OUTSIDE RECORDS  Outside referral notes and chart notes were reviewed and pertinent information has been summarized (in addition to the HPI):-  Cancer notes              MRI brain images reviewed.  Age-related changes noted.  IMPRESSION:  1.  No acute/subacute infarction, intracranial hemorrhage, mass effect, or hydrocephalus.  2.  Mild global brain parenchymal volume loss.    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  Poor appetite    This is a 79 year old female with cognitive problems since September 2021.  She does score really low on the [place] previously.  MRI brain is negative for structural lesions.  Blood work has shown low vitamin B12 and she has done supplementation without any benefit.  Neuropsychology has suggested major neurocognitive disorder due to Alzheimer's disease.  She started on Aricept which has been helping.  No side effects.  Will continue.  Will add Namenda to see if she gets further benefit.    She does have some episodes of confusion related to the dementia when she is more tired.  Encouraged daytime naps.  There is no agitation will hold off on other medications.    Discussed prognosis of dementia.  She is lost a lot of weight due to poor appetite and would recommend seeing a nutritionist though insurance will not cover this and family wants to think  about it.    Patient's  had numerous questions which I tried to answer.  Discussed lack of cure for dementia.  Would recommend exercise, healthy lifestyle and healthy diet.  Also would recommend keeping the brain active.    Return back in 1 year.    -     memantine (NAMENDA) 5 MG tablet; Take 1 tablet (5 mg) by mouth 2 times daily 5 mg daily for week 1. Then increase by 5 mg every week till on 10 mg twice a day- as tolerated.  -     donepezil (ARICEPT) 10 MG tablet; Take 1 tablet (10 mg) by mouth at bedtime    Return in about 1 year (around 4/3/2025) for In-Clinic Visit (must).    Over 31 minutes were spent coordinating the care for the patient on the day of the encounter.  This includes previsit, during visit and post visit activities as documented above.  Counseling patient and family.  Prescription management.  Answering questions.  (Activities include but not inclusive of reviewing chart, reviewing outside records, reviewing labs and imaging study results as well as the images, patient visit time including getting history and exam,  use if applicable, review of test results with the patient and coming up with a plan in a shared model, counseling patient and family, education and answering patient questions, EMR , EMR diagnosis entry and problem list management, medication reconciliation and prescription management if applicable, paperwork if applicable, printing documents and documentation of the visit activities.)        Nuno Sams MD  Neurologist  [facility]  Tel:- [phone]    This note was dictated using voice recognition software.  Any grammatical or context distortions are unintentional and inherent to the software.

## 2024-04-03 NOTE — LETTER
4/3/2024         RE: Norma Paul  [address]  [place] [address]        Dear Colleague,    Thank you for referring your patient, Norma Paul, to the [facility] [place]. Please see a copy of my visit note below.    NEUROLOGY OUTPATIENT PROGRESS NOTE   Apr 3, 2024     CHIEF COMPLAINT/REASON FOR VISIT/REASON FOR CONSULT  Patient presents with:  Follow Up: Annual     REASON FOR CONSULTATION-memory problems    REFERRAL SOURCE  Dr. Jodie Vu  CC Dr. Jodie Vu    HISTORY OF PRESENT ILLNESS  Norma Paul is a 79 year old female seen today for evaluation of cognitive patient is accompanied by .  1.  Patient symptoms started about last September.  They have progressively been getting worse.  She has more short-term issues, long-term memory issues.  Has difficulty finding words when she is trying to express herself.  Is currently not driving because of cognitive difficulties.  Has difficulty staying asleep and falling asleep.  This may be contributing to the cognitive issues.  She has bladder issues and has to get up in the middle of the night to go to the bathroom and then has difficulty falling asleep.    2.  There is no family history of dementia however there are several maternal aunts who have showing memory loss and confusion.  The patient's mother did possibly have memory problems in the later years of her life    3.  Per the family things that they have noticed:-  -Word retrieval and name recall difficulty  - Forgotten family names unable to recall past events  - Difficulty explaining and recalling where she is  - Difficulty recalling dates and people's names  - Asking the same questions over and over again  - Being anxious and knowing where certain things are kept  - Difficulty recalling how she got to a particular place  - Struggling with vocabulary and finding the right words and naming familiar objects  - Difficulty locating where she put things  - Difficulty  sequencing events    12/19/22  Patient returns today  1.  She did try the B12 supplement without benefit.  B12 was rechecked and was really elevated at 4000 and as a result the primary care doctor stopped this medication.  B12 was supposed to be rechecked early next year  2.  She did try the gabapentin at side effects.  Overall she is getting good sleep though does have issues in certain days  3.  Does have good days and bad days with her memory.  Sometimes is asking where she is and not realizing that she is at home.  The insomnia does not correlate with the good days and bad days.  4.  Neuropsychology set up in June.  Family is upset about the neuropsychology being so far away.    4/3/23  She returns today  1.  Has been doing the B12 supplement.  No significant benefit.  Currently is doing it every other day.  2.  Is no longer using gabapentin due to side effects.  3.  Is sleeping well most nights though occasionally will get up and have difficulty falling asleep  4.  Aricept is made significant improvement in her cognitive functioning.  She is no longer seeing things that do not make sense.  She is able to function more independently.  Does feel a bit tired with the Aricept and discussed about trying a low-dose of the Aricept.  5.  Did complete neuropsych evaluation.    4/3/24  Patient returns today  1.  Memory has been about the same.  Continues to have episodes of confusion where she will forget how to do things or keep on asking the same questions again and again.  2.  Aricept has been helping.  No major side effects  3.  Sleeping better at night.  Does have some tiredness which increases the episodes of confusion.  Discussed about taking naps during daytime  4.  Has lost her appetite and has been losing weight.  Discussed about seeing a nutritionist.  Family wants to think about that.  No other new concerns.    Previous history is reviewed and this is unchanged.    PAST MEDICAL/SURGICAL HISTORY  Past Medical  History:   Diagnosis Date     Breast cancer (H) 2016    right ca     Cystocele with prolapse      Hx of radiation therapy      Hyperlipidemia      Indwelling catheter present on admission      Prolapse, uterovaginal      Patient Active Problem List   Diagnosis     Malignant neoplasm of female breast, unspecified estrogen receptor status, unspecified laterality, unspecified site of breast (H)     Urinary retention     Hyponatremia     JOE (acute kidney injury) (H24)     Leukocytosis     Urinary tract infection     Uterine prolapse     Hydronephrosis     Sepsis (H)     Dehydration     Renal stone     Pelvic abscess in female     Diverticulitis of large intestine with perforation and abscess without bleeding     Hypomagnesemia     Hypokalemia     Slow transit constipation     Diverticulitis     Aromatase inhibitor use     Vaginal vault prolapse   Significant for high cholesterol, breast cancer in remission with use of radiation and chemotherapy    FAMILY HISTORY  Family History   Problem Relation Age of Onset     Breast Cancer Cousin 73.00     Cancer Cousin         Bladder     Heart Disease Mother      Hypertension Mother      Heart Disease Father      Hypertension Father      Atrial fibrillation Sister      Thyroid Disease Sister      Skin Cancer Sister 73.00     No Known Problems Daughter      No Known Problems Son      Lung Cancer Maternal Uncle 67.00   Positive for memory loss in her mother and multiple maternal aunts    SOCIAL HISTORY  Social History     Tobacco Use     Smoking status: Never     Smokeless tobacco: Never   Vaping Use     Vaping Use: Never used   Substance Use Topics     Alcohol use: Not Currently     Drug use: No       SYSTEMS REVIEW  Twelve-system ROS was done and other than the HPI this was negative except for bladder symptoms and frequent urination, sleepiness during the daytime, confusion, anxiety, constipation  No new symptoms.    MEDICATIONS  Current Outpatient Medications   Medication Sig  Dispense Refill     acetaminophen (TYLENOL) 500 MG tablet Take 500-1,000 mg by mouth every 6 hours as needed       calcium carbonate 500 mg, elemental, (OSCAL 500) 1250 (500 Ca) MG TABS tablet 1000 mg by mouth daily       cyanocobalamin (VITAMIN B-12) 1000 MCG tablet Take 1 tablet (1,000 mcg) by mouth daily 90 tablet 1     donepezil (ARICEPT) 10 MG tablet Take 1 tablet (10 mg) by mouth at bedtime 90 tablet 1     latanoprost (XALATAN) 0.005 % ophthalmic solution Place 1 drop into both eyes daily       memantine (NAMENDA) 5 MG tablet Take 1 tablet (5 mg) by mouth 2 times daily 5 mg daily for week 1. Then increase by 5 mg every week till on 10 mg twice a day- as tolerated. 360 tablet 3     multivitamin (ONE A DAY) per tablet [MULTIVITAMIN (ONE A DAY) PER TABLET] Take 1 tablet by mouth daily.       Vitamin D (Cholecalciferol) 25 MCG (1000 UT) TABS Take 1 tablet by mouth daily       No current facility-administered medications for this visit.        PHYSICAL EXAMINATION  VITALS: /78   Pulse 88   Resp 16   Wt 53.5 kg (118 lb)   BMI 20.25 kg/m    GENERAL: Healthy appearing, alert, no acute distress, normal habitus.  CARDIOVASCULAR: Extremities warm and well perfused. Pulses present.   NEUROLOGICAL:  Patient is awake and partially oriented to self, place and time.  Attention span is normal.  Memory is limited; previously MoCA 8.  Language is fluent and follows commands appropriately.  Appropriate fund of knowledge. Cranial nerves 2-12 are intact. There is no pronator drift.  Motor exam shows 5/5 strength in all extremities.  Tone is symmetric bilaterally in upper and lower extremities.  (Previously reflexes are symmetric and 1+ in upper extremities and lower extremities. Sensory exam is grossly intact to light touch, pin prick and vibration.)  Finger to nose and heel to shin is without dysmetria.  Romberg is negative.  Gait is normal and the patient is able to do tandem walk and walk on toes and heels with mild  difficulty.  Exam unchanged compared to before.    DIAGNOSTICS  RELEVANT LABS  Component      Latest Ref Rng & Units 7/20/2022   Sodium      136 - 145 mmol/L 143   Potassium      3.4 - 5.3 mmol/L 4.3   Creatinine      0.51 - 0.95 mg/dL 0.73   Urea Nitrogen      8.0 - 23.0 mg/dL 11.0   Chloride      98 - 107 mmol/L 105   Carbon Dioxide (CO2)      22 - 29 mmol/L 27   Anion Gap      7 - 15 mmol/L 11   Glucose      70 - 99 mg/dL 97   Calcium      8.8 - 10.2 mg/dL 9.7   Protein Total      6.4 - 8.3 g/dL 6.2 (L)   Albumin      3.5 - 5.2 g/dL 4.1   Bilirubin Total      <=1.2 mg/dL 0.6   Alkaline Phosphatase      35 - 104 U/L 76   AST      10 - 35 U/L 27   ALT      10 - 35 U/L 22   GFR Estimate      >60 mL/min/1.73m2 84   TSH      0.30 - 4.20 uIU/mL 0.83   Vitamin B12      232 - 1,245 pg/mL 324       OUTSIDE RECORDS  Outside referral notes and chart notes were reviewed and pertinent information has been summarized (in addition to the HPI):-  Cancer notes              MRI brain images reviewed.  Age-related changes noted.  IMPRESSION:  1.  No acute/subacute infarction, intracranial hemorrhage, mass effect, or hydrocephalus.  2.  Mild global brain parenchymal volume loss.    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  Poor appetite    This is a 79 year old female with cognitive problems since September 2021.  She does score really low on the [place] previously.  MRI brain is negative for structural lesions.  Blood work has shown low vitamin B12 and she has done supplementation without any benefit.  Neuropsychology has suggested major neurocognitive disorder due to Alzheimer's disease.  She started on Aricept which has been helping.  No side effects.  Will continue.  Will add Namenda to see if she gets further benefit.    She does have some episodes of confusion related to the dementia  when she is more tired.  Encouraged daytime naps.  There is no agitation will hold off on other medications.    Discussed prognosis of dementia.  She is lost a lot of weight due to poor appetite and would recommend seeing a nutritionist though insurance will not cover this and family wants to think about it.    Patient's  had numerous questions which I tried to answer.  Discussed lack of cure for dementia.  Would recommend exercise, healthy lifestyle and healthy diet.  Also would recommend keeping the brain active.    Return back in 1 year.    -     memantine (NAMENDA) 5 MG tablet; Take 1 tablet (5 mg) by mouth 2 times daily 5 mg daily for week 1. Then increase by 5 mg every week till on 10 mg twice a day- as tolerated.  -     donepezil (ARICEPT) 10 MG tablet; Take 1 tablet (10 mg) by mouth at bedtime    Return in about 1 year (around 4/3/2025) for In-Clinic Visit (must).    Over 31 minutes were spent coordinating the care for the patient on the day of the encounter.  This includes previsit, during visit and post visit activities as documented above.  Counseling patient and family.  Prescription management.  Answering questions.  (Activities include but not inclusive of reviewing chart, reviewing outside records, reviewing labs and imaging study results as well as the images, patient visit time including getting history and exam,  use if applicable, review of test results with the patient and coming up with a plan in a shared model, counseling patient and family, education and answering patient questions, EMR , EMR diagnosis entry and problem list management, medication reconciliation and prescription management if applicable, paperwork if applicable, printing documents and documentation of the visit activities.)        Nuno Sams MD  Neurologist  [facility]  Tel:- [phone]    This note was dictated using voice recognition software.  Any  grammatical or context distortions are unintentional and inherent to the software.      Again, thank you for allowing me to participate in the care of your patient.        Sincerely,        Nuno Sams MD

## 2024-04-23 ENCOUNTER — PATIENT OUTREACH (OUTPATIENT)
Dept: ONCOLOGY | Facility: HOSPITAL | Age: 79
End: 2024-04-23
Payer: COMMERCIAL

## 2024-05-16 ENCOUNTER — TELEPHONE (OUTPATIENT)
Dept: NEUROLOGY | Facility: CLINIC | Age: 79
End: 2024-05-16
Payer: COMMERCIAL

## 2024-05-16 NOTE — TELEPHONE ENCOUNTER
Health Call Center    Phone Message    May a detailed message be left on voicemail: yes     Reason for Call: Medication Question or concern regarding medication   Prescription Clarification  Name of Medication: memantine (NAMENDA) 5 MG tablet     Prescribing Provider: Dr. Sanchez     Pharmacy: [facility] #[id number] - [address]     What on the order needs clarification? Pt spouse has questions regarding medication and condition.    Please return pt spouse call at [phone].    Action Taken: Message routed to:  Other: [facility]    Travel Screening: Not Applicable

## 2024-05-17 NOTE — TELEPHONE ENCOUNTER
Called back and spoke with patient's spouse (Jony - [facility] on file), to address concerns related to patients memory issues. They state that memory issues are not acute in nature, but ongoing for patient, spouse wondering if  patient's medications are beneficial (Donepezil and Namenda).    Writer discussed/educated that these medications help to preserve cognition, but can't reverse symptoms.    Spouse wondering what to do to help the patient. Writer advised that helping could involve assisting with management of day to day routines (socially active, maintaining physical activity and health diet). Also directed him to [employer] website to find caregiver help and support.    Ultimately, Jony appreciated the call back and discussion with education about interventions.    Writer encouraged him to call back with any future questions or concerns.    Blue Daly RN, BSN  [facility]

## 2024-05-20 ENCOUNTER — ONCOLOGY VISIT (OUTPATIENT)
Dept: ONCOLOGY | Facility: HOSPITAL | Age: 79
End: 2024-05-20
Attending: NURSE PRACTITIONER
Payer: COMMERCIAL

## 2024-05-20 VITALS
SYSTOLIC BLOOD PRESSURE: 141 MMHG | DIASTOLIC BLOOD PRESSURE: 70 MMHG | HEIGHT: 64 IN | WEIGHT: 116.9 LBS | TEMPERATURE: 98 F | BODY MASS INDEX: 19.96 KG/M2 | HEART RATE: 75 BPM | OXYGEN SATURATION: 98 % | RESPIRATION RATE: 16 BRPM

## 2024-05-20 DIAGNOSIS — Z17.0 MALIGNANT NEOPLASM OF OVERLAPPING SITES OF LEFT BREAST IN FEMALE, ESTROGEN RECEPTOR POSITIVE (H): ICD-10-CM

## 2024-05-20 DIAGNOSIS — C50.812 MALIGNANT NEOPLASM OF OVERLAPPING SITES OF LEFT BREAST IN FEMALE, ESTROGEN RECEPTOR POSITIVE (H): ICD-10-CM

## 2024-05-20 DIAGNOSIS — C50.919 MALIGNANT NEOPLASM OF FEMALE BREAST, UNSPECIFIED ESTROGEN RECEPTOR STATUS, UNSPECIFIED LATERALITY, UNSPECIFIED SITE OF BREAST (H): Primary | ICD-10-CM

## 2024-05-20 DIAGNOSIS — Z12.31 ENCOUNTER FOR SCREENING MAMMOGRAM FOR MALIGNANT NEOPLASM OF BREAST: ICD-10-CM

## 2024-05-20 PROCEDURE — 99213 OFFICE O/P EST LOW 20 MIN: CPT | Performed by: NURSE PRACTITIONER

## 2024-05-20 PROCEDURE — G0463 HOSPITAL OUTPT CLINIC VISIT: HCPCS | Performed by: NURSE PRACTITIONER

## 2024-05-20 ASSESSMENT — PAIN SCALES - GENERAL: PAINLEVEL: NO PAIN (0)

## 2024-05-20 NOTE — PROGRESS NOTES
"Oncology Rooming Note    May 20, 2024 8:56 AM   Norma Paul is a 79 year old female who presents for:    Chief Complaint   Patient presents with    Oncology Clinic Visit     1 year return visit related to Malignant neoplasm of female breast, unspecified estrogen receptor status, unspecified laterality, unspecified site of breast.     Initial Vitals: BP (!) 141/70 (BP Location: Left arm, Patient Position: Sitting, Cuff Size: Adult Regular)   Pulse 75   Temp 98  F (36.7  C) (Oral)   Resp 16   Ht 1.626 m (5' 4\")   Wt 53 kg (116 lb 14.4 oz)   SpO2 98%   BMI 20.07 kg/m   Estimated body mass index is 20.07 kg/m  as calculated from the following:    Height as of this encounter: 1.626 m (5' 4\").    Weight as of this encounter: 53 kg (116 lb 14.4 oz). Body surface area is 1.55 meters squared.  No Pain (0) Comment: Data Unavailable   No LMP recorded. Patient is postmenopausal.  Allergies reviewed: Yes  Medications reviewed: Yes    Medications: Medication refills not needed today.  Pharmacy name entered into [employer]:    [id number] - [place], [place] - [address]  [id number] - [place] - [address] [facility] [address] [facility]) [facility] - [place], [place] - [address] AT [place]  [facility] [place] [address] [facility] [place]    Frailty Screening:   Is the patient here for a new oncology consult visit in cancer care? 2. No      Clinical concerns: Spouse, concerns with weight loss since new dx of Alzheimer's/Dementia since 3/2023. Has discussed w/Norma's cardiologist that weight loss should not go below 130lbs. Down to 116/9 today.  States pt is eating some but appetite has decreased. Declined nutritionist at this time, but would like to discuss further with Ammy.  Ammy was notified.      Jasmin Liu, TRICIA              "

## 2024-05-20 NOTE — PROGRESS NOTES
[facility] Progress Note    Patient: Norma Paul  MRN: [id number]  Date of Service: May 20, 2024          Reason for Visit    Chief Complaint   Patient presents with    Oncology Clinic Visit     1 year return visit related to Malignant neoplasm of female breast, unspecified estrogen receptor status, unspecified laterality, unspecified site of breast.       Assessment and Plan     Cancer Staging   Malignant neoplasm of overlapping sites of left breast in female, estrogen receptor positive (H)  Staging form: Breast, AJCC 7th Edition  - Pathologic: Stage IA (T1c, N0, cM0) - Signed by Ammy Wilkerson APRN CNP on 11/17/2021  ER Status: Positive  OR Status: Positive  HER2 Status: Negative      1.  History of breast cancer, left side: stage IA. . Oncotype 12. She was taking anastrozole and tolerated quite well.  Started January 2017.  Completed in Feb. 2022. Mammogram in October was normal, will continue that annually.   No need for further appt in Oncology clinic. Encourage her to see PCP annually for exams and continue mammograms annually if she is good enough shape to get treatment if they find something.      2. Alzheimers: formally diagnosed in 2023. On medications, aricept and namenda. Continue to follow with neurology.     ECOG Performance    0 - Independent    Distress Screening (within last 30 days)    1. How concerned are you about your ability to eat? : 0  2. How concerned are you about unintended weight loss or your current weight? : 0  3. How concerned are you about feeling depressed or very sad? : 0  4. How concerned are you about feeling anxious or very scared? : 0  5. Do you struggle with the loss of meaning and jose alejandro in your life? : Not at all  6. How concerned are you about work and home life issues that may be affected by your cancer? : 0  7. How concerned are you about knowing what resources are available to help you? : 0  8. Do you currently have what you would  "describe as Zoroastrianism or spiritual struggles?            : Not at all       Pain  Pain Score: No Pain (0)    Problem List    Patient Active Problem List   Diagnosis    Malignant neoplasm of overlapping sites of left breast in female, estrogen receptor positive (H)    Urinary retention    Hyponatremia    JOE (acute kidney injury) (H24)    Leukocytosis    Urinary tract infection    Uterine prolapse    Hydronephrosis    Sepsis (H)    Dehydration    Renal stone    Pelvic abscess in female    Diverticulitis of large intestine with perforation and abscess without bleeding    Hypomagnesemia    Hypokalemia    Slow transit constipation    Diverticulitis    Aromatase inhibitor use    Vaginal vault prolapse        ______________________________________________________________________________    History of Present Illness    Measurable disease: None postoperatively     Current therapy: Anastrozole 1 mg by mouth daily started January 1, 2017.  Completed February, 2022     Treatment history: Left lumpectomy and then adjuvant radiation therapy, 16 fractions completed December 2016     Interim History: pt is here today for follow up visit. She is doing well.  Has been diagnosed with alzheimers. Slowly getting worse. Has also been losing weight. Eating less. Per , he feels like her alzheimers meds are contributing.     Review of Systems    Pertinent items are noted in HPI.    Past History    Past Medical History:   Diagnosis Date    Breast cancer (H) 2016    right ca    Cystocele with prolapse     Hx of radiation therapy     Hyperlipidemia     Indwelling catheter present on admission     Prolapse, uterovaginal        PHYSICAL EXAM  BP (!) 141/70 (BP Location: Left arm, Patient Position: Sitting, Cuff Size: Adult Regular)   Pulse 75   Temp 98  F (36.7  C) (Oral)   Resp 16   Ht 1.626 m (5' 4\")   Wt 53 kg (116 lb 14.4 oz)   SpO2 98%   BMI 20.07 kg/m      GENERAL: no acute distress. Cooperative in conversation. Here with " . Mask on  RESP: Regular respiratory rate. No expiratory wheezes   MUSCULOSKELETAL: no bilateral leg swelling  NEURO: non focal. Alert and oriented x3.   PSYCH: within normal limits. No depression or anxiety.  SKIN: exposed skin is dry intact.   BREAST: Bilateral exam done.  Lumpectomy incision is well-healed.  Very slight scarring in right breast.  No abnormal lesions or rashes noted.  No evidence for local recurrence bilaterally.      Lab Results    No results found for this or any previous visit (from the past 168 hour(s)).      Imaging    ECHO TRANSTHORACIC COMPLETE    Result Date: 2024   [address], [place] [address]  Main: [phone]                                              Transthoracic Echo Report  MARIA TERESA CANO  Bebetobhakti ID: [id number] Age: 79 : 1945 Ordering Provider: JERRI MONZON  Exam Date: 2024 08:55 Gender: F Sonographer: DEEP  Accession #: [id number] Height: 66.9 in BSA: 1.68 m  BP: 122 / 82  Weight: 130 lbs BMI: 20.4 kg/m  HR: 67  Location: [facility] Rhythm: Normal Sinus Rhythm  Procedure Components: 2D imaging, Color Doppler, Spectral Doppler  Indications: Hyperlipidemia, unspecified hyperlipidemia type; Coronary artery disease, unspecified vessel or lesion type, unspecified  whether angina present, unspecified whether native or transplanted heart  Technical Quality: Adequate, Fair Contrast: None  Final Conclusion  1. Normal left ventricular chamber size. Normal left ventricular wall thickness. Normal left ventricular systolic function. Calculated left  ventricular ejection fraction (modified Sanchez technique) is 55%.  2. Normal right ventricular chamber size. Normal right ventricular systolic function.  3. Trileaflet aortic valve. Aortic valve sclerosis without stenosis. Mild aortic valve regurgitation. Aortic regurgitation  pressure half-  time (PHT) is 580 ms.  4. Mild ascending aorta dilatation indexed dimension (4.3 cm, 2.5 cm/m ).  5. When compared to the previous echocardiographic images of 4/2/21 , there has been no significant change.  Estimated EF: 55-60%  FINDINGS  Left Ventricle Normal left ventricular chamber size. Normal left ventricular wall thickness. Normal left ventricular systolic  function. Calculated left ventricular ejection fraction (modified Sanchez technique) is 55%. No regional wall  motion abnormalities. Anomalous left ventricular apical chord.  Diastolic Function Grade 1 left ventricular diastolic dysfunction consistent with abnormal myocardial relaxation and normal left  ventricular filling pressure.  Right Ventricle Normal right ventricular chamber size. Normal right ventricular systolic function. Estimated right ventricular  systolic pressure is 23 mmHg.  Left Atrium Normal left atrial size. Left atrial volume index is 28 ml/m .  Right Atrium Normal right atrial size.  Atrial Septum No evidence of inter-atrial shunt by color flow Doppler.  Aortic Valve Trileaflet aortic valve. Aortic valve sclerosis without stenosis. Mild aortic valve regurgitation. Aortic  regurgitation pressure half-time (PHT) is 580 ms.  Mitral Valve Mildly thickened mitral valve. No mitral valve stenosis. Trivial mitral valve regurgitation.  Tricuspid Valve Normal tricuspid valve. No tricuspid valve stenosis. Mild tricuspid valve regurgitation.  Pulmonic Valve Pulmonary valve was not well visualized. Grossly normal pulmonary valve. No pulmonary valve stenosis. Trivial  pulmonary valve regurgitation.  Pericardium No pericardial effusion.  Aorta Aortic sinus of Valsalva is normal in size (3.9 cm, ZScore = 1.7). Mild ascending aorta dilatation indexed  dimension (4.3 cm, 2.5 cm/m ).  Inferior Vena Cava Normal inferior vena cava with normal inspiratory collapse.  MEASUREMENTS  (Male / Female) Normal Values  2D MEASUREMENTS AND LV FUNCTION   IVS Diastolic Thickness           0.861 cm              < 1.1 cm / < 1.0 cm  LV Diastolic Diameter PLAX        4.49 cm               4.2 - 5.9 / 3.9 - 5.3 cm  LV Diastolic Diameter Index       2.67 cm/m   LVPW Diastolic Thickness          0.861 cm              < 1.1 cm / < 1.0 cm  LV Systolic Diameter PLAX         3.16 cm  LV Systolic Diameter Index        1.88 cm/m   LVOT Diameter                     2.18 cm  LVOT Cardiac Output               5.45 l/min  LVOT Cardiac Index                3.27 l/min m   LVOT Stroke Volume                81.4 ml  Stroke Volume Index               48.9 ml/m   LV Ejection Fraction MOD BP       55.2 %                >= 55  %  LA Volume MOD BP                  44.9 ml  LA Volume Index MOD BP            26.7 ml/m             16 - 34 ml/m   RV Diastolic Basal Diameter       3.44 cm  RV Diastolic Mid Diameter         2.51 cm  LV Mass                           125 g  LV Mass Index                     74.7 g/m   Sinuses of Valsalva Diameter(d)   3.86 cm  Ascending Aorta Diameter(s)       4.26 cm  IVC Diameter Expiration           0.999 cm  Ascending Aorta Index             2.53 cm/m   M MODE  TAPSE MM                          1.81 cm  DIASTOLOGY  Mitral E Point Velocity           0.501 m/sec           0.70 - 1.02 m/sec  Mitral A Point Velocity           0.829 m/sec           0.06 - 1.06 m/sec  Mitral E to A Ratio               0.604                 1.1 - 2.1  MV Deceleration Time              243 msec              167 - 231 msec  LV E' Lateral Velocity            0.0618 m/sec  Mitral E to LV E' Lateral Ratio   8.11  LV E' Septal Velocity             0.0325 m/sec  Mitral E to LV E' Septal Ratio    15.4  AORTIC VALVE  AV Peak Velocity                  1.09 m/sec            < 2.0 m/sec  AV Peak Gradient                  4.75 mmHg  AV Mean Gradient                  3.55 mmHg  AV Velocity Time Integral         24.6 cm  LVOT Peak Velocity                1.03 m/sec  LVOT Velocity Time Integral        21.8 cm  AV Area Cont Eq vti               3.31 cm   AV Area Cont Eq pk                3.53 cm   AV Dimensionless Index            0.886  AI Pressure Half Time             580 msec  TRICUSPID VALVE AND ESTIMATED PRESSURES  TR Peak Velocity                  2.24 m/sec  TR Peak Gradient                  20 mmHg  Right Atrial Pressure             3 mmHg  Right Ventricular Systolic Press  23 mmHg  HCM DATA  LVOT MARIXA (r)                      4.24 mmHg  Aortic Root ZScore: 1.84  Melvina Sosa MD  (Electronically Signed) [facility] Accredited Site  Final Date: 22 April 2024 12:49  ICD-10 Codes: E78.5; I25.10       Signed by: CRYSTAL Miranda CNP

## 2024-05-20 NOTE — LETTER
5/20/2024         RE: Norma Paul  [address]  [facility] [address]        Dear Colleague,    Thank you for referring your patient, Norma Paul, to the [facility] [place]. Please see a copy of my visit note below.    [facility] Hematology and Oncology Progress Note    Patient: Norma Paul  MRN: [id number]  Date of Service: May 20, 2024          Reason for Visit    Chief Complaint   Patient presents with     Oncology Clinic Visit     1 year return visit related to Malignant neoplasm of female breast, unspecified estrogen receptor status, unspecified laterality, unspecified site of breast.       Assessment and Plan     Cancer Staging   Malignant neoplasm of female breast, unspecified estrogen receptor status, unspecified laterality, unspecified site of breast (H)  Staging form: Breast, AJCC 7th Edition  - Pathologic: Stage IA (T1c, N0, cM0) - Signed by Ammy Wilkerson APRN CNP on 11/17/2021  ER Status: Positive  [place] Status: Positive  HER2 Status: Negative      1.  History of breast cancer: stage IA. . Oncotype 12. She was taking anastrozole and tolerated quite well.  Started January 2017.  Completed in Feb. 2022. Mammogram in October was normal, will continue that annually.   No need for further appt in Oncology clinic. Encourage her to see PCP annually for exams and continue mammograms annually if she is good enough shape to get treatment if they find something.      2. Alzheimers: formally diagnosed in 2023. On medications, aricept and namenda. Continue to follow with neurology.     ECOG Performance    0 - Independent    Distress Screening (within last 30 days)    1. How concerned are you about your ability to eat? : 0  2. How concerned are you about unintended weight loss or your current weight? : 0  3. How concerned are you about feeling depressed or very sad? : 0  4. How concerned are you about feeling anxious or very scared? : 0  5. Do you struggle with the loss of  meaning and jose alejandro in your life? : Not at all  6. How concerned are you about work and home life issues that may be affected by your cancer? : 0  7. How concerned are you about knowing what resources are available to help you? : 0  8. Do you currently have what you would describe as Caodaism or spiritual struggles?            : Not at all       Pain  Pain Score: No Pain (0)    Problem List    Patient Active Problem List   Diagnosis     Malignant neoplasm of female breast, unspecified estrogen receptor status, unspecified laterality, unspecified site of breast (H)     Urinary retention     Hyponatremia     JOE (acute kidney injury) (H24)     Leukocytosis     Urinary tract infection     Uterine prolapse     Hydronephrosis     Sepsis (H)     Dehydration     Renal stone     Pelvic abscess in female     Diverticulitis of large intestine with perforation and abscess without bleeding     Hypomagnesemia     Hypokalemia     Slow transit constipation     Diverticulitis     Aromatase inhibitor use     Vaginal vault prolapse        ______________________________________________________________________________    History of Present Illness    Measurable disease: None postoperatively     Current therapy: Anastrozole 1 mg by mouth daily started January 1, 2017.  Completed February, 2022     Treatment history: Left lumpectomy and then adjuvant radiation therapy, 16 fractions completed December 2016     Interim History: pt is here today for follow up visit. She is doing well.  Has been diagnosed with alzheimers. Slowly getting worse. Has also been losing weight. Eating less. Per , he feels like her alzheimers meds are contributing.     Review of Systems    Pertinent items are noted in HPI.    Past History    Past Medical History:   Diagnosis Date     Breast cancer (H) 2016    right ca     Cystocele with prolapse      Hx of radiation therapy      Hyperlipidemia      Indwelling catheter present on admission      Prolapse,  "uterovaginal        PHYSICAL EXAM  BP (!) 141/70 (BP Location: Left arm, Patient Position: Sitting, Cuff Size: Adult Regular)   Pulse 75   Temp 98  F (36.7  C) (Oral)   Resp 16   Ht 1.626 m (5' 4\")   Wt 53 kg (116 lb 14.4 oz)   SpO2 98%   BMI 20.07 kg/m      GENERAL: no acute distress. Cooperative in conversation. Here with . Mask on  RESP: Regular respiratory rate. No expiratory wheezes   MUSCULOSKELETAL: no bilateral leg swelling  NEURO: non focal. Alert and oriented x3.   PSYCH: within normal limits. No depression or anxiety.  SKIN: exposed skin is dry intact.   BREAST: Bilateral exam done.  Lumpectomy incision is well-healed.  Very slight scarring in right breast.  No abnormal lesions or rashes noted.  No evidence for local recurrence bilaterally.      Lab Results    No results found for this or any previous visit (from the past 168 hour(s)).      Imaging    ECHO TRANSTHORACIC COMPLETE    Result Date: 2024   [address], [place] [address]  Main: [phone]                                              Transthoracic Echo Report  MARIA TERESA CANO ID: [id number] Age: 79 : 1945 Ordering Provider: JERRI MONZON  Exam Date: 2024 08:55 Gender: F Sonographer: DEEP  Accession #: [id number] Height: 66.9 in BSA: 1.68 m  BP: 122 / 82  Weight: 130 lbs BMI: 20.4 kg/m  HR: 67  Location: RUST Rhythm: Normal Sinus Rhythm  Procedure Components: 2D imaging, Color Doppler, Spectral Doppler  Indications: Hyperlipidemia, unspecified hyperlipidemia type; Coronary artery disease, unspecified vessel or lesion type, unspecified  whether angina present, unspecified whether native or transplanted heart  Technical Quality: Adequate, Fair Contrast: None  Final Conclusion  1. Normal left ventricular chamber size. Normal left ventricular wall thickness. Normal " left ventricular systolic function. Calculated left  ventricular ejection fraction (modified Sanchez technique) is 55%.  2. Normal right ventricular chamber size. Normal right ventricular systolic function.  3. Trileaflet aortic valve. Aortic valve sclerosis without stenosis. Mild aortic valve regurgitation. Aortic regurgitation pressure half-  time (PHT) is 580 ms.  4. Mild ascending aorta dilatation indexed dimension (4.3 cm, 2.5 cm/m ).  5. When compared to the previous echocardiographic images of 4/2/21 , there has been no significant change.  Estimated EF: 55-60%  FINDINGS  Left Ventricle Normal left ventricular chamber size. Normal left ventricular wall thickness. Normal left ventricular systolic  function. Calculated left ventricular ejection fraction (modified Sanchez technique) is 55%. No regional wall  motion abnormalities. Anomalous left ventricular apical chord.  Diastolic Function Grade 1 left ventricular diastolic dysfunction consistent with abnormal myocardial relaxation and normal left  ventricular filling pressure.  Right Ventricle Normal right ventricular chamber size. Normal right ventricular systolic function. Estimated right ventricular  systolic pressure is 23 mmHg.  Left Atrium Normal left atrial size. Left atrial volume index is 28 ml/m .  Right Atrium Normal right atrial size.  Atrial Septum No evidence of inter-atrial shunt by color flow Doppler.  Aortic Valve Trileaflet aortic valve. Aortic valve sclerosis without stenosis. Mild aortic valve regurgitation. Aortic  regurgitation pressure half-time (PHT) is 580 ms.  Mitral Valve Mildly thickened mitral valve. No mitral valve stenosis. Trivial mitral valve regurgitation.  Tricuspid Valve Normal tricuspid valve. No tricuspid valve stenosis. Mild tricuspid valve regurgitation.  Pulmonic Valve Pulmonary valve was not well visualized. Grossly normal pulmonary valve. No pulmonary valve stenosis. Trivial  pulmonary valve regurgitation.  Pericardium  No pericardial effusion.  Aorta Aortic sinus of Valsalva is normal in size (3.9 cm, ZScore = 1.7). Mild ascending aorta dilatation indexed  dimension (4.3 cm, 2.5 cm/m ).  Inferior Vena Cava Normal inferior vena cava with normal inspiratory collapse.  MEASUREMENTS  (Male / Female) Normal Values  2D MEASUREMENTS AND LV FUNCTION  IVS Diastolic Thickness           0.861 cm              < 1.1 cm / < 1.0 cm  LV Diastolic Diameter PLAX        4.49 cm               4.2 - 5.9 / 3.9 - 5.3 cm  LV Diastolic Diameter Index       2.67 cm/m   LVPW Diastolic Thickness          0.861 cm              < 1.1 cm / < 1.0 cm  LV Systolic Diameter PLAX         3.16 cm  LV Systolic Diameter Index        1.88 cm/m   LVOT Diameter                     2.18 cm  LVOT Cardiac Output               5.45 l/min  LVOT Cardiac Index                3.27 l/min m   LVOT Stroke Volume                81.4 ml  Stroke Volume Index               48.9 ml/m   LV Ejection Fraction MOD BP       55.2 %                >= 55  %  LA Volume MOD BP                  44.9 ml  LA Volume Index MOD BP            26.7 ml/m             16 - 34 ml/m   RV Diastolic Basal Diameter       3.44 cm  RV Diastolic Mid Diameter         2.51 cm  LV Mass                           125 g  LV Mass Index                     74.7 g/m   Sinuses of Valsalva Diameter(d)   3.86 cm  Ascending Aorta Diameter(s)       4.26 cm  IVC Diameter Expiration           0.999 cm  Ascending Aorta Index             2.53 cm/m   M MODE  TAPSE MM                          1.81 cm  DIASTOLOGY  Mitral E Point Velocity           0.501 m/sec           0.70 - 1.02 m/sec  Mitral A Point Velocity           0.829 m/sec           0.06 - 1.06 m/sec  Mitral E to A Ratio               0.604                 1.1 - 2.1  MV Deceleration Time              243 msec              167 - 231 msec  LV E' Lateral Velocity            0.0618 m/sec  Mitral E to LV E' Lateral Ratio   8.11  LV E' Septal Velocity             0.0325 m/sec   "Mitral E to LV E' Septal Ratio    15.4  AORTIC VALVE  AV Peak Velocity                  1.09 m/sec            < 2.0 m/sec  AV Peak Gradient                  4.75 mmHg  AV Mean Gradient                  3.55 mmHg  AV Velocity Time Integral         24.6 cm  LVOT Peak Velocity                1.03 m/sec  LVOT Velocity Time Integral       21.8 cm  AV Area Cont Eq vti               3.31 cm   AV Area Cont Eq pk                3.53 cm   AV Dimensionless Index            0.886  AI Pressure Half Time             580 msec  TRICUSPID VALVE AND ESTIMATED PRESSURES  TR Peak Velocity                  2.24 m/sec  TR Peak Gradient                  20 mmHg  Right Atrial Pressure             3 mmHg  Right Ventricular Systolic Press  23 mmHg  HCM DATA  LVOT MARIXA (r)                      4.24 mmHg  Aortic Root ZScore: 1.84  Melvina Sosa MD  (Electronically Signed) [facility] Accredited [facility]  Final Date: 22 April 2024 12:49  ICD-10 Codes: E78.5; I25.10       Signed by: CRYSTAL Miranda CNP    Oncology Rooming Note    May 20, 2024 8:56 AM   Norma Paul is a 79 year old female who presents for:    Chief Complaint   Patient presents with     Oncology Clinic Visit     1 year return visit related to Malignant neoplasm of female breast, unspecified estrogen receptor status, unspecified laterality, unspecified site of breast.     Initial Vitals: BP (!) 141/70 (BP Location: Left arm, Patient Position: Sitting, Cuff Size: Adult Regular)   Pulse 75   Temp 98  F (36.7  C) (Oral)   Resp 16   Ht 1.626 m (5' 4\")   Wt 53 kg (116 lb 14.4 oz)   SpO2 98%   BMI 20.07 kg/m   Estimated body mass index is 20.07 kg/m  as calculated from the following:    Height as of this encounter: 1.626 m (5' 4\").    Weight as of this encounter: 53 kg (116 lb 14.4 oz). Body surface area is 1.55 meters squared.  No Pain (0) Comment: Data Unavailable   No LMP recorded. Patient is postmenopausal.  Allergies reviewed: Yes  Medications reviewed: " Yes    Medications: Medication refills not needed today.  Pharmacy name entered into EPIC:    [id number] - [place], [place] - [address]  [id number] - [place], [place] - [address] AT [facility]  [facility] [place] - [address] AT [place] & [address]  [facility] [place], [place] - [address] [facility]    Frailty Screening:   Is the patient here for a new oncology consult visit in cancer care? 2. No      Clinical concerns: Spouse, concerns with weight loss since new dx of Alzheimer's/Dementia since 3/2023. Has discussed w/Norma's cardiologist that weight loss should not go below 130lbs. Down to 116/9 today.  States pt is eating some but appetite has decreased. Declined nutritionist at this time, but would like to discuss further with Ammy.  Ammy was notified.      Jasmin Liu CMA                Again, thank you for allowing me to participate in the care of your patient.        Sincerely,        Ammy Wilkerson, CRYSTAL CNP

## 2024-06-04 ENCOUNTER — LAB REQUISITION (OUTPATIENT)
Dept: LAB | Facility: CLINIC | Age: 79
End: 2024-06-04

## 2024-06-04 DIAGNOSIS — R39.9 UNSPECIFIED SYMPTOMS AND SIGNS INVOLVING THE GENITOURINARY SYSTEM: ICD-10-CM

## 2024-06-04 PROCEDURE — 87086 URINE CULTURE/COLONY COUNT: CPT | Performed by: PHYSICIAN ASSISTANT

## 2024-06-04 PROCEDURE — 87186 SC STD MICRODIL/AGAR DIL: CPT | Performed by: PHYSICIAN ASSISTANT

## 2024-06-05 LAB — BACTERIA UR CULT: ABNORMAL

## 2024-07-23 ENCOUNTER — LAB REQUISITION (OUTPATIENT)
Dept: LAB | Facility: CLINIC | Age: 79
End: 2024-07-23
Payer: COMMERCIAL

## 2024-07-23 DIAGNOSIS — R63.4 ABNORMAL WEIGHT LOSS: ICD-10-CM

## 2024-07-23 DIAGNOSIS — G30.1 ALZHEIMER'S DISEASE WITH LATE ONSET (CODE) (H): ICD-10-CM

## 2024-07-23 DIAGNOSIS — R30.0 DYSURIA: ICD-10-CM

## 2024-07-23 DIAGNOSIS — E78.2 MIXED HYPERLIPIDEMIA: ICD-10-CM

## 2024-07-23 LAB
ANION GAP SERPL CALCULATED.3IONS-SCNC: 8 MMOL/L (ref 7–15)
BUN SERPL-MCNC: 22.8 MG/DL (ref 8–23)
CALCIUM SERPL-MCNC: 9.4 MG/DL (ref 8.8–10.4)
CHLORIDE SERPL-SCNC: 102 MMOL/L (ref 98–107)
CHOLEST SERPL-MCNC: 219 MG/DL
CREAT SERPL-MCNC: 0.76 MG/DL (ref 0.51–0.95)
EGFRCR SERPLBLD CKD-EPI 2021: 79 ML/MIN/1.73M2
FASTING STATUS PATIENT QL REPORTED: ABNORMAL
FASTING STATUS PATIENT QL REPORTED: ABNORMAL
GLUCOSE SERPL-MCNC: 103 MG/DL (ref 70–99)
HCO3 SERPL-SCNC: 30 MMOL/L (ref 22–29)
HDLC SERPL-MCNC: 61 MG/DL
LDLC SERPL CALC-MCNC: 134 MG/DL
NONHDLC SERPL-MCNC: 158 MG/DL
POTASSIUM SERPL-SCNC: 4.4 MMOL/L (ref 3.4–5.3)
SODIUM SERPL-SCNC: 140 MMOL/L (ref 135–145)
TRIGL SERPL-MCNC: 119 MG/DL
TSH SERPL DL<=0.005 MIU/L-ACNC: 1.18 UIU/ML (ref 0.3–4.2)

## 2024-07-23 PROCEDURE — 80048 BASIC METABOLIC PNL TOTAL CA: CPT | Performed by: FAMILY MEDICINE

## 2024-07-23 PROCEDURE — 87086 URINE CULTURE/COLONY COUNT: CPT | Performed by: FAMILY MEDICINE

## 2024-07-23 PROCEDURE — 84443 ASSAY THYROID STIM HORMONE: CPT | Performed by: FAMILY MEDICINE

## 2024-07-23 PROCEDURE — 80061 LIPID PANEL: CPT | Performed by: FAMILY MEDICINE

## 2024-07-23 PROCEDURE — 87186 SC STD MICRODIL/AGAR DIL: CPT | Performed by: FAMILY MEDICINE

## 2024-07-24 ENCOUNTER — PATIENT OUTREACH (OUTPATIENT)
Dept: CARE COORDINATION | Facility: CLINIC | Age: 79
End: 2024-07-24
Payer: COMMERCIAL

## 2024-07-24 LAB — BACTERIA UR CULT: ABNORMAL

## 2024-07-24 NOTE — PROGRESS NOTES
Clinic Care Coordination Contact    Patient identified for care management outreach, however patient is not on a value based contract so cannot complete outreach. Will escalate to clinic staff if specific needs or resources are indicated.     Ninfa Ardon, [facility]  Social Work Care Coordinator  Phone: [phone]

## 2024-08-05 ENCOUNTER — TELEPHONE (OUTPATIENT)
Dept: NEUROLOGY | Facility: CLINIC | Age: 79
End: 2024-08-05
Payer: COMMERCIAL

## 2024-08-05 NOTE — TELEPHONE ENCOUNTER
[facility] Call Center    Phone Message    May a detailed message be left on voicemail: yes     Reason for Call: Medication Question or concern regarding medication   Prescription Clarification  Name of Medication: memantine (NAMENDA) 5 MG tablet    Prescribing Provider: Dr. Sams      What on the order needs clarification? Pt spouse has questing side effects and stated Nomra has become constipated and is losing weight.  Jony is asking how can he tell if medication is working       Action Taken: Other: Neurology     Travel Screening: Not Applicable     Date of Service:

## 2024-08-06 NOTE — TELEPHONE ENCOUNTER
RN returned call to patients spouse. He had several questions about memantine, how to know if medication is effective.     We discussed that the goal of the mediation is to preserve current cognitive function, but that disease will continue to progress at an unknown rate despite taking medication. He verbalizes understanding.     He is asking about constipation, asking if side effect to Memantine. We discussed that constipation is a side effect with many medications. Should work with PCP to discuss further and come up with plan. RN also advised incorporating more hydration and fiber into diet. He reports that her water intake during day is minimal.     Advised scheduling pcp appt to discuss further. He is agreeable.     Also provided alzheimer's association website as resource for education and future planning tools.     Jorje Alberto RN, BSN  [facility] Neurology

## 2024-08-16 ENCOUNTER — TELEPHONE (OUTPATIENT)
Dept: NEUROLOGY | Facility: CLINIC | Age: 79
End: 2024-08-16
Payer: COMMERCIAL

## 2024-08-16 NOTE — TELEPHONE ENCOUNTER
[facility] Call Center    Phone Message    May a detailed message be left on voicemail: yes     Reason for Call: Medication Question or concern regarding medication   Prescription Clarification  Name of Medication: All Prescribed Medication from Dr. Sams  Prescribing Provider: Nuno Sams MD   Pharmacy: [id number] [address]   What on the order needs clarification? Jony Pt spouse is asking for clarifications on all medications that are prescribed by Dr. Sams    Please contact Jony at: [phone]      Action Taken: Other: Neurology     Travel Screening: Not Applicable     Date of Service:

## 2024-08-16 NOTE — TELEPHONE ENCOUNTER
Called and spoke with Jony who is questioning Evy medications/dosages.   Discussed she should be taking     Namenda 10mg BID  Aricept 10mg at bedtime    He is questioning vitamin b12 which Dr. Sams had suggested in the past and prescribed. According to chart review, PCP last checked a level (5/16/23 476)  He is questioning whether Dr. Sams is in charge of the vitamin b12 dosage or Dr. Vu as he reports she is suggesting a different dose other than 1000mcg every day. He will follow up with PCP regarding how much Vitamin b12 Norma should be taking. He will also direct other vitamins/OTC medications with PCP.    Judi Galan CMA on 8/16/2024 at 2:22 PM  [facility]

## 2024-09-23 ENCOUNTER — LAB REQUISITION (OUTPATIENT)
Dept: LAB | Facility: CLINIC | Age: 79
End: 2024-09-23

## 2024-09-23 DIAGNOSIS — R39.9 UNSPECIFIED SYMPTOMS AND SIGNS INVOLVING THE GENITOURINARY SYSTEM: ICD-10-CM

## 2024-09-23 PROCEDURE — 87086 URINE CULTURE/COLONY COUNT: CPT | Performed by: FAMILY MEDICINE

## 2024-09-24 LAB — BACTERIA UR CULT: ABNORMAL

## 2024-09-26 DIAGNOSIS — F02.80 MAJOR NEUROCOGNITIVE DISORDER DUE TO ALZHEIMER'S DISEASE (H): Primary | ICD-10-CM

## 2024-09-26 DIAGNOSIS — G30.9 MAJOR NEUROCOGNITIVE DISORDER DUE TO ALZHEIMER'S DISEASE (H): Primary | ICD-10-CM

## 2024-09-26 RX ORDER — MEMANTINE HYDROCHLORIDE 10 MG/1
10 TABLET ORAL 2 TIMES DAILY
Qty: 180 TABLET | Refills: 2 | Status: SHIPPED | OUTPATIENT
Start: 2024-09-26

## 2024-09-26 NOTE — TELEPHONE ENCOUNTER
Refill request for: memantine 10mg   Directions: Take 10 mg twice a day     LOV: 04/03/24  NOV: Due for follow up April 2025.    Requesting rx for the 10mg tabs. Pt of Dr. Sams. He is out of the office until 9/30/24. Can you refill in his absence?    90 day supply with 2 refills Medication T'd for review and signature    Wendy Enrique LPN on 9/26/2024 at 2:34 PM

## 2024-10-15 ENCOUNTER — LAB REQUISITION (OUTPATIENT)
Dept: LAB | Facility: CLINIC | Age: 79
End: 2024-10-15

## 2024-10-15 DIAGNOSIS — Z87.440 PERSONAL HISTORY OF URINARY (TRACT) INFECTIONS: ICD-10-CM

## 2024-10-15 PROCEDURE — 87086 URINE CULTURE/COLONY COUNT: CPT | Performed by: FAMILY MEDICINE

## 2024-10-16 LAB — BACTERIA UR CULT: NORMAL

## 2024-10-31 ENCOUNTER — ANCILLARY PROCEDURE (OUTPATIENT)
Dept: MAMMOGRAPHY | Facility: CLINIC | Age: 79
End: 2024-10-31
Attending: NURSE PRACTITIONER
Payer: COMMERCIAL

## 2024-10-31 DIAGNOSIS — C50.919 MALIGNANT NEOPLASM OF FEMALE BREAST, UNSPECIFIED ESTROGEN RECEPTOR STATUS, UNSPECIFIED LATERALITY, UNSPECIFIED SITE OF BREAST (H): ICD-10-CM

## 2024-10-31 DIAGNOSIS — Z12.31 ENCOUNTER FOR SCREENING MAMMOGRAM FOR MALIGNANT NEOPLASM OF BREAST: ICD-10-CM

## 2024-10-31 PROCEDURE — 77063 BREAST TOMOSYNTHESIS BI: CPT

## 2024-11-01 NOTE — PROGRESS NOTES
"Medication Therapy Management (MTM) Encounter    ASSESSMENT:                            Medication Adherence/Access: No issues identified.    Overactive bladder:   She may benefit from a trial of beta-3 agonist to relax bladder muscles. An anticholinergic will interact with her donepezil.    Alzheimer's Disease:  Stable.    PLAN:                            1. Start Myrbetriq 25mg one table by mouth daily to help with overactive bladder.    Follow-up: Return in about 1 month (around 12/5/2024) for Medication dose check.    SUBJECTIVE/OBJECTIVE:                          Norma Paul is a 79 year old female seen for an initial visit. She was referred to me from Jodie Vu. Patient was accompanied by her .     Reason for visit: overactive bladder treatment options    Allergies/ADRs: Reviewed in chart  Past Medical History: Reviewed in chart  Tobacco: She reports that she has never smoked. She has never used smokeless tobacco.  Alcohol: Not discussed    Recent Falls: None reported, although I did observe a slight bruise on her upper arm when checking blood pressure level today. She also had a cut that was healing across the R side of her clavicle. I did not question the patient about these wounds today.    Medication Adherence/Access: no issues reported.    Overactive bladder:   not currently on any medication.  Frequent urination - 3-4x even within the last hour. Frequently feels like she has to urinate, but then can't. Has had c/o of dysuria and overactive bladder or other urinary symptoms multiple times over the last year or longer.  reports symptoms are worse \"when she is nervous\". She denies that symptoms occur at night or that her sleep is disrupted.  She is currently taking donepezil for Alzheimer's disease, so an anticholinergic would work opposite the effect of her acetylcholinesterase inhibitor. The only reasonable option to try would be a beta-3 agonist. Norma and her  were agreeble for " "a prescription for Myrbetriq to be sent to pharmacy, but were concerned about the $45 copay.    Alzheimer's Disease:  Donepezil 10mg at bedtime  Memantine 10mg twice daily  No issues reported, denies side effects    Today's Vitals: /74   Pulse 84   Ht 5' 4.5\" (1.638 m)   Wt 112 lb (50.8 kg)   BMI 18.93 kg/m    ----------------      I spent 30 minutes with this patient today. All changes were made via collaborative practice agreement with Jodie Vu MD. A copy of the visit note was provided to the patient's provider(s).    A summary of these recommendations was declined by the patient.    Celina Brenner, Pharm.D., [facility]  Medication Therapy Management Pharmacist  [phone]          Medication Therapy Recommendations  Overactive bladder   1 Current Medication: mirabegron (MYRBETRIQ) 25 MG 24 hr tablet   Current Medication Sig: Take 25 mg by mouth daily.   Rationale: Untreated condition - Needs additional medication therapy - Indication   Recommendation: Start Medication   Status: Accepted per CPA   Identified Date: 11/5/2024 Completed Date: 11/5/2024            "

## 2024-11-05 ENCOUNTER — OFFICE VISIT (OUTPATIENT)
Dept: PHARMACY | Facility: PHYSICIAN GROUP | Age: 79
End: 2024-11-05
Payer: COMMERCIAL

## 2024-11-05 VITALS
SYSTOLIC BLOOD PRESSURE: 120 MMHG | BODY MASS INDEX: 18.66 KG/M2 | HEART RATE: 84 BPM | DIASTOLIC BLOOD PRESSURE: 74 MMHG | HEIGHT: 65 IN | WEIGHT: 112 LBS

## 2024-11-05 DIAGNOSIS — N32.81 OVERACTIVE BLADDER: Primary | ICD-10-CM

## 2024-11-05 DIAGNOSIS — G30.9 ALZHEIMER'S DISEASE (H): ICD-10-CM

## 2024-11-05 DIAGNOSIS — F02.80 ALZHEIMER'S DISEASE (H): ICD-10-CM

## 2024-11-05 PROCEDURE — 99605 MTMS BY PHARM NP 15 MIN: CPT | Performed by: PHARMACIST

## 2024-11-05 PROCEDURE — 99607 MTMS BY PHARM ADDL 15 MIN: CPT | Performed by: PHARMACIST

## 2024-11-05 RX ORDER — MIRABEGRON 25 MG/1
25 TABLET, FILM COATED, EXTENDED RELEASE ORAL DAILY
COMMUNITY

## 2024-11-05 NOTE — PATIENT INSTRUCTIONS
Recommendations from today's MTM visit:                                                      ASSESSMENT:                            Medication Adherence/Access: No issues identified.    Overactive bladder:   She may benefit from a trial of beta-3 agonist to relax bladder muscles. An anticholinergic will interact with her donepezil.    Alzheimer's Disease:  Stable.    PLAN:                            1. Start Myrbetriq 25mg one table by mouth daily to help with overactive bladder.    Follow-up: Return in about 1 month (around 12/5/2024) for Medication dose check.    It was great to speak with you today.  I value your experience and would be very thankful for your time with providing feedback on our clinic survey. You may receive a survey via email or text message in the next few days.     To schedule another MTM appointment, please call the clinic directly or you may call the scheduling line at [phone].    My Clinical Pharmacist's contact information:                                                      Please feel free to contact me with any questions or concerns you have.      Celina Brenner, Pharm.D., BCACP  Medication Therapy Management Pharmacist  [phone]

## 2024-11-05 NOTE — LETTER
November 5, 2024  Norma Paul  [address] [address]    Dear Ms. Paul, [facility]     Thank you for talking with me on Nov 5, 2024 about your health and medications. As a follow-up to our conversation, I have included two documents:      Your Recommended To-Do List has steps you should take to get the best results from your medications.  Your Medication List will help you keep track of your medications and how to take them.    If you want to talk about these documents, please call Celina Brenner RPH, PharmD, BCACP  at phone: [phone], Monday-Friday 8-4:30pm.    I look forward to working with you and your doctors to make sure your medications work well for you.    Sincerely,  Celina Brenner RPH, PharmD, BCACP   MTM Pharmacist, [facility]

## 2024-11-05 NOTE — LETTER
"Recommended To-Do List      Prepared on: Nov 5, 2024       You can get the best results from your medications by completing the items on this \"To-Do List.\"      Bring your To-Do List when you go to your doctor. And, share it with your family or caregivers.    My To-Do List:  What we talked about: What I should do:   A new medication that may be of benefit to you    Start taking Myrbetriq 25mg one table by mouth daily to help with overactive bladder.          What we talked about: What I should do:                     "

## 2024-11-05 NOTE — LETTER
_  Medication List        Prepared on: Nov 5, 2024     Bring your Medication List when you go to the doctor, hospital, or   emergency room. And, share it with your family or caregivers.     Note any changes to how you take your medications.  Cross out medications when you no longer use them.    Medication How I take it Why I use it Prescriber   acetaminophen (TYLENOL) 500 MG tablet Take 500-1,000 mg by mouth every 6 hours as needed  Pain Historical Provider   cyanocobalamin (VITAMIN B-12) 1000 MCG tablet Take 1 tablet (1,000 mcg) by mouth daily Low serum vitamin B12 Nuno Sams MD   donepezil (ARICEPT) 10 MG tablet Take 1 tablet (10 mg) by mouth at bedtime Major neurocognitive disorder due to Alzheimer's disease (H) Nuno Sams MD   latanoprost (XALATAN) 0.005 % ophthalmic solution Place 1 drop into both eyes daily  Glaucoma Patient Reported   memantine (NAMENDA) 10 MG tablet Take 1 tablet (10 mg) by mouth 2 times daily. Major neurocognitive disorder due to Alzheimer's disease (H) Berna Richardson MD   mirabegron (MYRBETRIQ) 25 MG 24 hr tablet Take 25 mg by mouth daily. Overactive Bladder Jodie Vu MD   multivitamin (ONE A DAY) per tablet [MULTIVITAMIN (ONE A DAY) PER TABLET] Take 1 tablet by mouth daily.  General Health   Historical Provider   Vitamin D (Cholecalciferol) 25 MCG (1000 UT) TABS Take 1 tablet by mouth daily  General Health  Patient Reported         Add new medications, over-the-counter drugs, herbals, vitamins, or  minerals in the blank rows below.    Medication How I take it Why I use it Prescriber                                      Allergies:      - Neomycin - Anaphylaxis  - Penicillins - Anaphylaxis  - Statins-hmg-coa Reductase Inhibitors [statins] - Muscle Pain (Myalgia)  - Atorvastatin - Muscle Pain (Myalgia)  - Zetia [ezetimibe] - Muscle Pain (Myalgia)  - Bacitracin - Rash  - Erythromycin - Rash  - Sulfa (sulfonamide Antibiotics) [sulfa Antibiotics] - Rash        Side  effects I have had:      Not on File        Other Information:              My notes and questions:

## 2024-11-25 DIAGNOSIS — F02.80 MAJOR NEUROCOGNITIVE DISORDER DUE TO ALZHEIMER'S DISEASE (H): ICD-10-CM

## 2024-11-25 DIAGNOSIS — G30.9 MAJOR NEUROCOGNITIVE DISORDER DUE TO ALZHEIMER'S DISEASE (H): ICD-10-CM

## 2024-11-26 NOTE — TELEPHONE ENCOUNTER
Refill request for: donepezil (ARICEPT) 10 MG tablet    Directions: Take 1 tablet (10 mg) by mouth at bedtime     LOV: 4/3/24  NOV: Not scheduled- Due April 2024    90 day supply with 0 refills Medication T'd for review and signature  Judi DEAN CMA on 11/26/2024 at 1:37 PM  [facility]

## 2024-11-27 RX ORDER — DONEPEZIL HYDROCHLORIDE 10 MG/1
10 TABLET, FILM COATED ORAL AT BEDTIME
Qty: 90 TABLET | Refills: 0 | Status: SHIPPED | OUTPATIENT
Start: 2024-11-27

## 2025-02-04 DIAGNOSIS — G30.9 MAJOR NEUROCOGNITIVE DISORDER DUE TO ALZHEIMER'S DISEASE (H): ICD-10-CM

## 2025-02-04 DIAGNOSIS — F02.80 MAJOR NEUROCOGNITIVE DISORDER DUE TO ALZHEIMER'S DISEASE (H): ICD-10-CM

## 2025-02-04 RX ORDER — DONEPEZIL HYDROCHLORIDE 10 MG/1
10 TABLET, FILM COATED ORAL AT BEDTIME
Qty: 90 TABLET | Refills: 1 | Status: SHIPPED | OUTPATIENT
Start: 2025-02-04

## 2025-02-04 NOTE — TELEPHONE ENCOUNTER
Refill request for: donepezil (ARICEPT) 10 MG tablet             Directions: Take 1 tablet (10 mg) by mouth at bedtime      LOV: 4/3/24  NOV: 6/12/25     90 day supply with 1 refills Medication T'd for review and signature  Judi DEAN CMA on 2/4/2025 at 10:41 AM  [facility]

## 2025-03-14 ENCOUNTER — LAB REQUISITION (OUTPATIENT)
Dept: LAB | Facility: CLINIC | Age: 80
End: 2025-03-14
Payer: COMMERCIAL

## 2025-03-14 DIAGNOSIS — R39.9 UNSPECIFIED SYMPTOMS AND SIGNS INVOLVING THE GENITOURINARY SYSTEM: ICD-10-CM

## 2025-03-14 PROCEDURE — 87186 SC STD MICRODIL/AGAR DIL: CPT | Mod: ORL | Performed by: FAMILY MEDICINE

## 2025-03-15 LAB — BACTERIA UR CULT: ABNORMAL

## 2025-04-03 ENCOUNTER — TELEPHONE (OUTPATIENT)
Dept: NEUROLOGY | Facility: CLINIC | Age: 80
End: 2025-04-03
Payer: COMMERCIAL

## 2025-04-03 NOTE — TELEPHONE ENCOUNTER
"RN returned call to Jony. (ctc on file)    Jony had many questions about \"stages of dementia\" and wanted to know what kinds of tests were available to evaluate a person's dementia. Jony asked several times during conversation \"doesn't the neurologist have any say in when a person needs advanced care?\" Jony expressed frustration during conversation and told RN \"I'm not sure what's the point of having these appointments. We show up, the doctor asks a few questions, shakes my hand and says 'see you next year. Doesn't the doctor want to do additional testing to see how the demential is progressing?\"    RN informed Jony that there is no cure for dementia, and a lot of focus is placed on symptom management and making sure that the patient is safe and not a harm to themselves or others. RN explained that, as a person's dementia worsens, the neurology clinic can make recommendations about exploring memory care options or restricting a person's driving privileges. However, it is a collaborative effort between the doctor, patient, family, and medical staff - the neurologist isn't the only person making the recommendations and final decisions.    Jony continued to ask RN similar questions to those mentioned above and became frustrated with RN over the phone - \"you're not understanding my questions.\" RN attempted to answer Jony's questions, but Jony kept repeating the same questions. Eventually, Jony told RN \"I've wasted enough of your time, thank you for the return call.\"    Ni Cano RN, BSN  [facility]    "

## 2025-04-03 NOTE — TELEPHONE ENCOUNTER
[facility] Call Center    Phone Message    May a detailed message be left on voicemail: yes     Reason for Call: Other:       Patient's , Jony (ctc on file), requesting call back from care team to discuss progression of patient's dementia.   Jony has questions about how to know when or at what rate the disease is progressing and how that relates to the patient needing or qualifying for extra care or assistance or even being placed into a memory care facility.   Jony requesting call back to discuss at phone #[phone]     Action Taken: Message routed to:  Other: [facility]    Travel Screening: Not Applicable

## 2025-04-15 ENCOUNTER — LAB REQUISITION (OUTPATIENT)
Dept: LAB | Facility: CLINIC | Age: 80
End: 2025-04-15
Payer: COMMERCIAL

## 2025-04-15 DIAGNOSIS — R39.9 UNSPECIFIED SYMPTOMS AND SIGNS INVOLVING THE GENITOURINARY SYSTEM: ICD-10-CM

## 2025-04-16 LAB — BACTERIA UR CULT: ABNORMAL

## 2025-05-06 ENCOUNTER — LAB REQUISITION (OUTPATIENT)
Dept: LAB | Facility: CLINIC | Age: 80
End: 2025-05-06
Payer: COMMERCIAL

## 2025-05-06 DIAGNOSIS — R39.9 UNSPECIFIED SYMPTOMS AND SIGNS INVOLVING THE GENITOURINARY SYSTEM: ICD-10-CM

## 2025-05-07 LAB — BACTERIA UR CULT: ABNORMAL

## 2025-05-20 ENCOUNTER — LAB REQUISITION (OUTPATIENT)
Dept: LAB | Facility: CLINIC | Age: 80
End: 2025-05-20
Payer: COMMERCIAL

## 2025-05-20 DIAGNOSIS — R39.9 UNSPECIFIED SYMPTOMS AND SIGNS INVOLVING THE GENITOURINARY SYSTEM: ICD-10-CM

## 2025-05-20 LAB
ALBUMIN UR-MCNC: NEGATIVE MG/DL
APPEARANCE UR: ABNORMAL
BACTERIA #/AREA URNS HPF: ABNORMAL /HPF
BILIRUB UR QL STRIP: NEGATIVE
COLOR UR AUTO: YELLOW
GLUCOSE UR STRIP-MCNC: NEGATIVE MG/DL
HGB UR QL STRIP: NEGATIVE
KETONES UR STRIP-MCNC: NEGATIVE MG/DL
LEUKOCYTE ESTERASE UR QL STRIP: ABNORMAL
NITRATE UR QL: POSITIVE
PH UR STRIP: 5 [PH] (ref 5–7)
RBC URINE: 1 /HPF
SP GR UR STRIP: 1.02 (ref 1–1.03)
SQUAMOUS EPITHELIAL: 1 /HPF
UROBILINOGEN UR STRIP-MCNC: NORMAL MG/DL
WBC URINE: 62 /HPF

## 2025-05-21 LAB
ANION GAP SERPL CALCULATED.3IONS-SCNC: 9 MMOL/L (ref 7–15)
BUN SERPL-MCNC: 32.8 MG/DL (ref 8–23)
CALCIUM SERPL-MCNC: 9.2 MG/DL (ref 8.8–10.4)
CHLORIDE SERPL-SCNC: 103 MMOL/L (ref 98–107)
CREAT SERPL-MCNC: 0.73 MG/DL (ref 0.51–0.95)
EGFRCR SERPLBLD CKD-EPI 2021: 83 ML/MIN/1.73M2
GLUCOSE SERPL-MCNC: 92 MG/DL (ref 70–99)
HCO3 SERPL-SCNC: 29 MMOL/L (ref 22–29)
POTASSIUM SERPL-SCNC: 4.6 MMOL/L (ref 3.4–5.3)
SODIUM SERPL-SCNC: 141 MMOL/L (ref 135–145)
TSH SERPL DL<=0.005 MIU/L-ACNC: 0.9 UIU/ML (ref 0.3–4.2)

## 2025-05-22 LAB — BACTERIA UR CULT: ABNORMAL

## 2025-06-02 ENCOUNTER — LAB REQUISITION (OUTPATIENT)
Dept: LAB | Facility: CLINIC | Age: 80
End: 2025-06-02
Payer: COMMERCIAL

## 2025-06-02 DIAGNOSIS — R39.9 UNSPECIFIED SYMPTOMS AND SIGNS INVOLVING THE GENITOURINARY SYSTEM: ICD-10-CM

## 2025-06-02 PROCEDURE — 87186 SC STD MICRODIL/AGAR DIL: CPT | Mod: ORL | Performed by: FAMILY MEDICINE

## 2025-06-03 LAB — BACTERIA UR CULT: ABNORMAL

## 2025-06-06 DIAGNOSIS — G30.9 MAJOR NEUROCOGNITIVE DISORDER DUE TO ALZHEIMER'S DISEASE (H): ICD-10-CM

## 2025-06-06 DIAGNOSIS — F02.80 MAJOR NEUROCOGNITIVE DISORDER DUE TO ALZHEIMER'S DISEASE (H): ICD-10-CM

## 2025-06-09 ENCOUNTER — TELEPHONE (OUTPATIENT)
Dept: NEUROLOGY | Facility: CLINIC | Age: 80
End: 2025-06-09
Payer: COMMERCIAL

## 2025-06-09 RX ORDER — MEMANTINE HYDROCHLORIDE 10 MG/1
10 TABLET ORAL 2 TIMES DAILY
Qty: 180 TABLET | Refills: 0 | Status: SHIPPED | OUTPATIENT
Start: 2025-06-09 | End: 2025-06-12

## 2025-06-09 RX ORDER — DONEPEZIL HYDROCHLORIDE 10 MG/1
10 TABLET, FILM COATED ORAL AT BEDTIME
Qty: 90 TABLET | Refills: 1 | OUTPATIENT
Start: 2025-06-09

## 2025-06-09 NOTE — TELEPHONE ENCOUNTER
Signed Prescriptions:                        Disp   Refills    memantine (NAMENDA) 10 MG tablet           180 ta*0        Sig: TAKE 1 TABLET BY MOUTH TWICE A DAY  Authorizing Provider: SRAVANI WEBSTER  Ordering User: POONAM REED    Refill approved for 90 day supply, pt has upcoming appt 6/12.     Poonam SPARROW RN, BSN  [facility]

## 2025-06-09 NOTE — TELEPHONE ENCOUNTER
Refused Prescriptions:                       Disp   Refills    donepezil (ARICEPT) 10 MG tablet [Pharmacy*90 tab*1        Sig: TAKE 1 TABLET BY MOUTH EVERYDAY AT BEDTIME  Refused By: POONAM REED  Reason for Refusal: Should already have refills on file    90 day supply with 1 refill provided 2/4/25, too soon for refill. Pt has upcoming appt 6/12.     Poonam SPARROW RN, BSN  [facility]

## 2025-06-09 NOTE — TELEPHONE ENCOUNTER
Received secure chat message around 330pm informing that patients spouse Jony had urgent question. No message was sent to care team at that time.     Returned call to JANNIE Borges to return call to clinic at earliest convenience. Encouraged him to utilize triage nursing team if there is an urgent need.     Jorje SPARROW, RN, BSN  [facility]

## 2025-06-10 NOTE — TELEPHONE ENCOUNTER
[facility] Call Center     Phone Message     May a detailed message be left on voicemail: yes      Reason for Call: Other: Patient requests a call back ASAP to discuss why medications are being denied and also getting multiply prescriptions at one time.   said he has tried and tried to get answer and no one has called back       Patient would like to set up both medication to be picked at the same  with a 90 day supply time  Patient said this is his fourth time calling     Patient is very upset and did not want me to detail anything in this message        Action Taken:[facility]     Travel Screening: Not Applicable          Date of Service:

## 2025-06-12 ENCOUNTER — OFFICE VISIT (OUTPATIENT)
Dept: NEUROLOGY | Facility: CLINIC | Age: 80
End: 2025-06-12
Payer: COMMERCIAL

## 2025-06-12 VITALS
HEART RATE: 78 BPM | BODY MASS INDEX: 18.33 KG/M2 | SYSTOLIC BLOOD PRESSURE: 104 MMHG | WEIGHT: 110 LBS | HEIGHT: 65 IN | DIASTOLIC BLOOD PRESSURE: 73 MMHG

## 2025-06-12 DIAGNOSIS — F02.80 MAJOR NEUROCOGNITIVE DISORDER DUE TO ALZHEIMER'S DISEASE (H): Primary | ICD-10-CM

## 2025-06-12 DIAGNOSIS — G30.9 MAJOR NEUROCOGNITIVE DISORDER DUE TO ALZHEIMER'S DISEASE (H): Primary | ICD-10-CM

## 2025-06-12 DIAGNOSIS — R63.0 POOR APPETITE: ICD-10-CM

## 2025-06-12 DIAGNOSIS — R63.4 WEIGHT LOSS: ICD-10-CM

## 2025-06-12 RX ORDER — DONEPEZIL HYDROCHLORIDE 10 MG/1
10 TABLET, FILM COATED ORAL AT BEDTIME
Qty: 90 TABLET | Refills: 1 | Status: SHIPPED | OUTPATIENT
Start: 2025-06-12

## 2025-06-12 RX ORDER — MEMANTINE HYDROCHLORIDE 10 MG/1
10 TABLET ORAL 2 TIMES DAILY
Qty: 180 TABLET | Refills: 1 | Status: SHIPPED | OUTPATIENT
Start: 2025-06-12

## 2025-06-12 NOTE — LETTER
6/12/2025      Norma Paul  [address]  [place] [address]      Dear Colleague,    Thank you for referring your patient, Norma Paul, to the [facility] [place]. Please see a copy of my visit note below.    NEUROLOGY OUTPATIENT PROGRESS NOTE   Jun 12, 2025     CHIEF COMPLAINT/REASON FOR VISIT/REASON FOR CONSULT  Patient presents with:  Dementia: Follow up on memory. Pt states memory is stable. No other concerns.    REASON FOR CONSULTATION-memory problems    REFERRAL SOURCE  Dr. Jodie Vu  CC Dr. Jodie Vu    HISTORY OF PRESENT ILLNESS  Norma Paul is a 80 year old female seen today for evaluation of cognitive patient is accompanied by .  1.  Patient symptoms started about last September.  They have progressively been getting worse.  She has more short-term issues, long-term memory issues.  Has difficulty finding words when she is trying to express herself.  Is currently not driving because of cognitive difficulties.  Has difficulty staying asleep and falling asleep.  This may be contributing to the cognitive issues.  She has bladder issues and has to get up in the middle of the night to go to the bathroom and then has difficulty falling asleep.    2.  There is no family history of dementia however there are several maternal aunts who have showing memory loss and confusion.  The patient's mother did possibly have memory problems in the later years of her life    3.  Per the family things that they have noticed:-  -Word retrieval and name recall difficulty  - Forgotten family names unable to recall past events  - Difficulty explaining and recalling where she is  - Difficulty recalling dates and people's names  - Asking the same questions over and over again  - Being anxious and knowing where certain things are kept  - Difficulty recalling how she got to a particular place  - Struggling with vocabulary and finding the right words and naming familiar objects  -  Difficulty locating where she put things  - Difficulty sequencing events    12/19/22  Patient returns today  1.  She did try the B12 supplement without benefit.  B12 was rechecked and was really elevated at 4000 and as a result the primary care doctor stopped this medication.  B12 was supposed to be rechecked early next year  2.  She did try the gabapentin at side effects.  Overall she is getting good sleep though does have issues in certain days  3.  Does have good days and bad days with her memory.  Sometimes is asking where she is and not realizing that she is at home.  The insomnia does not correlate with the good days and bad days.  4.  Neuropsychology set up in June.  Family is upset about the neuropsychology being so far away.    4/3/23  She returns today  1.  Has been doing the B12 supplement.  No significant benefit.  Currently is doing it every other day.  2.  Is no longer using gabapentin due to side effects.  3.  Is sleeping well most nights though occasionally will get up and have difficulty falling asleep  4.  Aricept is made significant improvement in her cognitive functioning.  She is no longer seeing things that do not make sense.  She is able to function more independently.  Does feel a bit tired with the Aricept and discussed about trying a low-dose of the Aricept.  5.  Did complete neuropsych evaluation.    4/3/24  Patient returns today  1.  Memory has been about the same.  Continues to have episodes of confusion where she will forget how to do things or keep on asking the same questions again and again.  2.  Aricept has been helping.  No major side effects  3.  Sleeping better at night.  Does have some tiredness which increases the episodes of confusion.  Discussed about taking naps during daytime  4.  Has lost her appetite and has been losing weight.  Discussed about seeing a nutritionist.  Family wants to think about that.  No other new concerns.    6/12/25  Patient returns today  1.  Memory  problems are about the same.  Does have some episodes of confusion more in the evening.  Does take a nap.  Does have some ongoing fatigue and we discussed exercise and taking naps.  No major mistakes that she has made.  Is still able to do things at home.  Discussed about memory care.  2.  Remains on Aricept and Namenda.  Some side effects of diarrhea in the morning unclear if this is related to the Aricept or not.  No dizziness.  3.  Continues to have poor appetite with weight loss.  We discussed worsening nutritional consult versus working with primary care to see why her nutrition might not be getting absorbed even though she is eating.  Discussed that this is not related to dementia  4.  Discussed severity of dementia.  Discussed prognosis.  Discussed previous MoCA score of 8.  No other new concerns.    Previous history is reviewed and this is unchanged.    PAST MEDICAL/SURGICAL HISTORY  Past Medical History:   Diagnosis Date     Breast cancer (H) 2016    right ca     Cystocele with prolapse      Hx of radiation therapy      Hyperlipidemia      Indwelling catheter present on admission      Prolapse, uterovaginal      Patient Active Problem List   Diagnosis     Malignant neoplasm of overlapping sites of left breast in female, estrogen receptor positive (H)     Urinary retention     Hyponatremia     JOE (acute kidney injury)     Leukocytosis     Urinary tract infection     Uterine prolapse     Hydronephrosis     Sepsis (H)     Dehydration     Renal stone     Pelvic abscess in female     Diverticulitis of large intestine with perforation and abscess without bleeding     Hypomagnesemia     Hypokalemia     Slow transit constipation     Diverticulitis     Aromatase inhibitor use     Vaginal vault prolapse   Significant for high cholesterol, breast cancer in remission with use of radiation and chemotherapy    FAMILY HISTORY  Family History   Problem Relation Age of Onset     Breast Cancer Cousin 73.00     Cancer Cousin  "        Bladder     Heart Disease Mother      Hypertension Mother      Heart Disease Father      Hypertension Father      Atrial fibrillation Sister      Thyroid Disease Sister      Skin Cancer Sister 73.00     No Known Problems Daughter      No Known Problems Son      Lung Cancer Maternal Uncle 67.00   Positive for memory loss in her mother and multiple maternal aunts    SOCIAL HISTORY  Social History     Tobacco Use     Smoking status: Never     Smokeless tobacco: Never   Vaping Use     Vaping status: Never Used   Substance Use Topics     Alcohol use: Not Currently     Drug use: No       SYSTEMS REVIEW  Twelve-system ROS was done and other than the HPI this was negative except for bladder symptoms and frequent urination, sleepiness during the daytime, confusion, anxiety, constipation  No new symptoms.    MEDICATIONS  Current Outpatient Medications   Medication Sig Dispense Refill     acetaminophen (TYLENOL) 500 MG tablet Take 500-1,000 mg by mouth every 6 hours as needed       cyanocobalamin (VITAMIN B-12) 1000 MCG tablet Take 1 tablet (1,000 mcg) by mouth daily 90 tablet 1     donepezil (ARICEPT) 10 MG tablet Take 1 tablet (10 mg) by mouth at bedtime. 90 tablet 1     latanoprost (XALATAN) 0.005 % ophthalmic solution Place 1 drop into both eyes daily       memantine (NAMENDA) 10 MG tablet Take 1 tablet (10 mg) by mouth 2 times daily. 180 tablet 1     multivitamin (ONE A DAY) per tablet [MULTIVITAMIN (ONE A DAY) PER TABLET] Take 1 tablet by mouth daily.       Vitamin D (Cholecalciferol) 25 MCG (1000 UT) TABS Take 1 tablet by mouth daily       mirabegron (MYRBETRIQ) 25 MG 24 hr tablet Take 25 mg by mouth daily. (Patient not taking: Reported on 6/12/2025)       No current facility-administered medications for this visit.        PHYSICAL EXAMINATION  VITALS: /73 (BP Location: Left arm, Patient Position: Sitting)   Pulse 78   Ht 1.638 m (5' 4.5\")   Wt 49.9 kg (110 lb)   BMI 18.59 kg/m    GENERAL: Healthy " appearing, alert, no acute distress, normal habitus.  CARDIOVASCULAR: Extremities warm and well perfused. Pulses present.   NEUROLOGICAL:  Patient is awake and partially oriented to self, place and time.  Attention span is normal.  Memory is limited; previously MoCA 8.  Language is fluent and follows commands appropriately.  Appropriate fund of knowledge. Cranial nerves 2-12 are intact. There is no pronator drift.  Motor exam shows 5/5 strength in all extremities.  Tone is symmetric bilaterally in upper and lower extremities.  (Previously reflexes are symmetric and 1+ in upper extremities and lower extremities. Sensory exam is grossly intact to light touch, pin prick and vibration.)  Finger to nose and heel to shin is without dysmetria.  Romberg is negative.  Gait is normal and the patient is able to do tandem walk and walk on toes and heels with mild difficulty.  Exam stable.    DIAGNOSTICS  RELEVANT LABS  Component      Latest Ref Rng & Units 7/20/2022   Sodium      136 - 145 mmol/L 143   Potassium      3.4 - 5.3 mmol/L 4.3   Creatinine      0.51 - 0.95 mg/dL 0.73   Urea Nitrogen      8.0 - 23.0 mg/dL 11.0   Chloride      98 - 107 mmol/L 105   Carbon Dioxide (CO2)      22 - 29 mmol/L 27   Anion Gap      7 - 15 mmol/L 11   Glucose      70 - 99 mg/dL 97   Calcium      8.8 - 10.2 mg/dL 9.7   Protein Total      6.4 - 8.3 g/dL 6.2 (L)   Albumin      3.5 - 5.2 g/dL 4.1   Bilirubin Total      <=1.2 mg/dL 0.6   Alkaline Phosphatase      35 - 104 U/L 76   AST      10 - 35 U/L 27   ALT      10 - 35 U/L 22   GFR Estimate      >60 mL/min/1.73m2 84   TSH      0.30 - 4.20 uIU/mL 0.83   Vitamin B12      232 - 1,245 pg/mL 324       OUTSIDE RECORDS  Outside referral notes and chart notes were reviewed and pertinent information has been summarized (in addition to the HPI):-  Cancer notes              MRI brain images reviewed.  Age-related changes noted.  IMPRESSION:  1.  No acute/subacute infarction, intracranial hemorrhage, mass  effect, or hydrocephalus.  2.  Mild global brain parenchymal volume loss.    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  Poor appetite    This is a 80 year old female with cognitive problems since September 2021.  She does score really low on the [place] previously.  MRI brain is negative for structural lesions.  Blood work has shown low vitamin B12 and she has done supplementation without any benefit.  Neuropsychology has suggested major neurocognitive disorder due to Alzheimer's disease.  She started on Aricept which has been helping.  No side effects.  Will continue.  Will add Namenda to see if she gets further benefit.  Continue medications.    She does have some episodes of confusion related to the dementia when she is more tired.  Encouraged daytime naps.  There is no agitation will hold off on other medications.      She does have some fatigue which is unrelated to the dementia and would recommend working with primary care.    Discussed prognosis of dementia.  She is lost a lot of weight due to poor appetite and would recommend seeing a nutritionist though insurance will not cover this and family wants to think about it.  Reviewed again with the family.    Patient's  had numerous questions which I tried to answer.  Discussed lack of cure for dementia.  Discussed MoCA score and severity of her dementia.  Would recommend exercise, healthy lifestyle and healthy diet.  Also would recommend keeping the brain active.    Return back in 1 year.  Medications refilled.      -     donepezil (ARICEPT) 10 MG tablet; Take 1 tablet (10 mg) by mouth at bedtime.  -     memantine (NAMENDA) 10 MG tablet; Take 1 tablet (10 mg) by mouth 2 times daily.    Return in about 1 year (around 6/12/2026) for In-Clinic Visit (must).    Over 40 minutes were spent coordinating the care for the  patient on the day of the encounter.  This includes previsit, during visit and post visit activities as documented above.  Counseling patient family.  Prescription management.  Multiple questions from the .  (Activities include but not inclusive of reviewing chart, reviewing outside records, reviewing labs and imaging study results as well as the images, patient visit time including getting history and exam,  use if applicable, review of test results with the patient and coming up with a plan in a shared model, counseling patient and family, education and answering patient questions, EMR , EMR diagnosis entry and problem list management, medication reconciliation and prescription management if applicable, paperwork if applicable, printing documents and documentation of the visit activities.)        Nuno Sams MD  Neurologist  [facility]  Tel:- [phone]    This note was dictated using voice recognition software.  Any grammatical or context distortions are unintentional and inherent to the software.    The longitudinal plan of care for the diagnosis(es)/condition(s) as documented were addressed during this visit. Due to the added complexity in care, I will continue to support Norma in the subsequent management and with ongoing continuity of care.      Again, thank you for allowing me to participate in the care of your patient.        Sincerely,        Nuno Sams MD    Electronically signed

## 2025-06-12 NOTE — NURSING NOTE
Chief Complaint   Patient presents with    Dementia     Follow up on memory. Pt states memory is stable. No other concerns.     Maria Luisa Ibrahim, CMA on 6/12/2025 at 11:27 AM

## 2025-06-12 NOTE — PROGRESS NOTES
NEUROLOGY OUTPATIENT PROGRESS NOTE   Jun 12, 2025     CHIEF COMPLAINT/REASON FOR VISIT/REASON FOR CONSULT  Patient presents with:  Dementia: Follow up on memory. Pt states memory is stable. No other concerns.    REASON FOR CONSULTATION-memory problems    REFERRAL SOURCE  Dr. Jodie Vu  CC Dr. Jodie Vu    HISTORY OF PRESENT ILLNESS  Norma Paul is a 80 year old female seen today for evaluation of cognitive patient is accompanied by .  1.  Patient symptoms started about last September.  They have progressively been getting worse.  She has more short-term issues, long-term memory issues.  Has difficulty finding words when she is trying to express herself.  Is currently not driving because of cognitive difficulties.  Has difficulty staying asleep and falling asleep.  This may be contributing to the cognitive issues.  She has bladder issues and has to get up in the middle of the night to go to the bathroom and then has difficulty falling asleep.    2.  There is no family history of dementia however there are several maternal aunts who have showing memory loss and confusion.  The patient's mother did possibly have memory problems in the later years of her life    3.  Per the family things that they have noticed:-  -Word retrieval and name recall difficulty  - Forgotten family names unable to recall past events  - Difficulty explaining and recalling where she is  - Difficulty recalling dates and people's names  - Asking the same questions over and over again  - Being anxious and knowing where certain things are kept  - Difficulty recalling how she got to a particular place  - Struggling with vocabulary and finding the right words and naming familiar objects  - Difficulty locating where she put things  - Difficulty sequencing events    12/19/22  Patient returns today  1.  She did try the B12 supplement without benefit.  B12 was rechecked and was really elevated at 4000 and as a result the primary care  doctor stopped this medication.  B12 was supposed to be rechecked early next year  2.  She did try the gabapentin at side effects.  Overall she is getting good sleep though does have issues in certain days  3.  Does have good days and bad days with her memory.  Sometimes is asking where she is and not realizing that she is at home.  The insomnia does not correlate with the good days and bad days.  4.  Neuropsychology set up in June.  Family is upset about the neuropsychology being so far away.    4/3/23  She returns today  1.  Has been doing the B12 supplement.  No significant benefit.  Currently is doing it every other day.  2.  Is no longer using gabapentin due to side effects.  3.  Is sleeping well most nights though occasionally will get up and have difficulty falling asleep  4.  Aricept is made significant improvement in her cognitive functioning.  She is no longer seeing things that do not make sense.  She is able to function more independently.  Does feel a bit tired with the Aricept and discussed about trying a low-dose of the Aricept.  5.  Did complete neuropsych evaluation.    4/3/24  Patient returns today  1.  Memory has been about the same.  Continues to have episodes of confusion where she will forget how to do things or keep on asking the same questions again and again.  2.  Aricept has been helping.  No major side effects  3.  Sleeping better at night.  Does have some tiredness which increases the episodes of confusion.  Discussed about taking naps during daytime  4.  Has lost her appetite and has been losing weight.  Discussed about seeing a nutritionist.  Family wants to think about that.  No other new concerns.    6/12/25  Patient returns today  1.  Memory problems are about the same.  Does have some episodes of confusion more in the evening.  Does take a nap.  Does have some ongoing fatigue and we discussed exercise and taking naps.  No major mistakes that she has made.  Is still able to do things  at home.  Discussed about memory care.  2.  Remains on Aricept and Namenda.  Some side effects of diarrhea in the morning unclear if this is related to the Aricept or not.  No dizziness.  3.  Continues to have poor appetite with weight loss.  We discussed worsening nutritional consult versus working with primary care to see why her nutrition might not be getting absorbed even though she is eating.  Discussed that this is not related to dementia  4.  Discussed severity of dementia.  Discussed prognosis.  Discussed previous MoCA score of 8.  No other new concerns.    Previous history is reviewed and this is unchanged.    PAST MEDICAL/SURGICAL HISTORY  Past Medical History:   Diagnosis Date    Breast cancer (H) 2016    right ca    Cystocele with prolapse     Hx of radiation therapy     Hyperlipidemia     Indwelling catheter present on admission     Prolapse, uterovaginal      Patient Active Problem List   Diagnosis    Malignant neoplasm of overlapping sites of left breast in female, estrogen receptor positive (H)    Urinary retention    Hyponatremia    JOE (acute kidney injury)    Leukocytosis    Urinary tract infection    Uterine prolapse    Hydronephrosis    Sepsis (H)    Dehydration    Renal stone    Pelvic abscess in female    Diverticulitis of large intestine with perforation and abscess without bleeding    Hypomagnesemia    Hypokalemia    Slow transit constipation    Diverticulitis    Aromatase inhibitor use    Vaginal vault prolapse   Significant for high cholesterol, breast cancer in remission with use of radiation and chemotherapy    FAMILY HISTORY  Family History   Problem Relation Age of Onset    Breast Cancer Cousin 73.00    Cancer Cousin         Bladder    Heart Disease Mother     Hypertension Mother     Heart Disease Father     Hypertension Father     Atrial fibrillation Sister     Thyroid Disease Sister     Skin Cancer Sister 73.00    No Known Problems Daughter     No Known Problems Son     Lung Cancer  "Maternal Uncle 67.00   Positive for memory loss in her mother and multiple maternal aunts    SOCIAL HISTORY  Social History     Tobacco Use    Smoking status: Never    Smokeless tobacco: Never   Vaping Use    Vaping status: Never Used   Substance Use Topics    Alcohol use: Not Currently    Drug use: No       SYSTEMS REVIEW  Twelve-system ROS was done and other than the HPI this was negative except for bladder symptoms and frequent urination, sleepiness during the daytime, confusion, anxiety, constipation  No new symptoms.    MEDICATIONS  Current Outpatient Medications   Medication Sig Dispense Refill    acetaminophen (TYLENOL) 500 MG tablet Take 500-1,000 mg by mouth every 6 hours as needed      cyanocobalamin (VITAMIN B-12) 1000 MCG tablet Take 1 tablet (1,000 mcg) by mouth daily 90 tablet 1    donepezil (ARICEPT) 10 MG tablet Take 1 tablet (10 mg) by mouth at bedtime. 90 tablet 1    latanoprost (XALATAN) 0.005 % ophthalmic solution Place 1 drop into both eyes daily      memantine (NAMENDA) 10 MG tablet Take 1 tablet (10 mg) by mouth 2 times daily. 180 tablet 1    multivitamin (ONE A DAY) per tablet [MULTIVITAMIN (ONE A DAY) PER TABLET] Take 1 tablet by mouth daily.      Vitamin D (Cholecalciferol) 25 MCG (1000 UT) TABS Take 1 tablet by mouth daily      mirabegron (MYRBETRIQ) 25 MG 24 hr tablet Take 25 mg by mouth daily. (Patient not taking: Reported on 6/12/2025)       No current facility-administered medications for this visit.        PHYSICAL EXAMINATION  VITALS: /73 (BP Location: Left arm, Patient Position: Sitting)   Pulse 78   Ht 1.638 m (5' 4.5\")   Wt 49.9 kg (110 lb)   BMI 18.59 kg/m    GENERAL: Healthy appearing, alert, no acute distress, normal habitus.  CARDIOVASCULAR: Extremities warm and well perfused. Pulses present.   NEUROLOGICAL:  Patient is awake and partially oriented to self, place and time.  Attention span is normal.  Memory is limited; previously MoCA 8.  Language is fluent and " follows commands appropriately.  Appropriate fund of knowledge. Cranial nerves 2-12 are intact. There is no pronator drift.  Motor exam shows 5/5 strength in all extremities.  Tone is symmetric bilaterally in upper and lower extremities.  (Previously reflexes are symmetric and 1+ in upper extremities and lower extremities. Sensory exam is grossly intact to light touch, pin prick and vibration.)  Finger to nose and heel to shin is without dysmetria.  Romberg is negative.  Gait is normal and the patient is able to do tandem walk and walk on toes and heels with mild difficulty.  Exam stable.    DIAGNOSTICS  RELEVANT LABS  Component      Latest Ref Rng & Units 7/20/2022   Sodium      136 - 145 mmol/L 143   Potassium      3.4 - 5.3 mmol/L 4.3   Creatinine      0.51 - 0.95 mg/dL 0.73   Urea Nitrogen      8.0 - 23.0 mg/dL 11.0   Chloride      98 - 107 mmol/L 105   Carbon Dioxide (CO2)      22 - 29 mmol/L 27   Anion Gap      7 - 15 mmol/L 11   Glucose      70 - 99 mg/dL 97   Calcium      8.8 - 10.2 mg/dL 9.7   Protein Total      6.4 - 8.3 g/dL 6.2 (L)   Albumin      3.5 - 5.2 g/dL 4.1   Bilirubin Total      <=1.2 mg/dL 0.6   Alkaline Phosphatase      35 - 104 U/L 76   AST      10 - 35 U/L 27   ALT      10 - 35 U/L 22   GFR Estimate      >60 mL/min/1.73m2 84   TSH      0.30 - 4.20 uIU/mL 0.83   Vitamin B12      232 - 1,245 pg/mL 324       OUTSIDE RECORDS  Outside referral notes and chart notes were reviewed and pertinent information has been summarized (in addition to the HPI):-  Cancer notes              MRI brain images reviewed.  Age-related changes noted.  IMPRESSION:  1.  No acute/subacute infarction, intracranial hemorrhage, mass effect, or hydrocephalus.  2.  Mild global brain parenchymal volume loss.    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  Poor appetite    This is a 80  year old female with cognitive problems since September 2021.  She does score really low on the [place] previously.  MRI brain is negative for structural lesions.  Blood work has shown low vitamin B12 and she has done supplementation without any benefit.  Neuropsychology has suggested major neurocognitive disorder due to Alzheimer's disease.  She started on Aricept which has been helping.  No side effects.  Will continue.  Will add Namenda to see if she gets further benefit.  Continue medications.    She does have some episodes of confusion related to the dementia when she is more tired.  Encouraged daytime naps.  There is no agitation will hold off on other medications.      She does have some fatigue which is unrelated to the dementia and would recommend working with primary care.    Discussed prognosis of dementia.  She is lost a lot of weight due to poor appetite and would recommend seeing a nutritionist though insurance will not cover this and family wants to think about it.  Reviewed again with the family.    Patient's  had numerous questions which I tried to answer.  Discussed lack of cure for dementia.  Discussed MoCA score and severity of her dementia.  Would recommend exercise, healthy lifestyle and healthy diet.  Also would recommend keeping the brain active.    Return back in 1 year.  Medications refilled.      -     donepezil (ARICEPT) 10 MG tablet; Take 1 tablet (10 mg) by mouth at bedtime.  -     memantine (NAMENDA) 10 MG tablet; Take 1 tablet (10 mg) by mouth 2 times daily.    Addendum:-Second visit to the patient room.  Discussed long-term prognosis of dementia.    Return in about 1 year (around 6/12/2026) for In-Clinic Visit (must).    Over 40 minutes were spent coordinating the care for the patient on the day of the encounter.  This includes previsit, during visit and post visit activities as documented above.  Counseling patient family.  Prescription management.  Multiple questions from the  micah.  (Activities include but not inclusive of reviewing chart, reviewing outside records, reviewing labs and imaging study results as well as the images, patient visit time including getting history and exam,  use if applicable, review of test results with the patient and coming up with a plan in a shared model, counseling patient and family, education and answering patient questions, EMR , EMR diagnosis entry and problem list management, medication reconciliation and prescription management if applicable, paperwork if applicable, printing documents and documentation of the visit activities.)        Nuno Sams MD  Neurologist  [facility]  Tel:- [phone]    This note was dictated using voice recognition software.  Any grammatical or context distortions are unintentional and inherent to the software.    The longitudinal plan of care for the diagnosis(es)/condition(s) as documented were addressed during this visit. Due to the added complexity in care, I will continue to support Norma in the subsequent management and with ongoing continuity of care.

## 2025-08-18 ENCOUNTER — TELEPHONE (OUTPATIENT)
Dept: NEUROLOGY | Facility: CLINIC | Age: 80
End: 2025-08-18
Payer: COMMERCIAL